# Patient Record
Sex: FEMALE | Race: WHITE | NOT HISPANIC OR LATINO | Employment: OTHER | URBAN - METROPOLITAN AREA
[De-identification: names, ages, dates, MRNs, and addresses within clinical notes are randomized per-mention and may not be internally consistent; named-entity substitution may affect disease eponyms.]

---

## 2017-05-03 ENCOUNTER — HOSPITAL ENCOUNTER (OUTPATIENT)
Dept: MAMMOGRAPHY | Facility: CLINIC | Age: 47
Discharge: HOME | End: 2017-05-03
Attending: FAMILY MEDICINE

## 2017-05-03 DIAGNOSIS — Z12.31 VISIT FOR SCREENING MAMMOGRAM: ICD-10-CM

## 2017-05-26 ENCOUNTER — OFFICE VISIT - HEALTHEAST (OUTPATIENT)
Dept: FAMILY MEDICINE | Facility: CLINIC | Age: 47
End: 2017-05-26

## 2017-05-26 DIAGNOSIS — G89.29 CHRONIC PELVIC PAIN IN FEMALE: ICD-10-CM

## 2017-05-26 DIAGNOSIS — Z12.4 SCREENING FOR CERVICAL CANCER: ICD-10-CM

## 2017-05-26 DIAGNOSIS — Z00.00 ROUTINE GENERAL MEDICAL EXAMINATION AT A HEALTH CARE FACILITY: ICD-10-CM

## 2017-05-26 DIAGNOSIS — R10.2 CHRONIC PELVIC PAIN IN FEMALE: ICD-10-CM

## 2017-05-26 ASSESSMENT — MIFFLIN-ST. JEOR: SCORE: 1058.15

## 2017-06-01 LAB
HPV INTERPRETATION - HISTORICAL: NORMAL
HPV INTERPRETER - HISTORICAL: NORMAL

## 2017-06-06 LAB
BKR LAB AP ABNORMAL BLEEDING: NO
BKR LAB AP BIRTH CONTROL/HORMONES: ABNORMAL
BKR LAB AP CERVICAL APPEARANCE: NORMAL
BKR LAB AP GYN ADEQUACY: ABNORMAL
BKR LAB AP GYN INTERPRETATION: ABNORMAL
BKR LAB AP HPV REFLEX: ABNORMAL
BKR LAB AP LMP: ABNORMAL
BKR LAB AP PATIENT STATUS: ABNORMAL
BKR LAB AP PREVIOUS ABNORMAL: ABNORMAL
BKR LAB AP PREVIOUS NORMAL: ABNORMAL
HIGH RISK?: YES
PATH REPORT.COMMENTS IMP SPEC: ABNORMAL
RESULT FLAG (HE HISTORICAL CONVERSION): ABNORMAL

## 2017-06-08 ENCOUNTER — COMMUNICATION - HEALTHEAST (OUTPATIENT)
Dept: FAMILY MEDICINE | Facility: CLINIC | Age: 47
End: 2017-06-08

## 2017-06-08 DIAGNOSIS — R87.619 ABNORMAL PAP SMEAR OF CERVIX: ICD-10-CM

## 2017-08-10 ENCOUNTER — AMBULATORY - HEALTHEAST (OUTPATIENT)
Dept: OBGYN | Facility: CLINIC | Age: 47
End: 2017-08-10

## 2017-08-10 DIAGNOSIS — R87.612 PAP SMEAR ABNORMALITY OF CERVIX WITH LGSIL: ICD-10-CM

## 2017-08-10 DIAGNOSIS — Z01.812 PRE-PROCEDURAL LABORATORY EXAMINATION: ICD-10-CM

## 2017-08-10 ASSESSMENT — MIFFLIN-ST. JEOR: SCORE: 1057.67

## 2017-10-22 ENCOUNTER — HEALTH MAINTENANCE LETTER (OUTPATIENT)
Age: 47
End: 2017-10-22

## 2019-02-26 ENCOUNTER — RECORDS - HEALTHEAST (OUTPATIENT)
Dept: ADMINISTRATIVE | Facility: OTHER | Age: 49
End: 2019-02-26

## 2019-04-01 ENCOUNTER — AMBULATORY - HEALTHEAST (OUTPATIENT)
Dept: UROLOGY | Facility: CLINIC | Age: 49
End: 2019-04-01

## 2019-04-01 DIAGNOSIS — N20.0 CALCULUS OF KIDNEY: ICD-10-CM

## 2019-04-15 ENCOUNTER — HOSPITAL ENCOUNTER (OUTPATIENT)
Dept: MAMMOGRAPHY | Facility: CLINIC | Age: 49
Discharge: HOME | End: 2019-04-15
Attending: FAMILY MEDICINE

## 2019-04-15 ENCOUNTER — HOSPITAL ENCOUNTER (OUTPATIENT)
Dept: CT IMAGING | Facility: CLINIC | Age: 49
Discharge: HOME | End: 2019-04-15
Attending: PHYSICIAN ASSISTANT

## 2019-04-15 ENCOUNTER — OFFICE VISIT - HEALTHEAST (OUTPATIENT)
Dept: UROLOGY | Facility: CLINIC | Age: 49
End: 2019-04-15

## 2019-04-15 DIAGNOSIS — Z12.31 VISIT FOR SCREENING MAMMOGRAM: ICD-10-CM

## 2019-04-15 DIAGNOSIS — Z87.442 HISTORY OF KIDNEY STONES: ICD-10-CM

## 2019-04-15 DIAGNOSIS — N20.0 CALCULUS OF KIDNEY: ICD-10-CM

## 2019-04-15 LAB
ALBUMIN UR-MCNC: NEGATIVE MG/DL
APPEARANCE UR: ABNORMAL
BILIRUB UR QL STRIP: NEGATIVE
COLOR UR AUTO: YELLOW
GLUCOSE UR STRIP-MCNC: NEGATIVE MG/DL
HGB UR QL STRIP: ABNORMAL
KETONES UR STRIP-MCNC: NEGATIVE MG/DL
LEUKOCYTE ESTERASE UR QL STRIP: NEGATIVE
NITRATE UR QL: NEGATIVE
PH UR STRIP: 6 [PH] (ref 5–8)
SP GR UR STRIP: <=1.005 (ref 1–1.03)
UROBILINOGEN UR STRIP-ACNC: ABNORMAL

## 2019-04-15 RX ORDER — IBUPROFEN 200 MG
1 CAPSULE ORAL DAILY
Status: SHIPPED | COMMUNITY
Start: 2019-04-15

## 2019-04-29 ENCOUNTER — OFFICE VISIT - HEALTHEAST (OUTPATIENT)
Dept: FAMILY MEDICINE | Facility: CLINIC | Age: 49
End: 2019-04-29

## 2019-04-29 DIAGNOSIS — Z00.00 ROUTINE GENERAL MEDICAL EXAMINATION AT A HEALTH CARE FACILITY: ICD-10-CM

## 2019-04-29 DIAGNOSIS — Z12.4 SCREENING FOR CERVICAL CANCER: ICD-10-CM

## 2019-04-29 DIAGNOSIS — Z12.11 SPECIAL SCREENING FOR MALIGNANT NEOPLASMS, COLON: ICD-10-CM

## 2019-04-29 ASSESSMENT — MIFFLIN-ST. JEOR: SCORE: 1075.81

## 2019-04-30 ENCOUNTER — COMMUNICATION - HEALTHEAST (OUTPATIENT)
Dept: FAMILY MEDICINE | Facility: CLINIC | Age: 49
End: 2019-04-30

## 2019-04-30 LAB
HPV SOURCE: NORMAL
HUMAN PAPILLOMA VIRUS 16 DNA: NEGATIVE
HUMAN PAPILLOMA VIRUS 18 DNA: NEGATIVE
HUMAN PAPILLOMA VIRUS FINAL DIAGNOSIS: NORMAL
HUMAN PAPILLOMA VIRUS OTHER HR: NEGATIVE
SPECIMEN DESCRIPTION: NORMAL

## 2019-05-06 LAB
BKR LAB AP ABNORMAL BLEEDING: NO
BKR LAB AP BIRTH CONTROL/HORMONES: ABNORMAL
BKR LAB AP CERVICAL APPEARANCE: NORMAL
BKR LAB AP GYN ADEQUACY: ABNORMAL
BKR LAB AP GYN INTERPRETATION: ABNORMAL
BKR LAB AP HPV REFLEX: ABNORMAL
BKR LAB AP LMP: ABNORMAL
BKR LAB AP PATIENT STATUS: ABNORMAL
BKR LAB AP PREVIOUS ABNORMAL: ABNORMAL
BKR LAB AP PREVIOUS NORMAL: ABNORMAL
HIGH RISK?: NO
PATH REPORT.COMMENTS IMP SPEC: ABNORMAL
RESULT FLAG (HE HISTORICAL CONVERSION): ABNORMAL

## 2019-05-07 ENCOUNTER — COMMUNICATION - HEALTHEAST (OUTPATIENT)
Dept: FAMILY MEDICINE | Facility: CLINIC | Age: 49
End: 2019-05-07

## 2019-06-02 ENCOUNTER — RECORDS - HEALTHEAST (OUTPATIENT)
Dept: ADMINISTRATIVE | Facility: OTHER | Age: 49
End: 2019-06-02

## 2019-06-02 LAB — COLOGUARD-ABSTRACT: NEGATIVE

## 2019-06-17 ENCOUNTER — COMMUNICATION - HEALTHEAST (OUTPATIENT)
Dept: FAMILY MEDICINE | Facility: CLINIC | Age: 49
End: 2019-06-17

## 2019-06-21 ENCOUNTER — RECORDS - HEALTHEAST (OUTPATIENT)
Dept: HEALTH INFORMATION MANAGEMENT | Facility: CLINIC | Age: 49
End: 2019-06-21

## 2019-06-21 ENCOUNTER — COMMUNICATION - HEALTHEAST (OUTPATIENT)
Dept: FAMILY MEDICINE | Facility: CLINIC | Age: 49
End: 2019-06-21

## 2019-08-30 ENCOUNTER — AMBULATORY - HEALTHEAST (OUTPATIENT)
Dept: UROLOGY | Facility: CLINIC | Age: 49
End: 2019-08-30

## 2019-08-30 ENCOUNTER — COMMUNICATION - HEALTHEAST (OUTPATIENT)
Dept: UROLOGY | Facility: CLINIC | Age: 49
End: 2019-08-30

## 2019-08-30 ENCOUNTER — COMMUNICATION - HEALTHEAST (OUTPATIENT)
Dept: SCHEDULING | Facility: CLINIC | Age: 49
End: 2019-08-30

## 2019-08-30 DIAGNOSIS — N23 RENAL COLIC: ICD-10-CM

## 2019-08-30 DIAGNOSIS — N20.1 CALCULUS OF URETER: ICD-10-CM

## 2019-08-30 RX ORDER — ONDANSETRON 4 MG/1
4 TABLET, ORALLY DISINTEGRATING ORAL EVERY 8 HOURS PRN
Qty: 20 TABLET | Refills: 0 | Status: SHIPPED | OUTPATIENT
Start: 2019-08-30 | End: 2021-07-19

## 2019-08-30 RX ORDER — OXYCODONE HYDROCHLORIDE 5 MG/1
5-10 TABLET ORAL EVERY 4 HOURS PRN
Qty: 12 TABLET | Refills: 0 | Status: SHIPPED | OUTPATIENT
Start: 2019-08-30 | End: 2021-07-19

## 2020-02-24 ENCOUNTER — HEALTH MAINTENANCE LETTER (OUTPATIENT)
Age: 50
End: 2020-02-24

## 2020-03-25 ENCOUNTER — VIRTUAL VISIT (OUTPATIENT)
Dept: FAMILY MEDICINE | Facility: OTHER | Age: 50
End: 2020-03-25

## 2020-03-25 NOTE — PROGRESS NOTES
"Date: 2020 11:59:21  Clinician: GIO Sheldon  Clinician NPI: [id number]  Patient: Iman Alves  Patient : 1970  Patient Address: [address] [place] [address]  Patient Phone: [phone]  Visit Protocol: URI  Patient Summary:  Iman is a 49 year old ( : 1970 ) female who initiated a Visit for COVID-19 (Coronavirus) evaluation and screening. When asked the question \"Please sign me up to receive news, health information and promotions from [employer].\", Iman responded \"No\".    Iman states her symptoms started gradually 3-6 days ago. After her symptoms started, they improved and then got worse again.   Her symptoms consist of malaise, chills, a headache, and myalgia. Iman also feels feverish.   Symptom details     Temperature: Her current temperature is 97.2 degrees Fahrenheit.     Headache: She states the headache is mild (1-3 on a 10 point pain scale).      Iman denies having ear pain, rhinitis, teeth pain, facial pain or pressure, wheezing, sore throat, cough, and nasal congestion. She also denies having a sinus infection within the past year, taking antibiotic medication for the symptoms, and having recent facial or sinus surgery in the past 60 days. She is not experiencing dyspnea.   Precipitating events  She has not recently been exposed to someone with influenza. Iman has not been in close contact with any high risk individuals.   Pertinent COVID-19 (Coronavirus) information  Iman has not traveled internationally or to the areas where COVID-19 (Coronavirus) is widespread, including cruise ship travel in the last 14 days before the start of her symptoms.   Iman has not had a close contact with a laboratory-confirmed COVID-19 patient within 14 days of symptom onset. She has had a close contact with a suspected COVID-19 patient within 14 days of symptom onset. 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 The symptomatic person handled all of our groceries, and we chatted in close proximity for approximately 10+ minutes.   Iman is not a healthcare worker or a  and does not work in a healthcare facility. She is not a family member of a healthcare worker and does not live with someone who is a healthcare worker.   Pertinent medical history  Iman typically gets a yeast infection when she takes antibiotics. She has used fluconazole (Diflucan) to treat previous yeast infections. 1 dose of fluconazole (Diflucan) has typically been sufficient for symptoms to resolve in the past.   Iman does not need a return to work/school note.   Weight: 100 lbs   Iman does not smoke or use smokeless tobacco.   Additional information as reported by the patient (free text): Symptoms began with scratchy/sore throat &amp; tiredness. Then headache, achiness, extreme fatigue. Then cough for 1-2 days, with an unusual feeling of needing to breathe more deeply than usual with each breath. Most symptoms have subsided, except for worsening fatigue, weakness, achiness, &amp; chills. Last night, my temperature was 96.5 F (very low);  today, it's 97.2F. The unusual, persistently low body temperature is the reason for my concern.   Weight: 100 lbs    MEDICATIONS: No current medications, ALLERGIES: NKDA  Clinician Response:  Dear Iman,   Based on the information you have provided, you do have symptoms that are consistent with Coronavirus (COVID-19).  The coronavirus causes mild to severe respiratory illness with the most common symptoms including fever, cough and difficulty breathing. Unfortunately, many viruses cause similar symptoms and it can be difficult to distinguish between viruses, especially in mild cases, so we are presuming that anyone with cough or  fever has coronavirus at this time.  Coronavirus/COVID-19 has reached the point of community spread in [place], meaning that we are finding the virus in people with no known exposure risk for stacie the virus. Given the increasing commonness of coronavirus in the community we are no longer testing patients who are not critically ill.  If you are a health care worker, you should refer to your employee health office for instructions about testing and returning to work.  For everyone else who has cough or fever, you should assume you are infected with coronavirus. Since you will not be tested but have symptoms that may be consistent with coronavirus, the CDC recommends you stay in self-isolation until these three things have happened:    You have had no fever for at least 72 hours (that is three full days of no fever without the use of medicine that reduces fevers)    AND   Other symptoms have improved (for example, when your cough or shortness of breath have improved)   AND   At least 7 days have passed since your symptoms first appeared.   How to Isolate:   Isolate yourself at home.  Do Not allow any visitors  Do Not go to work or school  Do Not go to [employer],  centers, shopping, or other public places.  Do Not shake hands.  Avoid close contact with others (hugging, kissing).   Protect Others:   Cover Your Mouth and Nose with a mask, disposable tissue or wash cloth to avoid spreading germs to others.  Wash your hands and face frequently with soap and water.   We know it can be scary to hear that you might have COVID-19. Our team can help track your symptoms and make sure you are doing ok over the next two weeks using a program called [employer] to keep in touch. When you receive an email from [employer], please consider enrolling in our monitoring program. There is no cost to you for monitoring. Here is a URL where you can learn more: [email or web address]  Managing Symptoms:   At this  time, we primarily recommend Tylenol (Acetaminophen) for fever or pain. If you have liver or kidney problems, contact your primary care provider for instructions on use of tylenol. Adults can take 650 mg (two 325 mg pills) by mouth every 4-6 hours as needed OR 1,000 mg (two 500 mg pills) every 8 hours as needed. MAXIMUM DAILY DOSE: 3,000mg. For children, refer to dosing on bottle based on age or weight.   If you develop significant shortness of breath that prevents you from doing normal activities, please call 911 or proceed to the nearest emergency room and alert them immediately that you have been in self-isolation for possible coronavirus.  For more information about COVID19 and options for caring for yourself at home, please visit the CDC website at https://www.cdc.gov/coronavirus/2019-ncov/about/steps-when-sick.htmlFor more options for care at [facility], please visit our website at [email or web address]    Diagnosis: Myalgia  Diagnosis ICD: M79.1  Additional Clinician Notes: Iman, your temperature does not concern me too much. Normal body temperature can range between 97-99 degrees. The 96.5 degree temperature is rather low, but as long as it is not persistently 96 and comes back up into the 97's I don't think there is significant cause for concern. I would follow the self-isolation guidelines above given your symptoms and exposure history.  Prescription: acetaminophen (Tylenol Extra Strength) 500 mg oral tablet 60 tablet, 0 days supply. Take 2 tablets by mouth every 8 hours as needed for pain or fever. Refills: 0, Refill as needed: no, Allow substitutions: yes

## 2020-11-21 ENCOUNTER — NURSE TRIAGE (OUTPATIENT)
Dept: NURSING | Facility: CLINIC | Age: 50
End: 2020-11-21

## 2020-11-21 ENCOUNTER — VIRTUAL VISIT (OUTPATIENT)
Dept: FAMILY MEDICINE | Facility: OTHER | Age: 50
End: 2020-11-21

## 2020-11-21 NOTE — TELEPHONE ENCOUNTER
One of eyes is somewhat red and bloodshot, feels like something is in it, extra thick tears/thin tears, clear discharge, slightly swollen    Does not feel like pink eye  No itching   No changes in vision    Feels mildly uncomfortable.     Patient is worried that this is some sort of off the wall COVID symptom and does not want to be publicly irresponsible.     Advised [email or web address] to discuss need for COVID19 testing. Patient is agreeable.     COVID 19 Nurse Triage Plan/Patient Instructions    Please be aware that novel coronavirus (COVID-19) may be circulating in the community. If you develop symptoms such as fever, cough, or SOB or if you have concerns about the presence of another infection including coronavirus (COVID-19), please contact your health care provider or visit [email or web address].     Disposition/Instructions    Virtual Visit with provider recommended. Reference Visit Selection Guide.    Thank you for taking steps to prevent the spread of this virus.  o Limit your contact with others.  o Wear a simple mask to cover your cough.  o Wash your hands well and often.    Resources    [facility]: About COVID-19: [email or web address]    CDC: What to Do If You're Sick: www.cdc.gov/coronavirus/2019-ncov/about/steps-when-sick.html    CDC: Ending Home Isolation: www.cdc.gov/coronavirus/2019-ncov/hcp/disposition-in-home-patients.html     CDC: Caring for Someone: www.cdc.gov/coronavirus/2019-ncov/if-you-are-sick/care-for-someone.html     [facility]: Interim Guidance for Hospital Discharge to Home: www.health.[facility].[place]/diseases/coronavirus/hcp/hospdischarge.pdf    [facility] clinical trials (COVID-19 research studies): clinicalaffairs.[facility].edu/umn-clinical-trials     Below are the COVID-19 hotlines at the [place] Department of Health ([facility]). Interpreters are available.   o For health questions: Call [phone] or 1-558.147.1024 (7 a.m. to 7 p.m.)  o For questions about schools and childcare:  Call [phone] or [phone] (7 a.m. to 7 p.m.)     Additional Information    Negative: Eye exposure to chemical or fumes    Negative: Redness of white of eye (sclera), but no pus or only a small amount of brief pus    Negative: SEVERE eye pain (e.g., excruciating)    Negative: [1] Eyelids are very swollen (shut or almost) AND [2] fever    Negative: [1] Eyelid (outer) is very red (or tender to touch) AND [2] fever    Negative: Patient sounds very sick or weak to the triager    Negative: MODERATE eye pain (e.g., interferes with normal activities)    Negative: Fever > 104 F (40 C)    Negative: Cloudy spot or sore seen on the cornea (clear part of the eye)    Negative: Blurred vision    Negative: Eye is very swollen (shut or almost)    Negative: [1] MILD eye pain or discomfort AND [2] wears contacts    Negative: Eyelid is red and painful (or tender to touch)    Negative: Discharge from penis    Negative: New or abnormal vaginal discharge    Negative: Fever present > 3 days (72 hours)    Negative: [1] Lots of yellow or green NASAL discharge AND [2] present now AND [3] fever    Negative: Weak immune system (e.g., HIV positive, cancer chemo, splenectomy, organ transplant, chronic steroids)    Negative: [1] Eye with yellow/green discharge or eyelashes stick together AND [2] NO PCP standing order to call in antibiotic eye drops    Negative: [1] Using antibiotic eyedrops AND [2] eyes have become very itchy (especially after eyedrops are put in)    Negative: [1] Taking oral antibiotic > 48 hours (2 days) AND [2] pus in eye persists    Negative: [1] Using antibiotic eyedrops > 72 hours (3 days) AND [2] pus in eye persists    Negative: Bleeding on white of the eye    [1] Very small amount of discharge AND [2] only in corner of eye    Protocols used: EYE - PUS OR [id number]    Clementina Sim RN on 11/21/2020 at 1:21 PM

## 2020-11-21 NOTE — PROGRESS NOTES
"Date: 2020 14:18:07  Clinician: Cherise Beltran  Clinician NPI: [id number]  Patient: Iman Alves  Patient : 1970  Patient Address: [address] [place] [address]  Patient Phone: [phone]  Visit Protocol: URI  Patient Summary:  Iman is a 50 year old ( : 1970 ) female who initiated a [facility] Visit for COVID-19 (Coronavirus) evaluation and screening. When asked the question \"Please sign me up to receive news, health information and promotions from [facility].\", Iman responded \"No\".    Iman states her symptoms started gradually 3-4 days ago. After her symptoms started, they improved and then got worse again.   Her symptoms consist of rhinitis and malaise.   Symptom details   Nasal secretions: The color of her mucus is clear.   Iman denies having vomiting, facial pain or pressure, myalgias, chills, sore throat, teeth pain, ageusia, diarrhea, ear pain, headache, wheezing, fever, cough, nasal congestion, nausea, and anosmia. She also denies taking antibiotic medication in the past month and having recent facial or sinus surgery in the past 60 days. She is not experiencing dyspnea.    Pertinent COVID-19 (Coronavirus) information  Iman does not work or volunteer as healthcare worker or a . In the past 14 days, Iman has not worked or volunteered at a healthcare facility or group living setting.   In the past 14 days, she also has not lived in a congregate living setting.   Iman has not had a close contact with a laboratory-confirmed COVID-19 patient within 14 days of symptom onset.    Since 2019, Iman has not been tested for COVID-19 and has not had upper respiratory infection or influenza-like illness.   Pertinent medical history  Iman typically gets a yeast infection when she takes antibiotics. She is not sure if she has used fluconazole (Diflucan) to treat previous yeast infections.   Iman does not need a return to work/school note.   Weight: 102 lbs   Iman " "does not smoke or use smokeless tobacco.   Additional information as reported by the patient (free text): The primary reason for my consultation is due to irritation, mild redness, excessive tears, and (especially) accumulation of thick \"tears\" or mucus inside my lower eyelid on my right eye. No itching, no crusty or yellow discharge, and no itchiness as would typically occur with common \"pinkeye.\" I am an infectious disease  and am aware that viral conjunctivitis has been identified as a somewhat rare symptom of COVID-19 in certain otherwise asymptomatic or only mildly ill people.   Weight: 102 lbs    MEDICATIONS: No current medications, ALLERGIES: NKDA  Clinician Response:  Dear Iman,   Your symptoms show that you may have coronavirus (COVID-19). This illness can cause fever, cough and trouble breathing. Many people get a mild case and get better on their own. Some people can get very sick.  What should I do?  We would like to test you for this virus.   1. Please call [phone] to schedule your visit. Explain that you were referred by [facility] to have a COVID-19 test. Be ready to share your [facility] visit [facility].  * If you need to schedule in [facility] [place] please call [phone] or for [employer] employees please call [phone].  * If you need to schedule in the [place] area please call [phone]. [employer] employees call [phone].  The following will serve as your written order for this COVID Test, ordered by me, for the indication of suspected COVID [Z20.828]: The test will be ordered in [employer], our electronic health record, after you are scheduled. It will show as ordered and authorized by Romulo Guillory MD.  Order: COVID-19 (Coronavirus) PCR for SYMPTOMATIC testing from [facility].   2. When it's time for your COVID test:  Stay at least 6 feet away from others. (If someone will drive you to your test, stay in the backseat, as far away from the  as you can.)   Cover your mouth and nose with " "a mask, tissue or washcloth.  Go straight to the testing site. Don't make any stops on the way there or back.      3.Starting now: Stay home and away from others (self-isolate) until:   You've had no fever---and no medicine that reduces fever---for one full day (24 hours). And...   Your other symptoms have gotten better. For example, your cough or breathing has improved. And...   At least 10 days have passed since your symptoms started.       During this time, don't leave the house except for testing or medical care.   Stay in your own room, even for meals. Use your own bathroom if you can.   Stay away from others in your home. No hugging, kissing or shaking hands. No visitors.  Don't go to work, school or anywhere else.    Clean \"high touch\" surfaces often (doorknobs, counters, handles, etc.). Use a household cleaning spray or wipes. You'll find a full list of  on the EPA website: www.epa.gov/pesticide-registration/list-n-disinfectants-use-against-sars-cov-2.   Cover your mouth and nose with a mask, tissue or washcloth to avoid spreading germs.  Wash your hands and face often. Use soap and water.  Caregivers in these groups are at risk for severe illness due to COVID-19:  o People 65 years and older  o People who live in a nursing home or long-term care facility  o People with chronic disease (lung, heart, cancer, diabetes, kidney, liver, immunologic)  o People who have a weakened immune system, including those who:   Are in cancer treatment  Take medicine that weakens the immune system, such as corticosteroids  Had a bone marrow or organ transplant  Have an immune deficiency  Have poorly controlled HIV or AIDS  Are obese (body mass index of 40 or higher)  Smoke regularly   o Caregivers should wear gloves while washing dishes, handling laundry and cleaning bedrooms and bathrooms.  o Use caution when washing and drying laundry: Don't shake dirty laundry, and use the warmest water setting that you can.  o For " more tips, go to www.cdc.gov/coronavirus/2019-ncov/downloads/10Things.pdf.    4.Sign up for [employer]. We know it's scary to hear that you might have COVID-19. We want to track your symptoms to make sure you're okay over the next 2 weeks. Please look for an email from [employer]---this is a free, online program that we'll use to keep in touch. To sign up, follow the link in the email. Learn more at http://www.[employer])/251070.pdf  How can I take care of myself?   Get lots of rest. Drink extra fluids (unless a doctor has told you not to).   Take Tylenol (acetaminophen) for fever or pain. If you have liver or kidney problems, ask your family doctor if it's okay to take Tylenol.   Adults can take either:    650 mg (two 325 mg pills) every 4 to 6 hours, or...   1,000 mg (two 500 mg pills) every 8 hours as needed.    Note: Don't take more than 3,000 mg in one day. Acetaminophen is found in many medicines (both prescribed and over-the-counter medicines). Read all labels to be sure you don't take too much.   For children, check the Tylenol bottle for the right dose. The dose is based on the child's age or weight.    If you have other health problems (like cancer, heart failure, an organ transplant or severe kidney disease): Call your specialty clinic if you don't feel better in the next 2 days.       Know when to call 911. Emergency warning signs include:    Trouble breathing or shortness of breath Pain or pressure in the chest that doesn't go away Feeling confused like you haven't felt before, or not being able to wake up Bluish-colored lips or face.  Where can I get more information?    [facility] -- About COVID-19: [email or web address]   CDC -- What to Do If You're Sick: www.cdc.gov/coronavirus/2019-ncov/about/steps-when-sick.html   CDC -- Ending Home Isolation: www.cdc.gov/coronavirus/2019-ncov/hcp/disposition-in-home-patients.html   CDC -- Caring for Someone:  www.cdc.gov/coronavirus/2019-ncov/if-you-are-sick/care-for-someone.html   [facility] -- Interim Guidance for Hospital Discharge to Home: www.health.[facility].[place]/diseases/coronavirus/hcp/hospdischarge.pdf   [facility] clinical trials (COVID-19 research studies): clinicalaffairs.[facility]-clinical-trials    Below are the COVID-19 hotlines at the [place] Department of Health ([facility]). Interpreters are available.    For health questions: Call 743-605-2587 or 1-697.118.7443 (7 a.m. to 7 p.m.) For questions about schools and childcare: Call 590-958-2258 or 1-677.143.6724 (7 a.m. to 7 p.m.)    Diagnosis: Contact with and (suspected) exposure to other viral communicable diseases  Diagnosis ICD: Z20.828

## 2020-12-13 ENCOUNTER — HEALTH MAINTENANCE LETTER (OUTPATIENT)
Age: 50
End: 2020-12-13

## 2021-01-27 ENCOUNTER — COMMUNICATION - HEALTHEAST (OUTPATIENT)
Dept: SCHEDULING | Facility: CLINIC | Age: 51
End: 2021-01-27

## 2021-01-28 ENCOUNTER — OFFICE VISIT - HEALTHEAST (OUTPATIENT)
Dept: FAMILY MEDICINE | Facility: CLINIC | Age: 51
End: 2021-01-28

## 2021-01-28 DIAGNOSIS — G56.01 CARPAL TUNNEL SYNDROME OF RIGHT WRIST: ICD-10-CM

## 2021-01-28 DIAGNOSIS — S52.501K CLOSED FRACTURE OF DISTAL END OF RIGHT RADIUS WITH NONUNION, UNSPECIFIED FRACTURE MORPHOLOGY, SUBSEQUENT ENCOUNTER: ICD-10-CM

## 2021-01-28 DIAGNOSIS — Z01.818 PREOPERATIVE EXAMINATION: ICD-10-CM

## 2021-01-28 LAB — HCG UR QL: NEGATIVE

## 2021-01-28 ASSESSMENT — MIFFLIN-ST. JEOR: SCORE: 1087.94

## 2021-02-11 ENCOUNTER — TRANSFERRED RECORDS (OUTPATIENT)
Dept: HEALTH INFORMATION MANAGEMENT | Facility: CLINIC | Age: 51
End: 2021-02-11

## 2021-02-15 ENCOUNTER — THERAPY VISIT (OUTPATIENT)
Dept: OCCUPATIONAL THERAPY | Facility: CLINIC | Age: 51
End: 2021-02-15
Payer: COMMERCIAL

## 2021-02-15 DIAGNOSIS — S52.531D CLOSED COLLES' FRACTURE OF RIGHT RADIUS WITH ROUTINE HEALING: ICD-10-CM

## 2021-02-15 DIAGNOSIS — M25.631 WRIST STIFFNESS, RIGHT: ICD-10-CM

## 2021-02-15 DIAGNOSIS — M25.531 RIGHT WRIST PAIN: Primary | ICD-10-CM

## 2021-02-15 DIAGNOSIS — Z48.89 OTHER SPECIFIED AFTERCARE FOLLOWING SURGERY: ICD-10-CM

## 2021-02-15 PROCEDURE — 97165 OT EVAL LOW COMPLEX 30 MIN: CPT | Mod: GO | Performed by: OCCUPATIONAL THERAPIST

## 2021-02-15 PROCEDURE — 97110 THERAPEUTIC EXERCISES: CPT | Mod: GO | Performed by: OCCUPATIONAL THERAPIST

## 2021-02-15 PROCEDURE — 97535 SELF CARE MNGMENT TRAINING: CPT | Mod: GO | Performed by: OCCUPATIONAL THERAPIST

## 2021-02-15 NOTE — PROGRESS NOTES
Hand Therapy Initial Evaluation    Current Date:  2/15/2021    Diagnosis:  Right Distal radis fracture  DOI:  1/22/21  DOS:  2/2/21  Procedure:  ORIF  Post:  2w 0d  Referring MD: Bear Alejo MD  Next MD visit: 2 weeks     Subjective:  Iman Alves is a 50 year old right hand dominant female.  Subjective:    Patient Health History  Iman Alves being seen for rehabilitation after ORIF/carpal tunnel surgery for broken distal radius and (non-surgical) broken distal ulna.     Problem began: 1/22/2021.   Problem occurred: fall in home   Pain is reported as 4/10 on pain scale.  General health as reported by patient is good.  Pertinent medical history includes: history of fractures, numbness/tingling, pain at night/rest, changes in skin color and weakness.     Medical allergies: none.   Surgeries include:  Orthopedic surgery and other. Other surgery history details: umbilical cyst repair (1972); abdominal muscle repair (2008).    Current medications:  None.    Current occupation is / (self-employed).   Primary job tasks include:  Computer work, driving, prolonged sitting and repetitive tasks.                      Occupational Profile Information:  Problem began: was stepping over the baby/puppy gate and tripped and fell, notes she had a minor concussion after the fall and this was assessed at the ED.    Pior functional level:  no limitations    Barriers include:lives with son, age 13, healing fx left leg  Mobility: No difficulty  Transportation: drives  Leisure activities/hobbies: has a new puppy, walk the puppy, works on the house, was painting and remolding, likes to read  Other: difficulty typing only doing it one handed    Functional Outcome Measure:   Please refer to flowsheet    Objective:    Pain Level Report:  Pain Level (Scale 0-10):   2/15/2021   At Rest 3-4   With Use 6-7     Pain Description:  Date 2/15/2021    right    Location  elbow, forearm, wrist and hand  Shoulder and elbow  cubital tunnel area since the surgery has been noted   Pain Quality Aching, Dull, Numb, Sharp, Tender, Tingling and zinging   Frequency intermittent, constant or daily     Pain is worst daytime   Exacerbated by Use and typing   Relieved by heat, rest and touching and moving into a better position, OTC as needed   Progression Not better     Strength:  [x]   Contraindicated  Edema:  moderate of the  hand, thumb, index finger, long finger, ring finger and small finger  Wound/Scar: tender, clean without drainage and closed with steristrips  Palpation:  []     Normal        []   Tender       [x]  Mild         []   Moderate []   Severe   Location: volar forearm       Sensation: Decreased Median Nerve distribution per pt report and since the surgery, pt reported  ROM:  Wrist 2/15/2021   AROM(PROM) Right   Extension 20   Flexion 15   RD    UD    Supination 65   Pronation 20     ROM:    Thumb 2/15/2021   AROM(PROM) Right   MP 0/30   IP -5/50   RAbd    PAbd 55   Retropulsion    Kapandji Opposition Scale (0-10/10) 5       ROM:   Fingers 2/15/2021 2/15/2021   AROM(PROM) Right Flex lag   Index MP -15 2.5   PIP -10    DIP     FERRARA     Long MP -20 2.5   PIP -15    DIP     FERRARA     Ring MP -15 2.5   PIP -15    DIP     FERRARA     Small MP -10 1.75   PIP -10    DIP     FERRARA         Assessment:  Patient presents with symptoms consistent with diagnosis of closed distal radius fracture, with surgical  intervention.     Patient's limitations or Problem List includes:  Pain, Decreased ROM/motion, Weakness, Decreased  and pinch strength of the right elbow, wrist, hand, thumb, index finger, long finger, ring finger and small finger which interferes with the patient's ability to perform Self Care Tasks (dressing, eating, bathing, hygiene/toileting), Work Tasks, Sleep Patterns, Recreational Activities, Household Chores and Driving  as compared to previous level of function.    Rehab Potential:  Excellent - Return to full activity, no  limitations  Patient will benefit from skilled Occupational Therapy to increase wrist and forearm ROM, flexibility,  strength and pinch strength and decrease pain to return to previous activity level and resume normal daily tasks and to reach their rehab potential.    Barriers to Learning:  No barrier    Communication Issues:  Patient appears to be able to clearly communicate and understand verbal and written communication and follow directions correctly.  Chart Review: Chart Review, Brief history including review of medical and/or therapy records relating to the presenting problem and Simple history review with patient    Identified Performance Deficits: bathing/showering, toileting, dressing, feeding, hygiene and grooming, care of others, care of pets, driving and community mobility, home establishment and management, meal preparation and cleanup, sleep, work, play and leisure activities    Assessment of Occupational Performance:  5 or more Performance Deficits    Treatment Explanation:  The following has been discussed with the patient:    RX ordered/plan of care  Anticipated outcomes  Possible risks and side effects    Plan:  Frequency:  2 X week, once daily  Duration:  for 2 weeks tapering to 1 X a week over 6 weeks      Treatment Plan:        Modalities:  Paraffin  Therapeutic Exercise:  AROM, AAROM, PROM, Tendon Gliding, Blocking, Reverse Blocking and Extensor Tracking  Neuromuscular re-education:  Nerve Gliding, Coordination/Dexterity, Sensory re-education, Desensitization and Kinesiotaping  Manual Techniques:  Scar mobilization, Myofascial release and Manual edema mobilization  Self Care:  Ergonomic Considerations  Orthotic Fabrication: Static forearm based orthosis    Discharge Plan:    Achieve all LTG.  Independent in home treatment program.  Reach maximal therapeutic benefit.    Home Program:   Edema management per guidelines 10 minutes on the hour every waking hour  AROM of unaffected joints 3 times  "per day  2/15/2021  Exercise Name: Thumb Active Range of Motion IP Joint Blocking, Sets: 1 - Reps: 10 - Sessions: 3, Notes: Hold and move the tip, in the splint and out of the splint  Exercise Name: Thumb Active Range of Motion MP Joint Blocking, Sets: 1-2 sets - Reps: 10 reps - Sessions: 3, Notes: hold the base of your thumb and move at the middle  joint of your thumb, not bending the tip joint    Exercise Name: Thumb Active Range of Motion Composite Flexion, Sets: 1  - Reps:  5 - 10reps - Sessions: 3x/day, Notes: Remove the splint, and gently move your thumb in a direction down toward the table, and then gently lift it up. Move only in a pain free range, keeping the thumb close to your palm  Exercise Name: Thumb Active Range of Motion Palmar Abduction, Sets: 1 set  - Reps: 5 reps - Sessions: 3x/day, Notes: Roll your thumb out, like making a  big C  Exercise Name: Thumb Active Range of Motion Opposition, Sets: 1-2 - Reps: 5 reps - Sessions: 3-5, Notes:  \"O\" position  to each finger as tolerated, do not push into pain.   Gentle motion within your pain tolerance..  Roll your thumb WAY out in a wide arc from first position to each finger, between each finger tips, lift thumb away with attention to lifting from the middle thumb joint.    Exercise Name: Thumb Active Range of Motion Circumduction, Sets: 1-2   - Reps: 5 - 10x each direction - Sessions: 3, Notes: Keeping the thumb in a Capital C. (Twiddle your thumbs) Make slow circles starting from the bottom or base thumb joint and make small circles, keeping the  metacarpal and proximal phalanx in line. Do not allow the thumb to have a \"broken line\" or deviate from the straight line.   Exercise Name: Finger Active Range of Motion DIP Joint Blocking, Sets: 1-2 - Reps: 5-10 repetitions each set - Sessions: 3-5 , Notes: For your end joint of the your finger   Exercise Name: Finger Active Range of Motion PIP Joint Blocking, Sets: 1-2 - Reps: 5-10 each - Sessions: 3-5, " Notes: MIDDLE JOINT MOTION involved fingers  You can do this over the edge of a table, or you can.   Hold all fingers with your other palm, and then lift and straighten the middle joint of all finger each one individually  Exercise Name: Finger Active Range of Motion FDS Flexor Tendon Gliding, Sets: 1 set  - Reps: 3-5 reps each finger - Sessions: 3, Notes: Moving each finger separately. Respect the pain, and do VERY SLOWLY. FOCUS the bending at the middle joints, and DO NOT PUSH into pain  do for all fingers  Exercise Name:  Intrinsic Finger Active Range of Motion Ab and Adduction, Sets: 1 - Reps: 5-10 - Sessions: 3-5, Notes: open all your fingers wide, then close them, focus on the fingers  Exercise Name: Finger Active Range of Motion Table Top Fist Series - Reps: 10 - Sessions: Every hour, Notes: You can hold a ball or small cylinder in your hand to help your finger to move toward a full fist  Exercise Name: Finger Active Range of Motion Tendon Glides Fist Series, Sets: 1 - Reps: 5-10 reps - Sessions: 3-5x/day, Notes: each position, 3 times, then move to the next position, 3 times, and then reach only to the point of pain for the 3rd position, NO PAIN. and you can do this going backwards: deep/long fist, regular fist, hook fist to fingers tall   wrist stays straight  hold 1-2 sec in each position  Exercise Name: Forearm Functional Range of Motion for Pronation/Supination, Sets: 1-2 - Reps: 15-20 - Sessions: 3, Notes: Go Knee to knee:  With your splint on. You  can then try to  roll your forearm on your leg or on a table with out the support of the other hand. Also, touch right knee palm down, then right shoulder, then Left knee palm up then L shoulder  Exercise Name: Forearm Active Range of Motion Combined Pronation and Supination, Sets: 1 - Reps: 10 - Sessions: 3, Notes: Always work toward palm up, even resting in your lap. Turn your palm up and down. You may rest the arm  on a table or in  your lap and roll the  arm palm up and down (emphasis on palm up).   Exercise Name: Wrist Active Range of Motion DTM/Dart Throwers Motion with Elbow on Table, Sets: 2 - Reps: 20 - Sessions: 1-2, Notes: Dart throwing motion; move the wrist back and forth, slow deliberate motion. Slow and gentle  Hold a paper ball or a very light foam ball: no gripping, just holding the ball. Move in mid range: about 30% of motion    Next Visit:  Progress ROM and follow MD Orders

## 2021-02-18 ENCOUNTER — THERAPY VISIT (OUTPATIENT)
Dept: OCCUPATIONAL THERAPY | Facility: CLINIC | Age: 51
End: 2021-02-18
Payer: COMMERCIAL

## 2021-02-18 DIAGNOSIS — M25.531 RIGHT WRIST PAIN: Primary | ICD-10-CM

## 2021-02-18 DIAGNOSIS — Z48.89 OTHER SPECIFIED AFTERCARE FOLLOWING SURGERY: ICD-10-CM

## 2021-02-18 DIAGNOSIS — S52.531D CLOSED COLLES' FRACTURE OF RIGHT RADIUS WITH ROUTINE HEALING: ICD-10-CM

## 2021-02-18 DIAGNOSIS — M25.631 WRIST STIFFNESS, RIGHT: ICD-10-CM

## 2021-02-18 PROCEDURE — 97110 THERAPEUTIC EXERCISES: CPT | Mod: 95 | Performed by: OCCUPATIONAL THERAPIST

## 2021-02-18 NOTE — PROGRESS NOTES
SOAP note objective information for 2/18/2021.  Please refer to the daily flowsheet for treatment today, total treatment time and time spent performing 1:1 timed codes.        Diagnosis:  Right Distal radis fracture  DOI:  1/22/21  DOS:  2/2/21  Procedure:  ORIF  Post:  2w 0d  Referring MD: Bear Alejo MD  Next MD visit: 2 weeks     Subjective:  Iman Alves is a 50 year old right hand dominant female.  Subjective:    Patient Health History  Iman Alves being seen for rehabilitation after ORIF/carpal tunnel surgery for broken distal radius and (non-surgical) broken distal ulna.     Problem began: 1/22/2021.   Problem occurred: fall in home   Pain is reported as 4/10 on pain scale.  General health as reported by patient is good.  Pertinent medical history includes: history of fractures, numbness/tingling, pain at night/rest, changes in skin color and weakness.     Medical allergies: none.   Surgeries include:  Orthopedic surgery and other. Other surgery history details: umbilical cyst repair (1972); abdominal muscle repair (2008).    Current medications:  None.    Current occupation is / (self-employed).   Primary job tasks include:  Computer work, driving, prolonged sitting and repetitive tasks.                  Occupational Profile Information:  Problem began: was stepping over the baby/puppy gate and tripped and fell, notes she had a minor concussion after the fall and this was assessed at the ED.    Pior functional level:  no limitations    Barriers include:lives with son, age 13, healing fx left leg  Mobility: No difficulty  Transportation: drives  Leisure activities/hobbies: has a new puppy, walk the puppy, works on the house, was painting and remolding, likes to read  Other: difficulty typing only doing it one handed    Functional Outcome Measure:   Please refer to flowsheet    Objective:    Pain Level Report:  Pain Level (Scale 0-10):   2/15/2021    At Rest 3-4    With Use 6-7       Pain Description:  Date 2/15/2021     right     Location  elbow, forearm, wrist and hand  Shoulder and elbow cubital tunnel area since the surgery has been noted    Pain Quality Aching, Dull, Numb, Sharp, Tender, Tingling and zinging    Frequency intermittent, constant or daily      Pain is worst daytime    Exacerbated by Use and typing    Relieved by heat, rest and touching and moving into a better position, OTC as needed    Progression Not better      Strength:  [x]   Contraindicated  Edema:  moderate of the  hand, thumb, index finger, long finger, ring finger and small finger  Wound/Scar: tender, clean without drainage and closed with steristrips  Palpation:  []     Normal        []   Tender       [x]  Mild         []   Moderate []   Severe   Location: volar forearm       Sensation: Decreased Median Nerve distribution per pt report and since the surgery, pt reported  ROM:  Wrist 2/15/2021 2/18/21   AROM(PROM) Right    Extension 20    Flexion 15    RD     UD     Supination 65    Pronation 20      ROM:    Thumb  2/15/2021   AROM(PROM)  Right   MP  0/30   IP  -5/50   RAbd     PAbd  55   Retropulsion     Kapandji Opposition Scale (0-10/10)  5       ROM:   Fingers 2/15/2021 2/15/2021    AROM(PROM) Right Flex lag    Index MP -15 2.5    PIP -10     DIP      FERRARA      Long MP -20 2.5    PIP -15     DIP      FERRARA      Ring MP -15 2.5    PIP -15     DIP      FERRARA      Small MP -10 1.75    PIP -10     DIP      FERRARA          Assessment:  Please refer to the daily flowsheet for treatment today, total treatment time and time spent performing 1:1 timed codes.        Plan:  Frequency:  2 X week, once daily  Duration:  for 2 weeks tapering to 1 X a week over 6 weeks      Treatment Plan:        Modalities:  Paraffin  Therapeutic Exercise:  AROM, AAROM, PROM, Tendon Gliding, Blocking, Reverse Blocking and Extensor Tracking  Neuromuscular re-education:  Nerve Gliding, Coordination/Dexterity, Sensory re-education, Desensitization and  "Kinesiotaping  Manual Techniques:  Scar mobilization, Myofascial release and Manual edema mobilization  Self Care:  Ergonomic Considerations  Orthotic Fabrication: Static forearm based orthosis    Discharge Plan:    Achieve all LTG.  Independent in home treatment program.  Reach maximal therapeutic benefit.    Home Program:   Edema management per guidelines 10 minutes on the hour every waking hour  AROM of unaffected joints 3 times per day  2/15/2021  Exercise Name: Thumb Active Range of Motion IP Joint Blocking, Sets: 1 - Reps: 10 - Sessions: 3, Notes: Hold and move the tip, in the splint and out of the splint  Exercise Name: Thumb Active Range of Motion MP Joint Blocking, Sets: 1-2 sets - Reps: 10 reps - Sessions: 3, Notes: hold the base of your thumb and move at the middle  joint of your thumb, not bending the tip joint    Exercise Name: Thumb Active Range of Motion Composite Flexion, Sets: 1  - Reps:  5 - 10reps - Sessions: 3x/day, Notes: Remove the splint, and gently move your thumb in a direction down toward the table, and then gently lift it up. Move only in a pain free range, keeping the thumb close to your palm  Exercise Name: Thumb Active Range of Motion Palmar Abduction, Sets: 1 set  - Reps: 5 reps - Sessions: 3x/day, Notes: Roll your thumb out, like making a  big C  Exercise Name: Thumb Active Range of Motion Opposition, Sets: 1-2 - Reps: 5 reps - Sessions: 3-5, Notes:  \"O\" position  to each finger as tolerated, do not push into pain.   Gentle motion within your pain tolerance..  Roll your thumb WAY out in a wide arc from first position to each finger, between each finger tips, lift thumb away with attention to lifting from the middle thumb joint.    Exercise Name: Thumb Active Range of Motion Circumduction, Sets: 1-2   - Reps: 5 - 10x each direction - Sessions: 3, Notes: Keeping the thumb in a Capital C. (Twiddle your thumbs) Make slow circles starting from the bottom or base thumb joint and make small " "circles, keeping the  metacarpal and proximal phalanx in line. Do not allow the thumb to have a \"broken line\" or deviate from the straight line.   Exercise Name: Finger Active Range of Motion DIP Joint Blocking, Sets: 1-2 - Reps: 5-10 repetitions each set - Sessions: 3-5 , Notes: For your end joint of the your finger   Exercise Name: Finger Active Range of Motion PIP Joint Blocking, Sets: 1-2 - Reps: 5-10 each - Sessions: 3-5, Notes: MIDDLE JOINT MOTION involved fingers  You can do this over the edge of a table, or you can.   Hold all fingers with your other palm, and then lift and straighten the middle joint of all finger each one individually  Exercise Name: Finger Active Range of Motion FDS Flexor Tendon Gliding, Sets: 1 set  - Reps: 3-5 reps each finger - Sessions: 3, Notes: Moving each finger separately. Respect the pain, and do VERY SLOWLY. FOCUS the bending at the middle joints, and DO NOT PUSH into pain  do for all fingers  Exercise Name:  Intrinsic Finger Active Range of Motion Ab and Adduction, Sets: 1 - Reps: 5-10 - Sessions: 3-5, Notes: open all your fingers wide, then close them, focus on the fingers  Exercise Name: Finger Active Range of Motion Table Top Fist Series - Reps: 10 - Sessions: Every hour, Notes: You can hold a ball or small cylinder in your hand to help your finger to move toward a full fist  Exercise Name: Finger Active Range of Motion Tendon Glides Fist Series, Sets: 1 - Reps: 5-10 reps - Sessions: 3-5x/day, Notes: each position, 3 times, then move to the next position, 3 times, and then reach only to the point of pain for the 3rd position, NO PAIN. and you can do this going backwards: deep/long fist, regular fist, hook fist to fingers tall   wrist stays straight  hold 1-2 sec in each position  Exercise Name: Forearm Functional Range of Motion for Pronation/Supination, Sets: 1-2 - Reps: 15-20 - Sessions: 3, Notes: Go Knee to knee:  With your splint on. You  can then try to  roll your " forearm on your leg or on a table with out the support of the other hand. Also, touch right knee palm down, then right shoulder, then Left knee palm up then L shoulder  Exercise Name: Forearm Active Range of Motion Combined Pronation and Supination, Sets: 1 - Reps: 10 - Sessions: 3, Notes: Always work toward palm up, even resting in your lap. Turn your palm up and down. You may rest the arm  on a table or in  your lap and roll the arm palm up and down (emphasis on palm up).   Exercise Name: Wrist Active Range of Motion DTM/Dart Throwers Motion with Elbow on Table, Sets: 2 - Reps: 20 - Sessions: 1-2, Notes: Dart throwing motion; move the wrist back and forth, slow deliberate motion. Slow and gentle  Hold a paper ball or a very light foam ball: no gripping, just holding the ball. Move in mid range: about 30% of motion    Next Visit:  Progress ROM and follow MD Orders

## 2021-02-22 ENCOUNTER — THERAPY VISIT (OUTPATIENT)
Dept: OCCUPATIONAL THERAPY | Facility: CLINIC | Age: 51
End: 2021-02-22
Payer: COMMERCIAL

## 2021-02-22 DIAGNOSIS — M25.531 RIGHT WRIST PAIN: ICD-10-CM

## 2021-02-22 DIAGNOSIS — M25.511 RIGHT SHOULDER PAIN, UNSPECIFIED CHRONICITY: ICD-10-CM

## 2021-02-22 DIAGNOSIS — Z48.89 OTHER SPECIFIED AFTERCARE FOLLOWING SURGERY: ICD-10-CM

## 2021-02-22 DIAGNOSIS — M25.631 WRIST STIFFNESS, RIGHT: Primary | ICD-10-CM

## 2021-02-22 DIAGNOSIS — S52.531D CLOSED COLLES' FRACTURE OF RIGHT RADIUS WITH ROUTINE HEALING: ICD-10-CM

## 2021-02-22 PROCEDURE — 97110 THERAPEUTIC EXERCISES: CPT | Mod: GO | Performed by: OCCUPATIONAL THERAPIST

## 2021-02-22 PROCEDURE — 97112 NEUROMUSCULAR REEDUCATION: CPT | Mod: GO | Performed by: OCCUPATIONAL THERAPIST

## 2021-02-22 NOTE — PROGRESS NOTES
SOAP note objective information for 2/22/2021.  Please refer to the daily flowsheet for treatment today, total treatment time and time spent performing 1:1 timed codes.        Diagnosis:  Right Distal radis fracture  DOI:  1/22/21  DOS:  2/2/21  Procedure:  ORIF  Post:  2w 0d  Referring MD: Bear Alejo MD  Next MD visit: 2 weeks     Subjective:  Iman Alves is a 50 year old right hand dominant female.  Subjective:    Patient Health History  Iman Alves being seen for rehabilitation after ORIF/carpal tunnel surgery for broken distal radius and (non-surgical) broken distal ulna.     Problem began: 1/22/2021.   Problem occurred: fall in home   Pain is reported as 4/10 on pain scale.  General health as reported by patient is good.  Pertinent medical history includes: history of fractures, numbness/tingling, pain at night/rest, changes in skin color and weakness.     Medical allergies: none.   Surgeries include:  Orthopedic surgery and other. Other surgery history details: umbilical cyst repair (1972); abdominal muscle repair (2008).    Current medications:  None.    Current occupation is / (self-employed).   Primary job tasks include:  Computer work, driving, prolonged sitting and repetitive tasks.                  Occupational Profile Information:  Problem began: was stepping over the baby/puppy gate and tripped and fell, notes she had a minor concussion after the fall and this was assessed at the ED.    Pior functional level:  no limitations    Barriers include:lives with son, age 13, healing fx left leg  Mobility: No difficulty  Transportation: drives  Leisure activities/hobbies: has a new puppy, walk the puppy, works on the house, was painting and remolding, likes to read  Other: difficulty typing only doing it one handed    Functional Outcome Measure:   Please refer to flowsheet    Objective:    Pain Level Report:  Pain Level (Scale 0-10):   2/15/2021    At Rest 3-4    With Use 6-7       Pain Description:  Date 2/15/2021     right     Location  elbow, forearm, wrist and hand  Shoulder and elbow cubital tunnel area since the surgery has been noted    Pain Quality Aching, Dull, Numb, Sharp, Tender, Tingling and zinging    Frequency intermittent, constant or daily      Pain is worst daytime    Exacerbated by Use and typing    Relieved by heat, rest and touching and moving into a better position, OTC as needed    Progression Not better      Strength:  [x]   Contraindicated  Edema:  moderate of the  hand, thumb, index finger, long finger, ring finger and small finger  Wound/Scar: tender, clean without drainage and closed with steristrips  Palpation:  []     Normal        []   Tender       [x]  Mild         []   Moderate []   Severe   Location: volar forearm       Sensation: Decreased Median Nerve distribution per pt report and since the surgery, pt reported  ROM:  Wrist 2/15/2021 2/22/21   AROM(PROM) Right    Extension 20 25   Flexion 15 20   RD     UD     Supination 65    Pronation 20      ROM:    Thumb  2/15/2021 2/22   AROM(PROM)  Right    MP  0/30    IP  -5/50    RAbd      PAbd  55    Retropulsion      Kapandji Opposition Scale (0-10/10)  5 7       ROM:   Fingers 2/15/2021 2/15/2021    AROM(PROM) Right Flex lag    Index MP -15 2.5    PIP -10     DIP      FERRARA      Long MP -20 2.5    PIP -15     DIP      FERRARA      Ring MP -15 2.5    PIP -15     DIP      FERRARA      Small MP -10 1.75    PIP -10     DIP      FERRARA        ROM  Shoulder 2/22/2021   AROM (PROM) RIGHT   Flexion Supine ~130+   Extension WNL   Internal Rotation WNL   External Rotation 45+   Abduction 110+         Assessment:  Please refer to the daily flowsheet for treatment today, total treatment time and time spent performing 1:1 timed codes.        Plan:  Frequency:  2 X week, once daily  Duration:  for 2 weeks tapering to 1 X a week over 6 weeks      Treatment Plan:        Modalities:  Paraffin  Therapeutic Exercise:  AROM, AAROM, PROM,  "Tendon Gliding, Blocking, Reverse Blocking and Extensor Tracking  Neuromuscular re-education:  Nerve Gliding, Coordination/Dexterity, Sensory re-education, Desensitization and Kinesiotaping  Manual Techniques:  Scar mobilization, Myofascial release and Manual edema mobilization  Self Care:  Ergonomic Considerations  Orthotic Fabrication: Static forearm based orthosis    Discharge Plan:    Achieve all LTG.  Independent in home treatment program.  Reach maximal therapeutic benefit.    Home Program:   Edema management per guidelines 10 minutes on the hour every waking hour  AROM of unaffected joints 3 times per day  2/15/2021  Exercise Name: Thumb Active Range of Motion IP Joint Blocking, Sets: 1 - Reps: 10 - Sessions: 3, Notes: Hold and move the tip, in the splint and out of the splint  Exercise Name: Thumb Active Range of Motion MP Joint Blocking, Sets: 1-2 sets - Reps: 10 reps - Sessions: 3, Notes: hold the base of your thumb and move at the middle  joint of your thumb, not bending the tip joint    Exercise Name: Thumb Active Range of Motion Composite Flexion, Sets: 1  - Reps:  5 - 10reps - Sessions: 3x/day, Notes: Remove the splint, and gently move your thumb in a direction down toward the table, and then gently lift it up. Move only in a pain free range, keeping the thumb close to your palm  Exercise Name: Thumb Active Range of Motion Palmar Abduction, Sets: 1 set  - Reps: 5 reps - Sessions: 3x/day, Notes: Roll your thumb out, like making a  big C  Exercise Name: Thumb Active Range of Motion Opposition, Sets: 1-2 - Reps: 5 reps - Sessions: 3-5, Notes:  \"O\" position  to each finger as tolerated, do not push into pain.   Gentle motion within your pain tolerance..  Roll your thumb WAY out in a wide arc from first position to each finger, between each finger tips, lift thumb away with attention to lifting from the middle thumb joint.    Exercise Name: Thumb Active Range of Motion Circumduction, Sets: 1-2   - Reps: 5 - " "10x each direction - Sessions: 3, Notes: Keeping the thumb in a Capital C. (Twiddle your thumbs) Make slow circles starting from the bottom or base thumb joint and make small circles, keeping the  metacarpal and proximal phalanx in line. Do not allow the thumb to have a \"broken line\" or deviate from the straight line.   Exercise Name: Finger Active Range of Motion DIP Joint Blocking, Sets: 1-2 - Reps: 5-10 repetitions each set - Sessions: 3-5 , Notes: For your end joint of the your finger   Exercise Name: Finger Active Range of Motion PIP Joint Blocking, Sets: 1-2 - Reps: 5-10 each - Sessions: 3-5, Notes: MIDDLE JOINT MOTION involved fingers  You can do this over the edge of a table, or you can.   Hold all fingers with your other palm, and then lift and straighten the middle joint of all finger each one individually  Exercise Name: Finger Active Range of Motion FDS Flexor Tendon Gliding, Sets: 1 set  - Reps: 3-5 reps each finger - Sessions: 3, Notes: Moving each finger separately. Respect the pain, and do VERY SLOWLY. FOCUS the bending at the middle joints, and DO NOT PUSH into pain  do for all fingers  Exercise Name:  Intrinsic Finger Active Range of Motion Ab and Adduction, Sets: 1 - Reps: 5-10 - Sessions: 3-5, Notes: open all your fingers wide, then close them, focus on the fingers  Exercise Name: Finger Active Range of Motion Table Top Fist Series - Reps: 10 - Sessions: Every hour, Notes: You can hold a ball or small cylinder in your hand to help your finger to move toward a full fist  Exercise Name: Finger Active Range of Motion Tendon Glides Fist Series, Sets: 1 - Reps: 5-10 reps - Sessions: 3-5x/day, Notes: each position, 3 times, then move to the next position, 3 times, and then reach only to the point of pain for the 3rd position, NO PAIN. and you can do this going backwards: deep/long fist, regular fist, hook fist to fingers tall   wrist stays straight  hold 1-2 sec in each position  Exercise Name: Forearm " Functional Range of Motion for Pronation/Supination, Sets: 1-2 - Reps: 15-20 - Sessions: 3, Notes: Go Knee to knee:  With your splint on. You  can then try to  roll your forearm on your leg or on a table with out the support of the other hand. Also, touch right knee palm down, then right shoulder, then Left knee palm up then L shoulder  Exercise Name: Forearm Active Range of Motion Combined Pronation and Supination, Sets: 1 - Reps: 10 - Sessions: 3, Notes: Always work toward palm up, even resting in your lap. Turn your palm up and down. You may rest the arm  on a table or in  your lap and roll the arm palm up and down (emphasis on palm up).   Exercise Name: Wrist Active Range of Motion DTM/Dart Throwers Motion with Elbow on Table, Sets: 2 - Reps: 20 - Sessions: 1-2, Notes: Dart throwing motion; move the wrist back and forth, slow deliberate motion. Slow and gentle  Hold a paper ball or a very light foam ball: no gripping, just holding the ball. Move in mid range: about 30% of motion    Next Visit:  Progress ROM and follow MD Orders

## 2021-02-23 PROBLEM — M25.511 RIGHT SHOULDER PAIN: Status: ACTIVE | Noted: 2021-02-23

## 2021-02-25 ENCOUNTER — TRANSFERRED RECORDS (OUTPATIENT)
Dept: HEALTH INFORMATION MANAGEMENT | Facility: CLINIC | Age: 51
End: 2021-02-25

## 2021-02-26 ENCOUNTER — THERAPY VISIT (OUTPATIENT)
Dept: OCCUPATIONAL THERAPY | Facility: CLINIC | Age: 51
End: 2021-02-26
Payer: COMMERCIAL

## 2021-02-26 DIAGNOSIS — S52.531D CLOSED COLLES' FRACTURE OF RIGHT RADIUS WITH ROUTINE HEALING: Primary | ICD-10-CM

## 2021-02-26 DIAGNOSIS — M25.631 WRIST STIFFNESS, RIGHT: ICD-10-CM

## 2021-02-26 DIAGNOSIS — M25.511 RIGHT SHOULDER PAIN, UNSPECIFIED CHRONICITY: ICD-10-CM

## 2021-02-26 DIAGNOSIS — M25.531 RIGHT WRIST PAIN: Primary | ICD-10-CM

## 2021-02-26 DIAGNOSIS — S52.531D CLOSED COLLES' FRACTURE OF RIGHT RADIUS WITH ROUTINE HEALING: ICD-10-CM

## 2021-02-26 DIAGNOSIS — Z48.89 OTHER SPECIFIED AFTERCARE FOLLOWING SURGERY: ICD-10-CM

## 2021-02-26 PROCEDURE — 97110 THERAPEUTIC EXERCISES: CPT | Mod: GO | Performed by: OCCUPATIONAL THERAPIST

## 2021-02-26 PROCEDURE — 97112 NEUROMUSCULAR REEDUCATION: CPT | Mod: GO | Performed by: OCCUPATIONAL THERAPIST

## 2021-02-26 PROCEDURE — 97140 MANUAL THERAPY 1/> REGIONS: CPT | Mod: GO | Performed by: OCCUPATIONAL THERAPIST

## 2021-02-26 NOTE — PROGRESS NOTES
.Please refer to the daily flowsheet for treatment today, total treatment time and time spent performing 1:1 timed codes.    NOTES:   Pt reporting burning and numbness and feeling of separation from hand as pain increases.   Watch for pain syndrome

## 2021-03-01 ENCOUNTER — THERAPY VISIT (OUTPATIENT)
Dept: OCCUPATIONAL THERAPY | Facility: CLINIC | Age: 51
End: 2021-03-01
Payer: COMMERCIAL

## 2021-03-01 DIAGNOSIS — S52.531D CLOSED COLLES' FRACTURE OF RIGHT RADIUS WITH ROUTINE HEALING: ICD-10-CM

## 2021-03-01 DIAGNOSIS — M25.531 RIGHT WRIST PAIN: Primary | ICD-10-CM

## 2021-03-01 DIAGNOSIS — M25.511 RIGHT SHOULDER PAIN, UNSPECIFIED CHRONICITY: ICD-10-CM

## 2021-03-01 DIAGNOSIS — M25.631 WRIST STIFFNESS, RIGHT: ICD-10-CM

## 2021-03-01 DIAGNOSIS — Z48.89 OTHER SPECIFIED AFTERCARE FOLLOWING SURGERY: ICD-10-CM

## 2021-03-01 PROCEDURE — 97110 THERAPEUTIC EXERCISES: CPT | Mod: GO | Performed by: OCCUPATIONAL THERAPIST

## 2021-03-01 PROCEDURE — 97140 MANUAL THERAPY 1/> REGIONS: CPT | Mod: GO | Performed by: OCCUPATIONAL THERAPIST

## 2021-03-01 PROCEDURE — 97112 NEUROMUSCULAR REEDUCATION: CPT | Mod: GO | Performed by: OCCUPATIONAL THERAPIST

## 2021-03-01 NOTE — PROGRESS NOTES
SOAP note objective information for 3/1/2021.  Please refer to the daily flowsheet for treatment today, total treatment time and time spent performing 1:1 timed codes.        Diagnosis:  Right Distal radis fracture  DOI:  1/22/21  DOS:  2/2/21  Procedure:  ORIF  Post:  3w6d  Referring MD: Bear Alejo MD  Next MD visit: 3/25/21    Subjective:  Iman Alves is a 50 year old right hand dominant female.  Subjective:    Patient Health History  Iman Alves being seen for rehabilitation after ORIF/carpal tunnel surgery for broken distal radius and (non-surgical) broken distal ulna.     Problem began: 1/22/2021.   Problem occurred: fall in home   Pain is reported as 4/10 on pain scale.  General health as reported by patient is good.  Pertinent medical history includes: history of fractures, numbness/tingling, pain at night/rest, changes in skin color and weakness.     Medical allergies: none.   Surgeries include:  Orthopedic surgery and other. Other surgery history details: umbilical cyst repair (1972); abdominal muscle repair (2008).    Current medications:  None.    Current occupation is / (self-employed).   Primary job tasks include:  Computer work, driving, prolonged sitting and repetitive tasks.                  Occupational Profile Information:  Problem began: was stepping over the baby/puppy gate and tripped and fell, notes she had a minor concussion after the fall and this was assessed at the ED.    Pior functional level:  no limitations    Barriers include:lives with son, age 13, healing fx left leg  Mobility: No difficulty  Transportation: drives  Leisure activities/hobbies: has a new puppy, walk the puppy, works on the house, was painting and remolding, likes to read  Other: difficulty typing only doing it one handed    Functional Outcome Measure:   Please refer to flowsheet    Objective:    Pain Level Report:  Pain Level (Scale 0-10):   2/15/2021    At Rest 3-4    With Use 6-7       Pain Description:  Date 2/15/2021     right     Location  elbow, forearm, wrist and hand  Shoulder and elbow cubital tunnel area since the surgery has been noted    Pain Quality Aching, Dull, Numb, Sharp, Tender, Tingling and zinging    Frequency intermittent, constant or daily      Pain is worst daytime    Exacerbated by Use and typing    Relieved by heat, rest and touching and moving into a better position, OTC as needed    Progression Not better      Strength:  [x]   Contraindicated  Edema:  moderate of the  hand, thumb, index finger, long finger, ring finger and small finger  Wound/Scar: tender, clean without drainage and closed with steristrips  Palpation:  []     Normal        []   Tender       [x]  Mild         []   Moderate []   Severe   Location: volar forearm       Sensation: Decreased Median Nerve distribution per pt report and since the surgery, pt reported  ROM:  Wrist 2/15/2021 2/22/21 3/1   AROM(PROM) Right     Extension 20 25 35   Flexion 15 20 45   RD      UD      Supination Amended: 20  42   Pronation Amended: 65  70     ROM:    Thumb  2/15/2021 2/22   AROM(PROM)  Right    MP  0/30    IP  -5/50    RAbd      PAbd  55    Retropulsion      Kapandji Opposition Scale (0-10/10)  5 7       ROM:   Fingers 2/15/2021 2/15/2021    AROM(PROM) Right Flex lag    Index MP -15 2.5    PIP -10     DIP      FERRARA      Long MP -20 2.5    PIP -15     DIP      FERRARA      Ring MP -15 2.5    PIP -15     DIP      FERRARA      Small MP -10 1.75    PIP -10     DIP      FERRARA        ROM  Shoulder 2/22/2021   AROM (PROM) RIGHT   Flexion Supine ~130+   Extension WNL   Internal Rotation WNL   External Rotation 45+   Abduction 110+       Assessment:  Please refer to the daily flowsheet for treatment today, total treatment time and time spent performing 1:1 timed codes.        Plan:  Frequency:  2 X week, once daily  Duration:  for 2 weeks tapering to 1 X a week over 6 weeks      Treatment Plan:        Modalities:  Paraffin  Therapeutic  "Exercise:  AROM, AAROM, PROM, Tendon Gliding, Blocking, Reverse Blocking and Extensor Tracking  Neuromuscular re-education:  Nerve Gliding, Coordination/Dexterity, Sensory re-education, Desensitization and Kinesiotaping  Manual Techniques:  Scar mobilization, Myofascial release and Manual edema mobilization  Self Care:  Ergonomic Considerations  Orthotic Fabrication: Static forearm based orthosis    Discharge Plan:    Achieve all LTG.  Independent in home treatment program.  Reach maximal therapeutic benefit.    Home Program:   Edema management per guidelines 10 minutes on the hour every waking hour  AROM of unaffected joints 3 times per day  2/15/2021  Exercise Name: Thumb Active Range of Motion IP Joint Blocking, Sets: 1 - Reps: 10 - Sessions: 3, Notes: Hold and move the tip, in the splint and out of the splint  Exercise Name: Thumb Active Range of Motion MP Joint Blocking, Sets: 1-2 sets - Reps: 10 reps - Sessions: 3, Notes: hold the base of your thumb and move at the middle  joint of your thumb, not bending the tip joint    Exercise Name: Thumb Active Range of Motion Composite Flexion, Sets: 1  - Reps:  5 - 10reps - Sessions: 3x/day, Notes: Remove the splint, and gently move your thumb in a direction down toward the table, and then gently lift it up. Move only in a pain free range, keeping the thumb close to your palm  Exercise Name: Thumb Active Range of Motion Palmar Abduction, Sets: 1 set  - Reps: 5 reps - Sessions: 3x/day, Notes: Roll your thumb out, like making a  big C  Exercise Name: Thumb Active Range of Motion Opposition, Sets: 1-2 - Reps: 5 reps - Sessions: 3-5, Notes:  \"O\" position  to each finger as tolerated, do not push into pain.   Gentle motion within your pain tolerance..  Roll your thumb WAY out in a wide arc from first position to each finger, between each finger tips, lift thumb away with attention to lifting from the middle thumb joint.    Exercise Name: Thumb Active Range of Motion " "Circumduction, Sets: 1-2   - Reps: 5 - 10x each direction - Sessions: 3, Notes: Keeping the thumb in a Capital C. (Twiddle your thumbs) Make slow circles starting from the bottom or base thumb joint and make small circles, keeping the  metacarpal and proximal phalanx in line. Do not allow the thumb to have a \"broken line\" or deviate from the straight line.   Exercise Name: Finger Active Range of Motion DIP Joint Blocking, Sets: 1-2 - Reps: 5-10 repetitions each set - Sessions: 3-5 , Notes: For your end joint of the your finger   Exercise Name: Finger Active Range of Motion PIP Joint Blocking, Sets: 1-2 - Reps: 5-10 each - Sessions: 3-5, Notes: MIDDLE JOINT MOTION involved fingers  You can do this over the edge of a table, or you can.   Hold all fingers with your other palm, and then lift and straighten the middle joint of all finger each one individually  Exercise Name: Finger Active Range of Motion FDS Flexor Tendon Gliding, Sets: 1 set  - Reps: 3-5 reps each finger - Sessions: 3, Notes: Moving each finger separately. Respect the pain, and do VERY SLOWLY. FOCUS the bending at the middle joints, and DO NOT PUSH into pain  do for all fingers  Exercise Name:  Intrinsic Finger Active Range of Motion Ab and Adduction, Sets: 1 - Reps: 5-10 - Sessions: 3-5, Notes: open all your fingers wide, then close them, focus on the fingers  Exercise Name: Finger Active Range of Motion Table Top Fist Series - Reps: 10 - Sessions: Every hour, Notes: You can hold a ball or small cylinder in your hand to help your finger to move toward a full fist  Exercise Name: Finger Active Range of Motion Tendon Glides Fist Series, Sets: 1 - Reps: 5-10 reps - Sessions: 3-5x/day, Notes: each position, 3 times, then move to the next position, 3 times, and then reach only to the point of pain for the 3rd position, NO PAIN. and you can do this going backwards: deep/long fist, regular fist, hook fist to fingers tall   wrist stays straight  hold 1-2 sec " in each position  Exercise Name: Forearm Functional Range of Motion for Pronation/Supination, Sets: 1-2 - Reps: 15-20 - Sessions: 3, Notes: Go Knee to knee:  With your splint on. You  can then try to  roll your forearm on your leg or on a table with out the support of the other hand. Also, touch right knee palm down, then right shoulder, then Left knee palm up then L shoulder  Exercise Name: Forearm Active Range of Motion Combined Pronation and Supination, Sets: 1 - Reps: 10 - Sessions: 3, Notes: Always work toward palm up, even resting in your lap. Turn your palm up and down. You may rest the arm  on a table or in  your lap and roll the arm palm up and down (emphasis on palm up).   Exercise Name: Wrist Active Range of Motion DTM/Dart Throwers Motion with Elbow on Table, Sets: 2 - Reps: 20 - Sessions: 1-2, Notes: Dart throwing motion; move the wrist back and forth, slow deliberate motion. Slow and gentle  Hold a paper ball or a very light foam ball: no gripping, just holding the ball. Move in mid range: about 30% of motion    Next Visit:  Progress ROM and follow MD Orders

## 2021-03-08 ENCOUNTER — THERAPY VISIT (OUTPATIENT)
Dept: OCCUPATIONAL THERAPY | Facility: CLINIC | Age: 51
End: 2021-03-08
Payer: COMMERCIAL

## 2021-03-08 DIAGNOSIS — Z48.89 OTHER SPECIFIED AFTERCARE FOLLOWING SURGERY: ICD-10-CM

## 2021-03-08 DIAGNOSIS — M25.531 RIGHT WRIST PAIN: Primary | ICD-10-CM

## 2021-03-08 DIAGNOSIS — M25.631 WRIST STIFFNESS, RIGHT: ICD-10-CM

## 2021-03-08 DIAGNOSIS — S52.531D CLOSED COLLES' FRACTURE OF RIGHT RADIUS WITH ROUTINE HEALING: ICD-10-CM

## 2021-03-08 DIAGNOSIS — M25.511 RIGHT SHOULDER PAIN, UNSPECIFIED CHRONICITY: ICD-10-CM

## 2021-03-08 PROCEDURE — 97140 MANUAL THERAPY 1/> REGIONS: CPT | Mod: GO | Performed by: OCCUPATIONAL THERAPIST

## 2021-03-08 PROCEDURE — 97110 THERAPEUTIC EXERCISES: CPT | Mod: GO | Performed by: OCCUPATIONAL THERAPIST

## 2021-03-08 PROCEDURE — 97112 NEUROMUSCULAR REEDUCATION: CPT | Mod: GO | Performed by: OCCUPATIONAL THERAPIST

## 2021-03-08 PROCEDURE — 97763 ORTHC/PROSTC MGMT SBSQ ENC: CPT | Mod: GO | Performed by: OCCUPATIONAL THERAPIST

## 2021-03-08 NOTE — PROGRESS NOTES
SOAP note objective information for 3/8/2021.  Please refer to the daily flowsheet for treatment today, total treatment time and time spent performing 1:1 timed codes.        Diagnosis:  Right Distal radis fracture  DOI:  1/22/21  DOS:  2/2/21  Procedure:  ORIF  Post:  4w6d  Referring MD: Bear Alejo MD  Next MD visit: 3/25/21    Subjective:  Iman Alves is a 50 year old right hand dominant female.  Subjective:    Patient Health History  Iman Alves being seen for rehabilitation after ORIF/carpal tunnel surgery for broken distal radius and (non-surgical) broken distal ulna.     Problem began: 1/22/2021.   Problem occurred: fall in home   Pain is reported as 4/10 on pain scale.  General health as reported by patient is good.  Pertinent medical history includes: history of fractures, numbness/tingling, pain at night/rest, changes in skin color and weakness.     Medical allergies: none.   Surgeries include:  Orthopedic surgery and other. Other surgery history details: umbilical cyst repair (1972); abdominal muscle repair (2008).    Current medications:  None.    Current occupation is / (self-employed).   Primary job tasks include:  Computer work, driving, prolonged sitting and repetitive tasks.                  Occupational Profile Information:  Problem began: was stepping over the baby/puppy gate and tripped and fell, notes she had a minor concussion after the fall and this was assessed at the ED.    Pior functional level:  no limitations    Barriers include:lives with son, age 13, healing fx left leg  Mobility: No difficulty  Transportation: drives  Leisure activities/hobbies: has a new puppy, walk the puppy, works on the house, was painting and remolding, likes to read  Other: difficulty typing only doing it one handed    Functional Outcome Measure:   Please refer to flowsheet    Objective:    Pain Level Report:  Pain Level (Scale 0-10):   2/15/2021    At Rest 3-4    With Use 6-7       Pain Description:  Date 2/15/2021     right     Location  elbow, forearm, wrist and hand  Shoulder and elbow cubital tunnel area since the surgery has been noted    Pain Quality Aching, Dull, Numb, Sharp, Tender, Tingling and zinging    Frequency intermittent, constant or daily      Pain is worst daytime    Exacerbated by Use and typing    Relieved by heat, rest and touching and moving into a better position, OTC as needed    Progression Not better      Strength:  [x]   Contraindicated  Edema:  moderate of the  hand, thumb, index finger, long finger, ring finger and small finger  Wound/Scar: tender, clean without drainage and closed with steristrips  Palpation:  []     Normal        []   Tender       [x]  Mild         []   Moderate []   Severe   Location: volar forearm       Sensation: Decreased Median Nerve distribution per pt report and since the surgery, pt reported  ROM:  Wrist 2/15/2021 2/22/21 3/1 3/8/21   AROM(PROM) Right      Extension 20 25 35 40   Flexion 15 20 45 42   RD       UD       Supination Amended: 20  42    Pronation Amended: 65  70      ROM:    Thumb  2/15/2021 2/22 3/8   AROM(PROM)  Right     MP  0/30     IP  -5/50     RAbd       PAbd  55     Retropulsion       Kapandji Opposition Scale (0-10/10)  5 7 8       ROM:   Fingers 2/15/2021 2/15/2021    AROM(PROM) Right Flex lag    Index MP -15 2.5    PIP -10     DIP      FERRARA      Long MP -20 2.5    PIP -15     DIP      FERRARA      Ring MP -15 2.5    PIP -15     DIP      FERRARA      Small MP -10 1.75    PIP -10     DIP      FERRARA        ROM  Shoulder 2/22/2021   AROM (PROM) RIGHT   Flexion Supine ~130+   Extension WNL   Internal Rotation WNL   External Rotation 45+   Abduction 110+       Assessment:  Please refer to the daily flowsheet for treatment today, total treatment time and time spent performing 1:1 timed codes.        Plan:  Frequency:  2 X week, once daily  Duration:  for 4 weeks tapering to 1 X a week over 6 weeks (changed 3/8/21)      Treatment  "Plan:        Modalities:  Paraffin  Therapeutic Exercise:  AROM, AAROM, PROM, Tendon Gliding, Blocking, Reverse Blocking and Extensor Tracking  Neuromuscular re-education:  Nerve Gliding, Coordination/Dexterity, Sensory re-education, Desensitization and Kinesiotaping  Manual Techniques:  Scar mobilization, Myofascial release and Manual edema mobilization  Self Care:  Ergonomic Considerations  Orthotic Fabrication: Static forearm based orthosis    Discharge Plan:    Achieve all LTG.  Independent in home treatment program.  Reach maximal therapeutic benefit.    Home Program:   Edema management per guidelines 10 minutes on the hour every waking hour  AROM of unaffected joints 3 times per day  2/15/2021  Exercise Name: Thumb Active Range of Motion IP Joint Blocking, Sets: 1 - Reps: 10 - Sessions: 3, Notes: Hold and move the tip, in the splint and out of the splint  Exercise Name: Thumb Active Range of Motion MP Joint Blocking, Sets: 1-2 sets - Reps: 10 reps - Sessions: 3, Notes: hold the base of your thumb and move at the middle  joint of your thumb, not bending the tip joint    Exercise Name: Thumb Active Range of Motion Composite Flexion, Sets: 1  - Reps:  5 - 10reps - Sessions: 3x/day, Notes: Remove the splint, and gently move your thumb in a direction down toward the table, and then gently lift it up. Move only in a pain free range, keeping the thumb close to your palm  Exercise Name: Thumb Active Range of Motion Palmar Abduction, Sets: 1 set  - Reps: 5 reps - Sessions: 3x/day, Notes: Roll your thumb out, like making a  big C  Exercise Name: Thumb Active Range of Motion Opposition, Sets: 1-2 - Reps: 5 reps - Sessions: 3-5, Notes:  \"O\" position  to each finger as tolerated, do not push into pain.   Gentle motion within your pain tolerance..  Roll your thumb WAY out in a wide arc from first position to each finger, between each finger tips, lift thumb away with attention to lifting from the middle thumb " "joint.    Exercise Name: Thumb Active Range of Motion Circumduction, Sets: 1-2   - Reps: 5 - 10x each direction - Sessions: 3, Notes: Keeping the thumb in a Capital C. (Twiddle your thumbs) Make slow circles starting from the bottom or base thumb joint and make small circles, keeping the  metacarpal and proximal phalanx in line. Do not allow the thumb to have a \"broken line\" or deviate from the straight line.   Exercise Name: Finger Active Range of Motion DIP Joint Blocking, Sets: 1-2 - Reps: 5-10 repetitions each set - Sessions: 3-5 , Notes: For your end joint of the your finger   Exercise Name: Finger Active Range of Motion PIP Joint Blocking, Sets: 1-2 - Reps: 5-10 each - Sessions: 3-5, Notes: MIDDLE JOINT MOTION involved fingers  You can do this over the edge of a table, or you can.   Hold all fingers with your other palm, and then lift and straighten the middle joint of all finger each one individually  Exercise Name: Finger Active Range of Motion FDS Flexor Tendon Gliding, Sets: 1 set  - Reps: 3-5 reps each finger - Sessions: 3, Notes: Moving each finger separately. Respect the pain, and do VERY SLOWLY. FOCUS the bending at the middle joints, and DO NOT PUSH into pain  do for all fingers  Exercise Name:  Intrinsic Finger Active Range of Motion Ab and Adduction, Sets: 1 - Reps: 5-10 - Sessions: 3-5, Notes: open all your fingers wide, then close them, focus on the fingers  Exercise Name: Finger Active Range of Motion Table Top Fist Series - Reps: 10 - Sessions: Every hour, Notes: You can hold a ball or small cylinder in your hand to help your finger to move toward a full fist  Exercise Name: Finger Active Range of Motion Tendon Glides Fist Series, Sets: 1 - Reps: 5-10 reps - Sessions: 3-5x/day, Notes: each position, 3 times, then move to the next position, 3 times, and then reach only to the point of pain for the 3rd position, NO PAIN. and you can do this going backwards: deep/long fist, regular fist, hook fist " to fingers tall   wrist stays straight  hold 1-2 sec in each position  Exercise Name: Forearm Functional Range of Motion for Pronation/Supination, Sets: 1-2 - Reps: 15-20 - Sessions: 3, Notes: Go Knee to knee:  With your splint on. You  can then try to  roll your forearm on your leg or on a table with out the support of the other hand. Also, touch right knee palm down, then right shoulder, then Left knee palm up then L shoulder  Exercise Name: Forearm Active Range of Motion Combined Pronation and Supination, Sets: 1 - Reps: 10 - Sessions: 3, Notes: Always work toward palm up, even resting in your lap. Turn your palm up and down. You may rest the arm  on a table or in  your lap and roll the arm palm up and down (emphasis on palm up).   Exercise Name: Wrist Active Range of Motion DTM/Dart Throwers Motion with Elbow on Table, Sets: 2 - Reps: 20 - Sessions: 1-2, Notes: Dart throwing motion; move the wrist back and forth, slow deliberate motion. Slow and gentle  Hold a paper ball or a very light foam ball: no gripping, just holding the ball. Move in mid range: about 30% of motion    Next Visit:  Progress ROM and follow MD Orders

## 2021-03-09 ENCOUNTER — THERAPY VISIT (OUTPATIENT)
Dept: PHYSICAL THERAPY | Facility: CLINIC | Age: 51
End: 2021-03-09
Payer: COMMERCIAL

## 2021-03-09 DIAGNOSIS — M25.511 RIGHT SHOULDER PAIN, UNSPECIFIED CHRONICITY: Primary | ICD-10-CM

## 2021-03-09 PROCEDURE — 97140 MANUAL THERAPY 1/> REGIONS: CPT | Mod: GP | Performed by: PHYSICAL THERAPIST

## 2021-03-09 PROCEDURE — 97161 PT EVAL LOW COMPLEX 20 MIN: CPT | Mod: GP | Performed by: PHYSICAL THERAPIST

## 2021-03-09 PROCEDURE — 97110 THERAPEUTIC EXERCISES: CPT | Mod: GP | Performed by: PHYSICAL THERAPIST

## 2021-03-09 NOTE — PROGRESS NOTES
[facility] Initial Evaluation  Subjective:    Patient Health History  Iman Alves being seen for right should pain after injury/surgery (broken right arm/wrist).     Problem began: 1/22/2021.   Problem occurred: fall in home   Pain is reported as 7/10 on pain scale.  General health as reported by patient is good.  Pertinent medical history includes: history of fractures, numbness/tingling, pain at night/rest, changes in skin color and weakness.     Medical allergies: none.   Surgeries include:  Orthopedic surgery and other. Other surgery history details: umbilical cyst (age 2 years); abdominal muscle repair (unsuccessful, Dec. 2018).    Current medications:  None.    Current occupation is self-employed /.   Primary job tasks include:  Computer work, driving, prolonged sitting and repetitive tasks.                                  [facility]  Physical Therapy Initial Examination/Evaluation  March 9, 2021    Iman Alves is a 50 year old  female referred to physical therapy by Dr. Alejo for treatment of shoulder pain with Precautions/Restrictions/MD instructions none - distal radius fracture ORIF    KEY PT FINDINGS:  1.  Decrease in shoulder ROM  2.  Posterior capsule tightness of GH joint      Subjective:  Referring MD visit date: 3/10/21  DOI/onset: 1/26/21  Mechanism of injury: FOOSH injury radial and ulnar fracture - remained in sling for 11 days prior to ORIF. Was in a sling for a few days afterwards.  Starting noticing shoulder pain a few days after surgery, especially at night.   DOS 2/2/21  Previous treatment: hand therapy for wrist ORIF  Imaging: xray  Chief Complaint:   Donning and doffing shirts. Difficulty sleeping at night.     Pain: best 4 /10, worst 8/10 lateral shoulder, joint line Described as: aching, sharp Alleviated by: heat Frequency: constant Progression of symptoms since initial onset: mild  improvement Time of day when pain is worse: night time  Sleeping: difficulty  Social history:  Son, Reymundo.     Occupation: self employed -  and   Job duties:  sitting    Current HEP/exercise regimen: walking  Patient's goals are reduce pain    Pertinent PMH: none   General Health Reported by Patient: good  Return to MD:  PRN       Objective:    SHOULDER EXAMINATION  Cervical/Thoracic Screen: WNL    Static Posture:   Forward head: neg Rounded shoulders:neg Scapular winging: neg       Shoulder ROM:   AROM  Flexion  Abduction  ER in Neutral Ext/IR    Left  160  165 80 T4   Right  100 90 50 L5       SHOULDER STRENGTH:   MMT  Flexion  Abduction Scaption  ER  IR    Left  5/5  5/5 5/5  5/5  5/5    Right  3+/5  3+/5 3+/5  3+/5  4-/5      Assessment/Plan:    Patient is a 50 year old female with right side knee complaints.    Patient has the following significant findings with corresponding treatment plan.                Diagnosis 1:  Shoulder pain    Pain -  hot/cold therapy, US, electric stimulation, manual therapy, splint/taping/bracing/orthotics, self management, education and home program  Decreased ROM/flexibility - manual therapy and therapeutic exercise  Decreased joint mobility - manual therapy and therapeutic exercise  Decreased strength - therapeutic exercise and therapeutic activities  Impaired balance - neuro re-education and therapeutic activities  Decreased proprioception - neuro re-education and therapeutic activities  Impaired muscle performance - neuro re-education  Decreased function - therapeutic activities    Therapy Evaluation Codes:   1) History comprised of:   Personal factors that impact the plan of care:      None.    Comorbidity factors that impact the plan of care are:      None.     Medications impacting care: None.  2) Examination of Body Systems comprised of:   Body structures and functions that impact the plan of care:      Knee.   Activity limitations that impact the plan  of care are:      Lifting.  3) Clinical presentation characteristics are:   Stable/Uncomplicated.  4) Decision-Making    Low complexity using standardized patient assessment instrument and/or measureable assessment of functional outcome.  Cumulative Therapy Evaluation is: Low complexity.    Previous and current functional limitations:  (See Goal Flow Sheet for this information)    Short term and Long term goals: (See Goal Flow Sheet for this information)     Communication ability:  Patient appears to be able to clearly communicate and understand verbal and written communication and follow directions correctly.  Treatment Explanation - The following has been discussed with the patient:   RX ordered/plan of care  Anticipated outcomes  Possible risks and side effects  This patient would benefit from PT intervention to resume normal activities.   Rehab potential is good.    Frequency:  1 X week, once daily  Duration:  for 8 weeks  Discharge Plan:  Achieve all LTG.  Independent in home treatment program.  Reach maximal therapeutic benefit.    Please refer to the daily flowsheet for treatment today, total treatment time and time spent performing 1:1 timed codes.

## 2021-03-10 DIAGNOSIS — S52.531D CLOSED COLLES' FRACTURE OF RIGHT RADIUS WITH ROUTINE HEALING: Primary | ICD-10-CM

## 2021-03-15 ENCOUNTER — THERAPY VISIT (OUTPATIENT)
Dept: OCCUPATIONAL THERAPY | Facility: CLINIC | Age: 51
End: 2021-03-15
Payer: COMMERCIAL

## 2021-03-15 DIAGNOSIS — Z48.89 OTHER SPECIFIED AFTERCARE FOLLOWING SURGERY: ICD-10-CM

## 2021-03-15 DIAGNOSIS — M25.531 RIGHT WRIST PAIN: ICD-10-CM

## 2021-03-15 DIAGNOSIS — S52.531D CLOSED COLLES' FRACTURE OF RIGHT RADIUS WITH ROUTINE HEALING: ICD-10-CM

## 2021-03-15 DIAGNOSIS — M25.511 RIGHT SHOULDER PAIN, UNSPECIFIED CHRONICITY: ICD-10-CM

## 2021-03-15 DIAGNOSIS — M25.631 WRIST STIFFNESS, RIGHT: Primary | ICD-10-CM

## 2021-03-15 PROCEDURE — 97140 MANUAL THERAPY 1/> REGIONS: CPT | Mod: GO | Performed by: OCCUPATIONAL THERAPIST

## 2021-03-15 PROCEDURE — 97763 ORTHC/PROSTC MGMT SBSQ ENC: CPT | Mod: GO | Performed by: OCCUPATIONAL THERAPIST

## 2021-03-15 PROCEDURE — 97110 THERAPEUTIC EXERCISES: CPT | Mod: GO | Performed by: OCCUPATIONAL THERAPIST

## 2021-03-16 ENCOUNTER — THERAPY VISIT (OUTPATIENT)
Dept: PHYSICAL THERAPY | Facility: CLINIC | Age: 51
End: 2021-03-16
Payer: COMMERCIAL

## 2021-03-16 DIAGNOSIS — S52.531D CLOSED COLLES' FRACTURE OF RIGHT RADIUS WITH ROUTINE HEALING: ICD-10-CM

## 2021-03-16 DIAGNOSIS — M25.511 SHOULDER PAIN, RIGHT: Primary | ICD-10-CM

## 2021-03-16 PROCEDURE — 97140 MANUAL THERAPY 1/> REGIONS: CPT | Mod: GP | Performed by: PHYSICAL THERAPIST

## 2021-03-16 PROCEDURE — 97110 THERAPEUTIC EXERCISES: CPT | Mod: GP | Performed by: PHYSICAL THERAPIST

## 2021-03-22 ENCOUNTER — THERAPY VISIT (OUTPATIENT)
Dept: OCCUPATIONAL THERAPY | Facility: CLINIC | Age: 51
End: 2021-03-22
Payer: COMMERCIAL

## 2021-03-22 ENCOUNTER — OFFICE VISIT (OUTPATIENT)
Dept: ORTHOPEDICS | Facility: CLINIC | Age: 51
End: 2021-03-22
Payer: COMMERCIAL

## 2021-03-22 ENCOUNTER — THERAPY VISIT (OUTPATIENT)
Dept: PHYSICAL THERAPY | Facility: CLINIC | Age: 51
End: 2021-03-22
Payer: COMMERCIAL

## 2021-03-22 ENCOUNTER — ANCILLARY PROCEDURE (OUTPATIENT)
Dept: GENERAL RADIOLOGY | Facility: CLINIC | Age: 51
End: 2021-03-22
Attending: FAMILY MEDICINE
Payer: COMMERCIAL

## 2021-03-22 VITALS — BODY MASS INDEX: 17.42 KG/M2 | WEIGHT: 102 LBS | HEIGHT: 64 IN

## 2021-03-22 DIAGNOSIS — S52.531D CLOSED COLLES' FRACTURE OF RIGHT RADIUS WITH ROUTINE HEALING: ICD-10-CM

## 2021-03-22 DIAGNOSIS — M25.511 RIGHT SHOULDER PAIN, UNSPECIFIED CHRONICITY: ICD-10-CM

## 2021-03-22 DIAGNOSIS — M25.511 ACUTE PAIN OF RIGHT SHOULDER: Primary | ICD-10-CM

## 2021-03-22 DIAGNOSIS — M25.631 WRIST STIFFNESS, RIGHT: Primary | ICD-10-CM

## 2021-03-22 DIAGNOSIS — M25.511 ACUTE PAIN OF RIGHT SHOULDER: ICD-10-CM

## 2021-03-22 DIAGNOSIS — M25.511 SHOULDER PAIN, RIGHT: Primary | ICD-10-CM

## 2021-03-22 DIAGNOSIS — M25.531 RIGHT WRIST PAIN: ICD-10-CM

## 2021-03-22 DIAGNOSIS — Z48.89 OTHER SPECIFIED AFTERCARE FOLLOWING SURGERY: ICD-10-CM

## 2021-03-22 PROCEDURE — 97530 THERAPEUTIC ACTIVITIES: CPT | Mod: GP | Performed by: PHYSICAL THERAPIST

## 2021-03-22 PROCEDURE — 97018 PARAFFIN BATH THERAPY: CPT | Mod: GO | Performed by: OCCUPATIONAL THERAPIST

## 2021-03-22 PROCEDURE — 97535 SELF CARE MNGMENT TRAINING: CPT | Mod: GO | Performed by: OCCUPATIONAL THERAPIST

## 2021-03-22 PROCEDURE — 97110 THERAPEUTIC EXERCISES: CPT | Mod: GO | Performed by: OCCUPATIONAL THERAPIST

## 2021-03-22 PROCEDURE — 99203 OFFICE O/P NEW LOW 30 MIN: CPT | Performed by: FAMILY MEDICINE

## 2021-03-22 PROCEDURE — 97140 MANUAL THERAPY 1/> REGIONS: CPT | Mod: GO | Performed by: OCCUPATIONAL THERAPIST

## 2021-03-22 PROCEDURE — 97140 MANUAL THERAPY 1/> REGIONS: CPT | Mod: GP | Performed by: PHYSICAL THERAPIST

## 2021-03-22 PROCEDURE — 73030 X-RAY EXAM OF SHOULDER: CPT | Mod: RT | Performed by: RADIOLOGY

## 2021-03-22 ASSESSMENT — MIFFLIN-ST. JEOR: SCORE: 1057.67

## 2021-03-22 NOTE — LETTER
Date:March 27, 2021      Patient was self referred, no letter generated. Do not send.        [facility] Health Information

## 2021-03-22 NOTE — PROGRESS NOTES
Hand Therapy Progress Note  3/22/2021  Reporting period: 2/15/22 to current date      Diagnosis:  Right Distal radis fracture  DOI:  1/22/21  DOS:  2/2/21  Procedure:  ORIF  Post: 6w 6days  Referring MD: Bear Alejo MD @ [facility]    Next MD visit: 3/25/21    Subjective:  Iman Alves is a 50 year old right hand dominant female.  S:  Subjective changes as noted by patient: Subjective: the fingers feel more swollen and spllint is tight kendra at night and to the fingers. Pt notes the splint provides improve support of the volar wrist and it is more snug about the forearm. Pt notes she has difficulty getting the fork/spoon to her mouth, can't find the right motion  Functional changes noted by patient: Improvement in Self Care Tasks (dressing, bathing), Work Tasks, Household Chores and Driving   Decreased Performance in Self Care Tasks (eating) and Sleep Patterns  Response to previous treatment:  good and fair  Patient has noted adverse reaction to:   None          Patient Health History  Iman Alves being seen for rehabilitation after ORIF/carpal tunnel surgery for broken distal radius and (non-surgical) broken distal ulna.     Problem began: 1/22/2021.   Problem occurred: fall in home   Pain is reported as 4/10 on pain scale.  General health as reported by patient is good.  Pertinent medical history includes: history of fractures, numbness/tingling, pain at night/rest, changes in skin color and weakness.     Medical allergies: none.   Surgeries include:  Orthopedic surgery and other. Other surgery history details: umbilical cyst repair (1972); abdominal muscle repair (2008).    Current medications:  None.    Current occupation is / (self-employed).   Primary job tasks include:  Computer work, driving, prolonged sitting and repetitive tasks.                  Occupational Profile Information:  Problem began: was stepping over the baby/puppy gate and tripped and fell, notes she had a  minor concussion after the fall and this was assessed at the ED.    Pior functional level:  no limitations    Barriers include:lives with son, age 13, healing fx left leg  Mobility: No difficulty  Transportation: drives  Leisure activities/hobbies: has a new puppy, walk the puppy, works on the house, was painting and remolding, likes to read  Other: difficulty typing only doing it one handed    Functional Outcome Measure:   Please refer to flowsheet    Objective:    Pain Level Report:  Pain Level (Scale 0-10):   2/15/2021 3/22   At Rest 3-4 0   With Use 6-7 6-8     Pain Description:  Date 2/15/2021 3/22    right     Location  elbow, forearm, wrist and hand  Shoulder and elbow cubital tunnel area since the surgery has been noted    Pain Quality Aching, Dull, Numb, Sharp, Tender, Tingling and zinging Can be sharp if I reach too quickly or far, but overall better, even with a bump to the hand can be shooting, the shoulder does limit what I can do with the hand, or inadvertent wt bearing as when sleeping, or turning the hand palm up down. Pain subsides after the incident   Frequency intermittent, constant or daily   intermittent   Pain is worst daytime    Exacerbated by Use and typing    Relieved by heat, rest and touching and moving into a better position, OTC as needed    Progression Not better improving     Strength:  [x]   Contraindicated  Edema:  mild of the  hand, thumb, fingers and wrist, improving  Wound/Scar: closed, pink and mildly tender and mildly adhered  Palpation:  []     Normal        [x]   Tender       [x]  Mild         []   Moderate []   Severe   Location: volar forearm       Sensation: IMPROVING Decreased Median Nerve distribution per pt report and since the surgery, pt reported  ROM:  Wrist 2/15/2021 2/22/21 3/1 3/8/21 3/22   AROM(PROM) Right       Extension 20 25 35 40 35  POST: 45   Flexion 15 20 45 42 35  POST: 45   RD        UD        Supination Amended: 20  42  55   Pronation Amended: 65  70  70  "    ROM:    Thumb  2/15/2021 2/22 3/22   AROM(PROM)  Right     MP  0/30     IP  -5/50     RAbd       PAbd  55     Retropulsion       Kapandji Opposition Scale (0-10/10)  5 7 8       ROM:   Fingers 2/15/2021 2/15/2021 3/22   AROM(PROM) Right Flex lag    Index MP -15 2.5 0   PIP -10     DIP      FERRARA      Long MP -20 2.5 0   PIP -15     DIP      FERRARA      Ring MP -15 2.5 0   PIP -15     DIP      FERRARA      Small MP -10 1.75 0   PIP -10     DIP      FERRARA        ROM  Shoulder 2/22/2021   AROM (PROM) RIGHT   Flexion Supine ~130+   Extension WNL   Internal Rotation WNL   External Rotation 45+   Abduction 110+     Assessment: Pt notes the shoulder is her main limiter to RUE function  Response to therapy has been improvement to:  ROM of Elbow:  All Planes  Wrist:  All Planes, slowly progressing  Thumb:  All Planes, with limitation in thumb abd/ext and flexion  Fingers: All Planes  Edema:  edema is more mobile  Pain:  frequency is less and less tender over affected area  Work Performance:  Using right hand more at work, but not \"normal\" yet.   Self Care Skills:  Using the hand more  Paresthesias:  Median nerve - less intense numbness and tingling  Sensitivity:  scar healing and she is able to palpate the scar and light massage    Overall Assessment:  Patient is becoming more independent in home exercise program  Patient would benefit from continued therapy to achieve rehab potential  STG/LTG:  STGoals have been reviewed and progress or achievement has occurred;  see goal sheet for details and updates.  I have re-evaluated this patient and find that the nature, scope, duration and intensity of the therapy is appropriate for the medical condition of the patient.        Plan:  Frequency:  1 X a week , once daily  Duration:  for  8 weeks       Treatment Plan:   Modalities:  Paraffin  Therapeutic Exercise:  AROM, AAROM, PROM, Tendon Gliding, Blocking, Reverse Blocking and Extensor Tracking  Neuromuscular re-education:  Nerve Gliding, " "Coordination/Dexterity, Sensory re-education, Desensitization and Kinesiotaping  Manual Techniques:  Scar mobilization, Myofascial release and Manual edema mobilization  Self Care:  Ergonomic Considerations  Orthotic Fabrication: Static forearm based orthosis    Discharge Plan:    Achieve all LTG.  Independent in home treatment program.  Reach maximal therapeutic benefit.    Home Program:   Edema management per guidelines 10 minutes on the hour every waking hour  AROM of unaffected joints 3 times per day  2/15/2021  Exercise Name: Thumb Active Range of Motion IP Joint Blocking, Sets: 1 - Reps: 10 - Sessions: 3, Notes: Hold and move the tip, in the splint and out of the splint  Exercise Name: Thumb Active Range of Motion MP Joint Blocking, Sets: 1-2 sets - Reps: 10 reps - Sessions: 3, Notes: hold the base of your thumb and move at the middle  joint of your thumb, not bending the tip joint    Exercise Name: Thumb Active Range of Motion Composite Flexion, Sets: 1  - Reps:  5 - 10reps - Sessions: 3x/day, Notes: Remove the splint, and gently move your thumb in a direction down toward the table, and then gently lift it up. Move only in a pain free range, keeping the thumb close to your palm  Exercise Name: Thumb Active Range of Motion Palmar Abduction, Sets: 1 set  - Reps: 5 reps - Sessions: 3x/day, Notes: Roll your thumb out, like making a  big C  Exercise Name: Thumb Active Range of Motion Opposition, Sets: 1-2 - Reps: 5 reps - Sessions: 3-5, Notes:  \"O\" position  to each finger as tolerated, do not push into pain.   Gentle motion within your pain tolerance..  Roll your thumb WAY out in a wide arc from first position to each finger, between each finger tips, lift thumb away with attention to lifting from the middle thumb joint.    Exercise Name: Thumb Active Range of Motion Circumduction, Sets: 1-2   - Reps: 5 - 10x each direction - Sessions: 3, Notes: Keeping the thumb in a Capital C. (Twiddle your thumbs) Make slow " "circles starting from the bottom or base thumb joint and make small circles, keeping the  metacarpal and proximal phalanx in line. Do not allow the thumb to have a \"broken line\" or deviate from the straight line.   Exercise Name: Finger Active Range of Motion DIP Joint Blocking, Sets: 1-2 - Reps: 5-10 repetitions each set - Sessions: 3-5 , Notes: For your end joint of the your finger   Exercise Name: Finger Active Range of Motion PIP Joint Blocking, Sets: 1-2 - Reps: 5-10 each - Sessions: 3-5, Notes: MIDDLE JOINT MOTION involved fingers  You can do this over the edge of a table, or you can.   Hold all fingers with your other palm, and then lift and straighten the middle joint of all finger each one individually  Exercise Name: Finger Active Range of Motion FDS Flexor Tendon Gliding, Sets: 1 set  - Reps: 3-5 reps each finger - Sessions: 3, Notes: Moving each finger separately. Respect the pain, and do VERY SLOWLY. FOCUS the bending at the middle joints, and DO NOT PUSH into pain  do for all fingers  Exercise Name:  Intrinsic Finger Active Range of Motion Ab and Adduction, Sets: 1 - Reps: 5-10 - Sessions: 3-5, Notes: open all your fingers wide, then close them, focus on the fingers  Exercise Name: Finger Active Range of Motion Table Top Fist Series - Reps: 10 - Sessions: Every hour, Notes: You can hold a ball or small cylinder in your hand to help your finger to move toward a full fist  Exercise Name: Finger Active Range of Motion Tendon Glides Fist Series, Sets: 1 - Reps: 5-10 reps - Sessions: 3-5x/day, Notes: each position, 3 times, then move to the next position, 3 times, and then reach only to the point of pain for the 3rd position, NO PAIN. and you can do this going backwards: deep/long fist, regular fist, hook fist to fingers tall   wrist stays straight  hold 1-2 sec in each position  Exercise Name: Forearm Functional Range of Motion for Pronation/Supination, Sets: 1-2 - Reps: 15-20 - Sessions: 3, Notes: Go Knee " to knee:  With your splint on. You  can then try to  roll your forearm on your leg or on a table with out the support of the other hand. Also, touch right knee palm down, then right shoulder, then Left knee palm up then L shoulder  Exercise Name: Forearm Active Range of Motion Combined Pronation and Supination, Sets: 1 - Reps: 10 - Sessions: 3, Notes: Always work toward palm up, even resting in your lap. Turn your palm up and down. You may rest the arm  on a table or in  your lap and roll the arm palm up and down (emphasis on palm up).   Exercise Name: Wrist Active Range of Motion DTM/Dart Throwers Motion with Elbow on Table, Sets: 2 - Reps: 20 - Sessions: 1-2, Notes: Dart throwing motion; move the wrist back and forth, slow deliberate motion. Slow and gentle  Hold a paper ball or a very light foam ball: no gripping, just holding the ball. Move in mid range: about 30% of motion    Next Visit:  Progress ROM and follow MD Orders for moving to strengthening

## 2021-03-22 NOTE — LETTER
3/22/2021         RE: Iman Alves  [address]  [place] [address]        Dear Colleague,    Thank you for referring your patient, Iman Alves, to the [facility]. Please see a copy of my visit note below.      [facility]  SPORTS & ORTHOPEDIC CLINIC VISIT     Mar 22, 2021        ASSESSMENT & PLAN    50-year-old with right shoulder pain after fall in January.  Her stiffness would be consistent with adhesive capsulitis however given her recent injury there is concern for rotator cuff pathology as well.    Reviewed imaging and assessment with patient in detail  Discussed acute and long-term treatment of adhesive capsulitis.  This could include continued physical therapy.  Steroid injection would help with symptom control but would not likely lead to any sooner resolution of the overall problem.  Given the occurrence after a fall and some elements of rotator cuff pathology on exam I would recommend further evaluation with MRI at the current time.  She will follow-up virtually afterwards to discuss results.    Vu Sotelo MD  [facility]    -----  Chief Complaint   Patient presents with     Right Shoulder - Pain       SUBJECTIVE  mIan Alves is a/an 50 year old female who is seen in consultation at the request of PT for evaluation of  R shoulder pain.     The patient is seen by themselves.  The patient is Right handed    Pain seems to be noticeable near RC insertion point on humerus.    Onset: 3 month(s) ago. Reports FOOSH BRITTA on 1/22/21 and fractured her R wrist.  Not have shoulder pain at the time of the injury.  However a few days later while in a sling for her right wrist injury she noted some lateral shoulder pain.  Shoulder pain has gradually gotten worse since that time.  Now her most concerning symptom is stiffness.  She notices Motrin when she is reaching behind to put on a  "coat.  Location of Pain: right shoulder  Worsened by: motion  Better with: rest, some gentle scapula exercises  Treatments tried: rest/activity avoidance and gentle stretches  Associated symptoms: pain    Orthopedic/Surgical history: NO  Social History/Occupation:       REVIEW OF SYSTEMS:    Do you have fever, chills, weight loss? No    Do you have any vision problems? No    Do you have any chest pain or edema? No    Do you have any shortness of breath or wheezing?  No    Do you have stomach problems? No    Do you have any numbness or focal weakness? No    Do you have diabetes? No    Do you have problems with bleeding or clotting? No    Do you have an rashes or other skin lesions? No    OBJECTIVE:  Ht 1.626 m (5' 4\")   Wt 46.3 kg (102 lb)   BMI 17.51 kg/m       EXAM:  Alert, pleasant and conversational    Neck with full AROM, non-tender to midline cervical and upper thoracic spine palpation, negative Spurling's.  Elbow with FROM and non-tender to palpation      right shoulder:   Skin intact. No skin changes, deformity or atrophy    AROM:   Forward Flexion: 165  180 on left, pain with flexion from 90 degrees and above.    External rotation: 60  90 on left   Internal rotation: L2, T7 on left     Strength testing:   Abduction: 4+/5,   External rotation: 4+/5   Internal rotation 5/5     Deltoids 5/5, Biceps 5/5, Triceps 5/5,  5/5.    Palpation: negative TTP of the Acromioclavicular joint  negative TTP of Sternoclavicular joint  negative TTP of posterior glenoid  negative TTP of scapular borders  negative TTP of the bicipital tendon.     Special Tests: positive  Camara test  positive  Neer's test.   positive [place]'s test   Negative Speed's test.    Neurovascularly intact bilateral upper extremities.      RADIOLOGY:    4 view xrays of right yohannes performed and reviewed independently demonstrating no acute fracture or dislocation.  No significant degenerative disease. See EMR for formal radiology " report.                    Again, thank you for allowing me to participate in the care of your patient.        Sincerely,        Vu Sotelo MD

## 2021-03-22 NOTE — PATIENT INSTRUCTIONS
Call [phone] to schedule MRI through [facility]  If you would like to schedule elsewhere, provide us with the location and we can fax an order.     Contact our clinic if you would like to try a shoulder injection     Vu Sotelo MD  [facility]  [facility]  [address] [place] [id number]    Phone: [phone]  Fax: [phone]

## 2021-03-22 NOTE — PROGRESS NOTES
[facility]  SPORTS & ORTHOPEDIC CLINIC VISIT     Mar 22, 2021        ASSESSMENT & PLAN    50-year-old with right shoulder pain after fall in January.  Her stiffness would be consistent with adhesive capsulitis however given her recent injury there is concern for rotator cuff pathology as well.    Reviewed imaging and assessment with patient in detail  Discussed acute and long-term treatment of adhesive capsulitis.  This could include continued physical therapy.  Steroid injection would help with symptom control but would not likely lead to any sooner resolution of the overall problem.  Given the occurrence after a fall and some elements of rotator cuff pathology on exam I would recommend further evaluation with MRI at the current time.  She will follow-up virtually afterwards to discuss results.    Vu Sotelo MD  [facility]    -----  Chief Complaint   Patient presents with     Right Shoulder - Pain       SUBJECTIVE  Iman Alves is a/an 50 year old female who is seen in consultation at the request of PT for evaluation of  R shoulder pain.     The patient is seen by themselves.  The patient is Right handed    Pain seems to be noticeable near RC insertion point on humerus.    Onset: 3 month(s) ago. Reports FOOSH BRITTA on 1/22/21 and fractured her R wrist.  Not have shoulder pain at the time of the injury.  However a few days later while in a sling for her right wrist injury she noted some lateral shoulder pain.  Shoulder pain has gradually gotten worse since that time.  Now her most concerning symptom is stiffness.  She notices Motrin when she is reaching behind to put on a coat.  Location of Pain: right shoulder  Worsened by: motion  Better with: rest, some gentle scapula exercises  Treatments tried: rest/activity avoidance and gentle stretches  Associated symptoms: pain    Orthopedic/Surgical history: NO  Social History/Occupation: medical  "writer      REVIEW OF SYSTEMS:    Do you have fever, chills, weight loss? No    Do you have any vision problems? No    Do you have any chest pain or edema? No    Do you have any shortness of breath or wheezing?  No    Do you have stomach problems? No    Do you have any numbness or focal weakness? No    Do you have diabetes? No    Do you have problems with bleeding or clotting? No    Do you have an rashes or other skin lesions? No    OBJECTIVE:  Ht 1.626 m (5' 4\")   Wt 46.3 kg (102 lb)   BMI 17.51 kg/m       EXAM:  Alert, pleasant and conversational    Neck with full AROM, non-tender to midline cervical and upper thoracic spine palpation, negative Spurling's.  Elbow with FROM and non-tender to palpation      right shoulder:   Skin intact. No skin changes, deformity or atrophy    AROM:   Forward Flexion: 165  180 on left, pain with flexion from 90 degrees and above.    External rotation: 60  90 on left   Internal rotation: L2, T7 on left     Strength testing:   Abduction: 4+/5,   External rotation: 4+/5   Internal rotation 5/5     Deltoids 5/5, Biceps 5/5, Triceps 5/5,  5/5.    Palpation: negative TTP of the Acromioclavicular joint  negative TTP of Sternoclavicular joint  negative TTP of posterior glenoid  negative TTP of scapular borders  negative TTP of the bicipital tendon.     Special Tests: positive  Camara test  positive  Neer's test.   positive [place]'s test   Negative Speed's test.    Neurovascularly intact bilateral upper extremities.      RADIOLOGY:    4 view xrays of right shoudler performed and reviewed independently demonstrating no acute fracture or dislocation.  No significant degenerative disease. See EMR for formal radiology report.                "

## 2021-03-23 ENCOUNTER — TELEPHONE (OUTPATIENT)
Dept: ORTHOPEDICS | Facility: CLINIC | Age: 51
End: 2021-03-23

## 2021-03-23 NOTE — TELEPHONE ENCOUNTER
[facility] Call Center    Phone Message    May a detailed message be left on voicemail: yes     Reason for Call: Other: Patient would like order sent to [facility] for MRI. Please fax order to [phone]     Action Taken: Message routed to:  [facility] ([facility]): ortho    Travel Screening: Not Applicable

## 2021-03-25 ENCOUNTER — TRANSFERRED RECORDS (OUTPATIENT)
Dept: HEALTH INFORMATION MANAGEMENT | Facility: CLINIC | Age: 51
End: 2021-03-25

## 2021-03-29 ENCOUNTER — THERAPY VISIT (OUTPATIENT)
Dept: OCCUPATIONAL THERAPY | Facility: CLINIC | Age: 51
End: 2021-03-29
Payer: COMMERCIAL

## 2021-03-29 DIAGNOSIS — M25.631 WRIST STIFFNESS, RIGHT: Primary | ICD-10-CM

## 2021-03-29 DIAGNOSIS — Z48.89 OTHER SPECIFIED AFTERCARE FOLLOWING SURGERY: ICD-10-CM

## 2021-03-29 DIAGNOSIS — S52.531D CLOSED COLLES' FRACTURE OF RIGHT RADIUS WITH ROUTINE HEALING: ICD-10-CM

## 2021-03-29 DIAGNOSIS — M25.531 RIGHT WRIST PAIN: ICD-10-CM

## 2021-03-29 DIAGNOSIS — M25.511 RIGHT SHOULDER PAIN, UNSPECIFIED CHRONICITY: ICD-10-CM

## 2021-03-29 PROCEDURE — 97018 PARAFFIN BATH THERAPY: CPT | Mod: GO | Performed by: OCCUPATIONAL THERAPIST

## 2021-03-29 PROCEDURE — 97110 THERAPEUTIC EXERCISES: CPT | Mod: GO | Performed by: OCCUPATIONAL THERAPIST

## 2021-03-29 PROCEDURE — 97140 MANUAL THERAPY 1/> REGIONS: CPT | Mod: GO | Performed by: OCCUPATIONAL THERAPIST

## 2021-03-29 PROCEDURE — 97112 NEUROMUSCULAR REEDUCATION: CPT | Mod: GO | Performed by: OCCUPATIONAL THERAPIST

## 2021-03-29 NOTE — PROGRESS NOTES
SOAP note objective information for 3/29/2021.  Please refer to the daily flowsheet for treatment today, total treatment time and time spent performing 1:1 timed codes.          Diagnosis:  Right Distal radis fracture  DOI:  1/22/21  DOS:  2/2/21  Procedure:  ORIF  Post: 6w 6days  Referring MD: Bear Alejo MD @ [facility]    Next MD visit: 3/25/21    Subjective:  Iman Alves is a 50 year old right hand dominant female.  S:  Please refer to the daily flowsheet for treatment today, total treatment time and time spent performing 1:1 timed codes.              Patient Health History  Iman Alves being seen for rehabilitation after ORIF/carpal tunnel surgery for broken distal radius and (non-surgical) broken distal ulna.     Problem began: 1/22/2021.   Problem occurred: fall in home   Pain is reported as 4/10 on pain scale.  General health as reported by patient is good.  Pertinent medical history includes: history of fractures, numbness/tingling, pain at night/rest, changes in skin color and weakness.     Medical allergies: none.   Surgeries include:  Orthopedic surgery and other. Other surgery history details: umbilical cyst repair (1972); abdominal muscle repair (2008).    Current medications:  None.    Current occupation is / (self-employed).   Primary job tasks include:  Computer work, driving, prolonged sitting and repetitive tasks.                  Occupational Profile Information:  Problem began: was stepping over the baby/puppy gate and tripped and fell, notes she had a minor concussion after the fall and this was assessed at the ED.    Pior functional level:  no limitations    Barriers include:lives with son, age 13, healing fx left leg  Mobility: No difficulty  Transportation: drives  Leisure activities/hobbies: has a new puppy, walk the puppy, works on the house, was painting and remolding, likes to read  Other: difficulty typing only doing it one handed    Functional  Outcome Measure:   Please refer to flowsheet    Objective:    Pain Level Report:  Pain Level (Scale 0-10):   2/15/2021 3/22    At Rest 3-4 0    With Use 6-7 6-8      Pain Description:  Date 2/15/2021 3/22     right      Location  elbow, forearm, wrist and hand  Shoulder and elbow cubital tunnel area since the surgery has been noted     Pain Quality Aching, Dull, Numb, Sharp, Tender, Tingling and zinging Can be sharp if I reach too quickly or far, but overall better, even with a bump to the hand can be shooting, the shoulder does limit what I can do with the hand, or inadvertent wt bearing as when sleeping, or turning the hand palm up down. Pain subsides after the incident    Frequency intermittent, constant or daily   intermittent    Pain is worst daytime     Exacerbated by Use and typing     Relieved by heat, rest and touching and moving into a better position, OTC as needed     Progression Not better improving      Strength:  [x]   Contraindicated  Edema:  mild of the  hand, thumb, fingers and wrist, improving  Wound/Scar: closed, pink and mildly tender and mildly adhered  Palpation:  []     Normal        [x]   Tender       [x]  Mild         []   Moderate []   Severe   Location: volar forearm       Sensation: IMPROVING Decreased Median Nerve distribution per pt report and since the surgery, pt reported  ROM:  Wrist 2/15/2021 2/22/21 3/1 3/8/21 3/22 3/29   AROM(PROM) Right        Extension 20 25 35 40 35  POST: 45 35   Flexion 15 20 45 42 35  POST: 45 35   RD         UD         Supination Amended: 20  42  55    Pronation Amended: 65  70  70      ROM:    Thumb  2/15/2021 2/22 3/22    AROM(PROM)  Right      MP  0/30      IP  -5/50      RAbd        PAbd  55      Retropulsion        Kapandji Opposition Scale (0-10/10)  5 7 8        ROM:   Fingers 2/15/2021 2/15/2021 3/22   AROM(PROM) Right Flex lag    Index MP -15 2.5 0   PIP -10     DIP      FERRARA      Long MP -20 2.5 0   PIP -15     DIP      FERRARA      Ring MP -15 2.5 0    PIP -15     DIP      FERRARA      Small MP -10 1.75 0   PIP -10     DIP      FERRARA        ROM  Shoulder 2/22/2021   AROM (PROM) RIGHT   Flexion Supine ~130+   Extension WNL   Internal Rotation WNL   External Rotation 45+   Abduction 110+     Assessment: Pt notes the shoulder is her main limiter to RUE function  Please refer to the daily flowsheet for treatment today, total treatment time and time spent performing 1:1 timed codes.        Plan:  Frequency:  1 X a week , once daily  Duration:  for  8 weeks       Treatment Plan:   Modalities:  Paraffin  Therapeutic Exercise:  AROM, AAROM, PROM, Tendon Gliding, Blocking, Reverse Blocking and Extensor Tracking  Neuromuscular re-education:  Nerve Gliding, Coordination/Dexterity, Sensory re-education, Desensitization and Kinesiotaping  Manual Techniques:  Scar mobilization, Myofascial release and Manual edema mobilization  Self Care:  Ergonomic Considerations  Orthotic Fabrication: Static forearm based orthosis    Discharge Plan:    Achieve all LTG.  Independent in home treatment program.  Reach maximal therapeutic benefit.    Home Program:   Edema management per guidelines 10 minutes on the hour every waking hour  AROM of unaffected joints 3 times per day  2/15/2021  Exercise Name: Thumb Active Range of Motion IP Joint Blocking, Sets: 1 - Reps: 10 - Sessions: 3, Notes: Hold and move the tip, in the splint and out of the splint  Exercise Name: Thumb Active Range of Motion MP Joint Blocking, Sets: 1-2 sets - Reps: 10 reps - Sessions: 3, Notes: hold the base of your thumb and move at the middle  joint of your thumb, not bending the tip joint    Exercise Name: Thumb Active Range of Motion Composite Flexion, Sets: 1  - Reps:  5 - 10reps - Sessions: 3x/day, Notes: Remove the splint, and gently move your thumb in a direction down toward the table, and then gently lift it up. Move only in a pain free range, keeping the thumb close to your palm  Exercise Name: Thumb Active Range of  "Motion Palmar Abduction, Sets: 1 set  - Reps: 5 reps - Sessions: 3x/day, Notes: Roll your thumb out, like making a  big C  Exercise Name: Thumb Active Range of Motion Opposition, Sets: 1-2 - Reps: 5 reps - Sessions: 3-5, Notes:  \"O\" position  to each finger as tolerated, do not push into pain.   Gentle motion within your pain tolerance..  Roll your thumb WAY out in a wide arc from first position to each finger, between each finger tips, lift thumb away with attention to lifting from the middle thumb joint.    Exercise Name: Thumb Active Range of Motion Circumduction, Sets: 1-2   - Reps: 5 - 10x each direction - Sessions: 3, Notes: Keeping the thumb in a Capital C. (Twiddle your thumbs) Make slow circles starting from the bottom or base thumb joint and make small circles, keeping the  metacarpal and proximal phalanx in line. Do not allow the thumb to have a \"broken line\" or deviate from the straight line.   Exercise Name: Finger Active Range of Motion DIP Joint Blocking, Sets: 1-2 - Reps: 5-10 repetitions each set - Sessions: 3-5 , Notes: For your end joint of the your finger   Exercise Name: Finger Active Range of Motion PIP Joint Blocking, Sets: 1-2 - Reps: 5-10 each - Sessions: 3-5, Notes: MIDDLE JOINT MOTION involved fingers  You can do this over the edge of a table, or you can.   Hold all fingers with your other palm, and then lift and straighten the middle joint of all finger each one individually  Exercise Name: Finger Active Range of Motion FDS Flexor Tendon Gliding, Sets: 1 set  - Reps: 3-5 reps each finger - Sessions: 3, Notes: Moving each finger separately. Respect the pain, and do VERY SLOWLY. FOCUS the bending at the middle joints, and DO NOT PUSH into pain  do for all fingers  Exercise Name:  Intrinsic Finger Active Range of Motion Ab and Adduction, Sets: 1 - Reps: 5-10 - Sessions: 3-5, Notes: open all your fingers wide, then close them, focus on the fingers  Exercise Name: Finger Active Range of Motion " Table Top Fist Series - Reps: 10 - Sessions: Every hour, Notes: You can hold a ball or small cylinder in your hand to help your finger to move toward a full fist  Exercise Name: Finger Active Range of Motion Tendon Glides Fist Series, Sets: 1 - Reps: 5-10 reps - Sessions: 3-5x/day, Notes: each position, 3 times, then move to the next position, 3 times, and then reach only to the point of pain for the 3rd position, NO PAIN. and you can do this going backwards: deep/long fist, regular fist, hook fist to fingers tall   wrist stays straight  hold 1-2 sec in each position  Exercise Name: Forearm Functional Range of Motion for Pronation/Supination, Sets: 1-2 - Reps: 15-20 - Sessions: 3, Notes: Go Knee to knee:  With your splint on. You  can then try to  roll your forearm on your leg or on a table with out the support of the other hand. Also, touch right knee palm down, then right shoulder, then Left knee palm up then L shoulder  Exercise Name: Forearm Active Range of Motion Combined Pronation and Supination, Sets: 1 - Reps: 10 - Sessions: 3, Notes: Always work toward palm up, even resting in your lap. Turn your palm up and down. You may rest the arm  on a table or in  your lap and roll the arm palm up and down (emphasis on palm up).   Exercise Name: Wrist Active Range of Motion DTM/Dart Throwers Motion with Elbow on Table, Sets: 2 - Reps: 20 - Sessions: 1-2, Notes: Dart throwing motion; move the wrist back and forth, slow deliberate motion. Slow and gentle  Hold a paper ball or a very light foam ball: no gripping, just holding the ball. Move in mid range: about 30% of motion    Next Visit:  Progress ROM and strengthening   See new orders

## 2021-04-02 ENCOUNTER — VIRTUAL VISIT (OUTPATIENT)
Dept: ORTHOPEDICS | Facility: CLINIC | Age: 51
End: 2021-04-02
Payer: COMMERCIAL

## 2021-04-02 DIAGNOSIS — M75.01 ADHESIVE CAPSULITIS OF RIGHT SHOULDER: Primary | ICD-10-CM

## 2021-04-02 PROCEDURE — 99212 OFFICE O/P EST SF 10 MIN: CPT | Mod: 95 | Performed by: FAMILY MEDICINE

## 2021-04-02 NOTE — LETTER
4/2/2021         RE: Iman Alves  [address]  [place] [address]        Dear Colleague,    Thank you for referring your patient, Iman Alves, to the [facility]. Please see a copy of my visit note below.      [facility]  SPORTS & ORTHOPEDIC CLINIC VISIT     Apr 2, 2021        ASSESSMENT & PLAN    50-year-old with adhesive capsulitis of the right shoulder and mild rotator cuff tendinopathy.    Reviewed imaging and assessment with patient in detail  Plan to continue current treatment plan with physical therapy and as needed OTC medications.  She is having persistent pain we could consider a ultrasound-guided glenohumeral injection.  She will contact us if she would like to pursue this option.    Vu Sotelo MD  [facility]OP[facility]IC [facility] [place]    Iman is a 50 year old who is being evaluated via a billable telephone visit.      What phone number would you like to be contacted at?   How would you like to obtain your AVS? MyChart        Phone call duration: 7 minutes  -----  No chief complaint on file.      SUBJECTIVE  Iman Alves is a/an 50 year old female who is seen for follow up of right shoulder pain. Pain is about the same or worse as the last visit. Underwent mri on 3/26/2021 and is anxious to see the results.           REVIEW OF SYSTEMS:    See HPI     OBJECTIVE:  There were no vitals taken for this visit.     No exam.     RADIOLOGY:    Reviewed MRI performed 3/26/2021 at [facility] of the right shoulder.  This demonstrates capsular inflammatory changes consistent with adhesive capsulitis.  Mild tendinopathy of the supraspinatus and infraspinatus tendons.  No significant tearing.                   Again, thank you for allowing me to participate in the care of your patient.        Sincerely,        Vu Sotelo MD

## 2021-04-02 NOTE — LETTER
Date:April 10, 2021      Patient was self referred, no letter generated. Do not send.        [facility] Information

## 2021-04-02 NOTE — PROGRESS NOTES
[facility] AND SURGERY CENTER  SPORTS & ORTHOPEDIC CLINIC VISIT     Apr 2, 2021        ASSESSMENT & PLAN    50-year-old with adhesive capsulitis of the right shoulder and mild rotator cuff tendinopathy.    Reviewed imaging and assessment with patient in detail  Plan to continue current treatment plan with physical therapy and as needed OTC medications.  She is having persistent pain we could consider a ultrasound-guided glenohumeral injection.  She will contact us if she would like to pursue this option.    Vu Sotelo MD  [facility] ORTHOPEDIC [facility] SILVIO Valerio is a 50 year old who is being evaluated via a billable telephone visit.      What phone number would you like to be contacted at?   How would you like to obtain your AVS? [email or web address]        Phone call duration: 7 minutes  -----  No chief complaint on file.      SPENSER Alves is a/an 50 year old female who is seen for follow up of right shoulder pain. Pain is about the same or worse as the last visit. Underwent mri on 3/26/2021 and is anxious to see the results.           REVIEW OF SYSTEMS:    See HPI     OBJECTIVE:  There were no vitals taken for this visit.     No exam.     RADIOLOGY:    Reviewed MRI performed 3/26/2021 at [facility] of the right shoulder.  This demonstrates capsular inflammatory changes consistent with adhesive capsulitis.  Mild tendinopathy of the supraspinatus and infraspinatus tendons.  No significant tearing.

## 2021-04-06 ENCOUNTER — THERAPY VISIT (OUTPATIENT)
Dept: OCCUPATIONAL THERAPY | Facility: CLINIC | Age: 51
End: 2021-04-06
Payer: COMMERCIAL

## 2021-04-06 DIAGNOSIS — M25.531 RIGHT WRIST PAIN: ICD-10-CM

## 2021-04-06 DIAGNOSIS — M25.511 RIGHT SHOULDER PAIN, UNSPECIFIED CHRONICITY: ICD-10-CM

## 2021-04-06 DIAGNOSIS — Z48.89 OTHER SPECIFIED AFTERCARE FOLLOWING SURGERY: ICD-10-CM

## 2021-04-06 DIAGNOSIS — M25.631 WRIST STIFFNESS, RIGHT: Primary | ICD-10-CM

## 2021-04-06 DIAGNOSIS — S52.531D CLOSED COLLES' FRACTURE OF RIGHT RADIUS WITH ROUTINE HEALING: ICD-10-CM

## 2021-04-06 PROCEDURE — 97018 PARAFFIN BATH THERAPY: CPT | Mod: GO | Performed by: OCCUPATIONAL THERAPIST

## 2021-04-06 PROCEDURE — 97140 MANUAL THERAPY 1/> REGIONS: CPT | Mod: GO | Performed by: OCCUPATIONAL THERAPIST

## 2021-04-06 PROCEDURE — 97110 THERAPEUTIC EXERCISES: CPT | Mod: GO | Performed by: OCCUPATIONAL THERAPIST

## 2021-04-06 PROCEDURE — 97112 NEUROMUSCULAR REEDUCATION: CPT | Mod: GO | Performed by: OCCUPATIONAL THERAPIST

## 2021-04-06 NOTE — PROGRESS NOTES
SOAP note objective information for 4/6/2021.  Please refer to the daily flowsheet for treatment today, total treatment time and time spent performing 1:1 timed codes.          Diagnosis:  Right Distal radis fracture  DOI:  1/22/21  DOS:  2/2/21  Procedure:  ORIF  Post: 9w 0days  Referring MD: Bear Alejo MD @ [facility]    Next MD visit: 3/25/21    Subjective:  Iman Alves is a 50 year old right hand dominant female.  S:  Please refer to the daily flowsheet for treatment today, total treatment time and time spent performing 1:1 timed codes.              Patient Health History  Iman Alves being seen for rehabilitation after ORIF/carpal tunnel surgery for broken distal radius and (non-surgical) broken distal ulna.     Problem began: 1/22/2021.   Problem occurred: fall in home   Pain is reported as 4/10 on pain scale.  General health as reported by patient is good.  Pertinent medical history includes: history of fractures, numbness/tingling, pain at night/rest, changes in skin color and weakness.     Medical allergies: none.   Surgeries include:  Orthopedic surgery and other. Other surgery history details: umbilical cyst repair (1972); abdominal muscle repair (2008).    Current medications:  None.    Current occupation is / (self-employed).   Primary job tasks include:  Computer work, driving, prolonged sitting and repetitive tasks.                  Occupational Profile Information:  Problem began: was stepping over the baby/puppy gate and tripped and fell, notes she had a minor concussion after the fall and this was assessed at the ED.    Pior functional level:  no limitations    Barriers include:lives with son, age 13, healing fx left leg  Mobility: No difficulty  Transportation: drives  Leisure activities/hobbies: has a new puppy, walk the puppy, works on the house, was painting and remolding, likes to read  Other: difficulty typing only doing it one handed    Functional  Outcome Measure:   Please refer to flowsheet    Objective:    Pain Level Report:  Pain Level (Scale 0-10):   2/15/2021 3/22    At Rest 3-4 0    With Use 6-7 6-8      Pain Description:  Date 2/15/2021 3/22     right      Location  elbow, forearm, wrist and hand  Shoulder and elbow cubital tunnel area since the surgery has been noted     Pain Quality Aching, Dull, Numb, Sharp, Tender, Tingling and zinging Can be sharp if I reach too quickly or far, but overall better, even with a bump to the hand can be shooting, the shoulder does limit what I can do with the hand, or inadvertent wt bearing as when sleeping, or turning the hand palm up down. Pain subsides after the incident    Frequency intermittent, constant or daily   intermittent    Pain is worst daytime     Exacerbated by Use and typing     Relieved by heat, rest and touching and moving into a better position, OTC as needed     Progression Not better improving      Strength:  [x]   Contraindicated  Edema:  mild of the  hand, thumb, fingers and wrist, improving  Wound/Scar: closed, pink and mildly tender and mildly adhered  Palpation:  []     Normal        [x]   Tender       [x]  Mild         []   Moderate []   Severe   Location: volar forearm       Sensation: IMPROVING Decreased Median Nerve distribution per pt report and since the surgery, pt reported  ROM:  Wrist 2/15/2021 2/22/21 3/1 3/8/21 3/22 3/29    AROM(PROM) Right         Extension 20 25 35 40 35  POST: 45 35 37   Flexion 15 20 45 42 35  POST: 45 35 40   RD          UD          Supination Amended: 20  42  55  42   Pronation Amended: 65  70  70       ROM:    Thumb  2/15/2021 2/22 3/22    AROM(PROM)  Right      MP  0/30      IP  -5/50      RAbd        PAbd  55      Retropulsion        Kapandji Opposition Scale (0-10/10)  5 7 8        ROM:   Fingers 2/15/2021 2/15/2021 3/22   AROM(PROM) Right Flex lag    Index MP -15 2.5 0   PIP -10     DIP      FERRARA      Long MP -20 2.5 0   PIP -15     DIP      FERRARA      Ring  MP -15 2.5 0   PIP -15     DIP      FERRARA      Small MP -10 1.75 0   PIP -10     DIP      FERRARA        ROM  Shoulder 2/22/2021   AROM (PROM) RIGHT   Flexion Supine ~130+   Extension WNL   Internal Rotation WNL   External Rotation 45+   Abduction 110+     Assessment: Pt notes the shoulder is her main limiter to RUE function  Please refer to the daily flowsheet for treatment today, total treatment time and time spent performing 1:1 timed codes.        Plan:  Frequency:  1 X a week , once daily  Duration:  for  8 weeks       Treatment Plan:   Modalities:  Paraffin  Therapeutic Exercise:  AROM, AAROM, PROM, Tendon Gliding, Blocking, Reverse Blocking and Extensor Tracking  Neuromuscular re-education:  Nerve Gliding, Coordination/Dexterity, Sensory re-education, Desensitization and Kinesiotaping  Manual Techniques:  Scar mobilization, Myofascial release and Manual edema mobilization  Self Care:  Ergonomic Considerations  Orthotic Fabrication: Static forearm based orthosis    Discharge Plan:    Achieve all LTG.  Independent in home treatment program.  Reach maximal therapeutic benefit.    Home Program:   Edema management per guidelines 10 minutes on the hour every waking hour  AROM of unaffected joints 3 times per day  2/15/2021  Exercise Name: Thumb Active Range of Motion IP Joint Blocking, Sets: 1 - Reps: 10 - Sessions: 3, Notes: Hold and move the tip, in the splint and out of the splint  Exercise Name: Thumb Active Range of Motion MP Joint Blocking, Sets: 1-2 sets - Reps: 10 reps - Sessions: 3, Notes: hold the base of your thumb and move at the middle  joint of your thumb, not bending the tip joint    Exercise Name: Thumb Active Range of Motion Composite Flexion, Sets: 1  - Reps:  5 - 10reps - Sessions: 3x/day, Notes: Remove the splint, and gently move your thumb in a direction down toward the table, and then gently lift it up. Move only in a pain free range, keeping the thumb close to your palm  Exercise Name: Thumb Active  "Range of Motion Palmar Abduction, Sets: 1 set  - Reps: 5 reps - Sessions: 3x/day, Notes: Roll your thumb out, like making a  big C  Exercise Name: Thumb Active Range of Motion Opposition, Sets: 1-2 - Reps: 5 reps - Sessions: 3-5, Notes:  \"O\" position  to each finger as tolerated, do not push into pain.   Gentle motion within your pain tolerance..  Roll your thumb WAY out in a wide arc from first position to each finger, between each finger tips, lift thumb away with attention to lifting from the middle thumb joint.    Exercise Name: Thumb Active Range of Motion Circumduction, Sets: 1-2   - Reps: 5 - 10x each direction - Sessions: 3, Notes: Keeping the thumb in a Capital C. (Twiddle your thumbs) Make slow circles starting from the bottom or base thumb joint and make small circles, keeping the  metacarpal and proximal phalanx in line. Do not allow the thumb to have a \"broken line\" or deviate from the straight line.   Exercise Name: Finger Active Range of Motion DIP Joint Blocking, Sets: 1-2 - Reps: 5-10 repetitions each set - Sessions: 3-5 , Notes: For your end joint of the your finger   Exercise Name: Finger Active Range of Motion PIP Joint Blocking, Sets: 1-2 - Reps: 5-10 each - Sessions: 3-5, Notes: MIDDLE JOINT MOTION involved fingers  You can do this over the edge of a table, or you can.   Hold all fingers with your other palm, and then lift and straighten the middle joint of all finger each one individually  Exercise Name: Finger Active Range of Motion FDS Flexor Tendon Gliding, Sets: 1 set  - Reps: 3-5 reps each finger - Sessions: 3, Notes: Moving each finger separately. Respect the pain, and do VERY SLOWLY. FOCUS the bending at the middle joints, and DO NOT PUSH into pain  do for all fingers  Exercise Name:  Intrinsic Finger Active Range of Motion Ab and Adduction, Sets: 1 - Reps: 5-10 - Sessions: 3-5, Notes: open all your fingers wide, then close them, focus on the fingers  Exercise Name: Finger Active Range " of Motion Table Top Fist Series - Reps: 10 - Sessions: Every hour, Notes: You can hold a ball or small cylinder in your hand to help your finger to move toward a full fist  Exercise Name: Finger Active Range of Motion Tendon Glides Fist Series, Sets: 1 - Reps: 5-10 reps - Sessions: 3-5x/day, Notes: each position, 3 times, then move to the next position, 3 times, and then reach only to the point of pain for the 3rd position, NO PAIN. and you can do this going backwards: deep/long fist, regular fist, hook fist to fingers tall   wrist stays straight  hold 1-2 sec in each position  Exercise Name: Forearm Functional Range of Motion for Pronation/Supination, Sets: 1-2 - Reps: 15-20 - Sessions: 3, Notes: Go Knee to knee:  With your splint on. You  can then try to  roll your forearm on your leg or on a table with out the support of the other hand. Also, touch right knee palm down, then right shoulder, then Left knee palm up then L shoulder  Exercise Name: Forearm Active Range of Motion Combined Pronation and Supination, Sets: 1 - Reps: 10 - Sessions: 3, Notes: Always work toward palm up, even resting in your lap. Turn your palm up and down. You may rest the arm  on a table or in  your lap and roll the arm palm up and down (emphasis on palm up).   Exercise Name: Wrist Active Range of Motion DTM/Dart Throwers Motion with Elbow on Table, Sets: 2 - Reps: 20 - Sessions: 1-2, Notes: Dart throwing motion; move the wrist back and forth, slow deliberate motion. Slow and gentle  Hold a paper ball or a very light foam ball: no gripping, just holding the ball. Move in mid range: about 30% of motion    Next Visit:  Progress ROM and strengthening   See new orders

## 2021-04-13 ENCOUNTER — THERAPY VISIT (OUTPATIENT)
Dept: OCCUPATIONAL THERAPY | Facility: CLINIC | Age: 51
End: 2021-04-13
Payer: COMMERCIAL

## 2021-04-13 DIAGNOSIS — S52.531D CLOSED COLLES' FRACTURE OF RIGHT RADIUS WITH ROUTINE HEALING: ICD-10-CM

## 2021-04-13 DIAGNOSIS — M25.531 RIGHT WRIST PAIN: ICD-10-CM

## 2021-04-13 DIAGNOSIS — Z48.89 OTHER SPECIFIED AFTERCARE FOLLOWING SURGERY: ICD-10-CM

## 2021-04-13 DIAGNOSIS — M25.631 WRIST STIFFNESS, RIGHT: Primary | ICD-10-CM

## 2021-04-13 DIAGNOSIS — M25.511 RIGHT SHOULDER PAIN, UNSPECIFIED CHRONICITY: ICD-10-CM

## 2021-04-13 PROCEDURE — 97112 NEUROMUSCULAR REEDUCATION: CPT | Mod: GO | Performed by: OCCUPATIONAL THERAPIST

## 2021-04-13 PROCEDURE — 97110 THERAPEUTIC EXERCISES: CPT | Mod: GO | Performed by: OCCUPATIONAL THERAPIST

## 2021-04-13 PROCEDURE — 97140 MANUAL THERAPY 1/> REGIONS: CPT | Mod: GO | Performed by: OCCUPATIONAL THERAPIST

## 2021-04-13 NOTE — PROGRESS NOTES
SOAP note objective information for 4/13/2021.  Please refer to the daily flowsheet for treatment today, total treatment time and time spent performing 1:1 timed codes.          Diagnosis:  Right Distal radis fracture  DOI:  1/22/21  DOS:  2/2/21  Procedure:  ORIF  Post: 9w 0days  Referring MD: Bear Alejo MD @ [facility]    Next MD visit: 3/25/21    Subjective:  Iman Alves is a 50 year old right hand dominant female.  S:  Please refer to the daily flowsheet for treatment today, total treatment time and time spent performing 1:1 timed codes.              Patient Health History  Iman Alves being seen for rehabilitation after ORIF/carpal tunnel surgery for broken distal radius and (non-surgical) broken distal ulna.     Problem began: 1/22/2021.   Problem occurred: fall in home   Pain is reported as 4/10 on pain scale.  General health as reported by patient is good.  Pertinent medical history includes: history of fractures, numbness/tingling, pain at night/rest, changes in skin color and weakness.     Medical allergies: none.   Surgeries include:  Orthopedic surgery and other. Other surgery history details: umbilical cyst repair (1972); abdominal muscle repair (2008).    Current medications:  None.    Current occupation is / (self-employed).   Primary job tasks include:  Computer work, driving, prolonged sitting and repetitive tasks.                  Occupational Profile Information:  Problem began: was stepping over the baby/puppy gate and tripped and fell, notes she had a minor concussion after the fall and this was assessed at the ED.    Pior functional level:  no limitations    Barriers include:lives with son, age 13, healing fx left leg  Mobility: No difficulty  Transportation: drives  Leisure activities/hobbies: has a new puppy, walk the puppy, works on the house, was painting and remolding, likes to read  Other: difficulty typing only doing it one handed    Functional  Outcome Measure:   Please refer to flowsheet    Objective:    Pain Level Report:  Pain Level (Scale 0-10):   2/15/2021 3/22    At Rest 3-4 0    With Use 6-7 6-8      Pain Description:  Date 2/15/2021 3/22     right      Location  elbow, forearm, wrist and hand  Shoulder and elbow cubital tunnel area since the surgery has been noted     Pain Quality Aching, Dull, Numb, Sharp, Tender, Tingling and zinging Can be sharp if I reach too quickly or far, but overall better, even with a bump to the hand can be shooting, the shoulder does limit what I can do with the hand, or inadvertent wt bearing as when sleeping, or turning the hand palm up down. Pain subsides after the incident    Frequency intermittent, constant or daily   intermittent    Pain is worst daytime     Exacerbated by Use and typing     Relieved by heat, rest and touching and moving into a better position, OTC as needed     Progression Not better improving      Strength:  [x]   Contraindicated  Edema:  mild of the  hand, thumb, fingers and wrist, improving  Wound/Scar: closed, pink and mildly tender and mildly adhered  Palpation:  []     Normal        [x]   Tender       [x]  Mild         []   Moderate []   Severe   Location: volar forearm       Sensation: IMPROVING Decreased Median Nerve distribution per pt report and since the surgery, pt reported  ROM:  Wrist 2/15/2021 2/22/21 3/1 3/8/21 3/22 3/29 4/6 4/13   AROM(PROM) Right          Extension 20 25 35 40 35  POST: 45 35 37 38   Flexion 15 20 45 42 35  POST: 45 35 40 47 (55)   RD           UD           Supination Amended: 20  42  55  42 32 with DRUJ  55 w/o DRUJ   Pronation Amended: 65  70  70   65     ROM:    Thumb  2/15/2021 2/22 3/22 4/13   AROM(PROM)  Right      MP  0/30      IP  -5/50      RAbd        PAbd  55      Retropulsion        Kapandji Opposition Scale (0-10/10)  5 7 8 8.5       ROM:   Fingers 2/15/2021 2/15/2021 3/22   AROM(PROM) Right Flex lag    Index MP -15 2.5 0   PIP -10     DIP      FERRARA       Long MP -20 2.5 0   PIP -15     DIP      FERRARA      Ring MP -15 2.5 0   PIP -15     DIP      FERRARA      Small MP -10 1.75 0   PIP -10     DIP      FERRARA        ROM  Shoulder 2/22/2021   AROM (PROM) RIGHT   Flexion Supine ~130+   Extension WNL   Internal Rotation WNL   External Rotation 45+   Abduction 110+     Assessment: Pt notes the shoulder is her main limiter to RUE function  Please refer to the daily flowsheet for treatment today, total treatment time and time spent performing 1:1 timed codes.        Plan:  Frequency:  1 X a week , once daily  Duration:  for  8 weeks       Treatment Plan:   Modalities:  Paraffin  Therapeutic Exercise:  AROM, AAROM, PROM, Tendon Gliding, Blocking, Reverse Blocking and Extensor Tracking  Neuromuscular re-education:  Nerve Gliding, Coordination/Dexterity, Sensory re-education, Desensitization and Kinesiotaping  Manual Techniques:  Scar mobilization, Myofascial release and Manual edema mobilization  Self Care:  Ergonomic Considerations  Orthotic Fabrication: Static forearm based orthosis    Discharge Plan:    Achieve all LTG.  Independent in home treatment program.  Reach maximal therapeutic benefit.    Home Program:   Edema management per guidelines 10 minutes on the hour every waking hour  AROM of unaffected joints 3 times per day  2/15/2021  Exercise Name: Thumb Active Range of Motion IP Joint Blocking, Sets: 1 - Reps: 10 - Sessions: 3, Notes: Hold and move the tip, in the splint and out of the splint  Exercise Name: Thumb Active Range of Motion MP Joint Blocking, Sets: 1-2 sets - Reps: 10 reps - Sessions: 3, Notes: hold the base of your thumb and move at the middle  joint of your thumb, not bending the tip joint    Exercise Name: Thumb Active Range of Motion Composite Flexion, Sets: 1  - Reps:  5 - 10reps - Sessions: 3x/day, Notes: Remove the splint, and gently move your thumb in a direction down toward the table, and then gently lift it up. Move only in a pain free range, keeping  "the thumb close to your palm  Exercise Name: Thumb Active Range of Motion Palmar Abduction, Sets: 1 set  - Reps: 5 reps - Sessions: 3x/day, Notes: Roll your thumb out, like making a  big C  Exercise Name: Thumb Active Range of Motion Opposition, Sets: 1-2 - Reps: 5 reps - Sessions: 3-5, Notes:  \"O\" position  to each finger as tolerated, do not push into pain.   Gentle motion within your pain tolerance..  Roll your thumb WAY out in a wide arc from first position to each finger, between each finger tips, lift thumb away with attention to lifting from the middle thumb joint.    Exercise Name: Thumb Active Range of Motion Circumduction, Sets: 1-2   - Reps: 5 - 10x each direction - Sessions: 3, Notes: Keeping the thumb in a Capital C. (Twiddle your thumbs) Make slow circles starting from the bottom or base thumb joint and make small circles, keeping the  metacarpal and proximal phalanx in line. Do not allow the thumb to have a \"broken line\" or deviate from the straight line.   Exercise Name: Finger Active Range of Motion DIP Joint Blocking, Sets: 1-2 - Reps: 5-10 repetitions each set - Sessions: 3-5 , Notes: For your end joint of the your finger   Exercise Name: Finger Active Range of Motion PIP Joint Blocking, Sets: 1-2 - Reps: 5-10 each - Sessions: 3-5, Notes: MIDDLE JOINT MOTION involved fingers  You can do this over the edge of a table, or you can.   Hold all fingers with your other palm, and then lift and straighten the middle joint of all finger each one individually  Exercise Name: Finger Active Range of Motion FDS Flexor Tendon Gliding, Sets: 1 set  - Reps: 3-5 reps each finger - Sessions: 3, Notes: Moving each finger separately. Respect the pain, and do VERY SLOWLY. FOCUS the bending at the middle joints, and DO NOT PUSH into pain  do for all fingers  Exercise Name:  Intrinsic Finger Active Range of Motion Ab and Adduction, Sets: 1 - Reps: 5-10 - Sessions: 3-5, Notes: open all your fingers wide, then close them, " focus on the fingers  Exercise Name: Finger Active Range of Motion Table Top Fist Series - Reps: 10 - Sessions: Every hour, Notes: You can hold a ball or small cylinder in your hand to help your finger to move toward a full fist  Exercise Name: Finger Active Range of Motion Tendon Glides Fist Series, Sets: 1 - Reps: 5-10 reps - Sessions: 3-5x/day, Notes: each position, 3 times, then move to the next position, 3 times, and then reach only to the point of pain for the 3rd position, NO PAIN. and you can do this going backwards: deep/long fist, regular fist, hook fist to fingers tall   wrist stays straight  hold 1-2 sec in each position  Exercise Name: Forearm Functional Range of Motion for Pronation/Supination, Sets: 1-2 - Reps: 15-20 - Sessions: 3, Notes: Go Knee to knee:  With your splint on. You  can then try to  roll your forearm on your leg or on a table with out the support of the other hand. Also, touch right knee palm down, then right shoulder, then Left knee palm up then L shoulder  Exercise Name: Forearm Active Range of Motion Combined Pronation and Supination, Sets: 1 - Reps: 10 - Sessions: 3, Notes: Always work toward palm up, even resting in your lap. Turn your palm up and down. You may rest the arm  on a table or in  your lap and roll the arm palm up and down (emphasis on palm up).   Exercise Name: Wrist Active Range of Motion DTM/Dart Throwers Motion with Elbow on Table, Sets: 2 - Reps: 20 - Sessions: 1-2, Notes: Dart throwing motion; move the wrist back and forth, slow deliberate motion. Slow and gentle  Hold a paper ball or a very light foam ball: no gripping, just holding the ball. Move in mid range: about 30% of motion  4/13/2021  Putty for coordination and manipulation and strengthening    Next Visit:  Progress ROM and strengthening :: wrist isometrics and isotonics

## 2021-04-17 ENCOUNTER — HEALTH MAINTENANCE LETTER (OUTPATIENT)
Age: 51
End: 2021-04-17

## 2021-04-22 ENCOUNTER — THERAPY VISIT (OUTPATIENT)
Dept: OCCUPATIONAL THERAPY | Facility: CLINIC | Age: 51
End: 2021-04-22
Payer: COMMERCIAL

## 2021-04-22 DIAGNOSIS — M25.511 RIGHT SHOULDER PAIN, UNSPECIFIED CHRONICITY: ICD-10-CM

## 2021-04-22 DIAGNOSIS — Z48.89 OTHER SPECIFIED AFTERCARE FOLLOWING SURGERY: ICD-10-CM

## 2021-04-22 DIAGNOSIS — M25.631 WRIST STIFFNESS, RIGHT: Primary | ICD-10-CM

## 2021-04-22 DIAGNOSIS — M25.531 RIGHT WRIST PAIN: ICD-10-CM

## 2021-04-22 DIAGNOSIS — S52.531D CLOSED COLLES' FRACTURE OF RIGHT RADIUS WITH ROUTINE HEALING: ICD-10-CM

## 2021-04-22 PROCEDURE — 97018 PARAFFIN BATH THERAPY: CPT | Mod: GO | Performed by: OCCUPATIONAL THERAPIST

## 2021-04-22 PROCEDURE — 97112 NEUROMUSCULAR REEDUCATION: CPT | Mod: GO | Performed by: OCCUPATIONAL THERAPIST

## 2021-04-22 PROCEDURE — 97140 MANUAL THERAPY 1/> REGIONS: CPT | Mod: GO | Performed by: OCCUPATIONAL THERAPIST

## 2021-04-22 PROCEDURE — 97110 THERAPEUTIC EXERCISES: CPT | Mod: GO | Performed by: OCCUPATIONAL THERAPIST

## 2021-04-22 NOTE — PROGRESS NOTES
SOAP note objective information for 4/22/2021.  Please refer to the daily flowsheet for treatment today, total treatment time and time spent performing 1:1 timed codes.          Diagnosis:  Right Distal radis fracture  DOI:  1/22/21  DOS:  2/2/21  Procedure:  ORIF  Post: 9w 0days  Referring MD: Bear Alejo MD @ [facility]    Next MD visit: 5/6/21    Subjective:  Iman Alves is a 50 year old right hand dominant female.  S:  Please refer to the daily flowsheet for treatment today, total treatment time and time spent performing 1:1 timed codes.              Patient Health History  Iman Alves being seen for rehabilitation after ORIF/carpal tunnel surgery for broken distal radius and (non-surgical) broken distal ulna.     Problem began: 1/22/2021.   Problem occurred: fall in home   Pain is reported as 4/10 on pain scale.  General health as reported by patient is good.  Pertinent medical history includes: history of fractures, numbness/tingling, pain at night/rest, changes in skin color and weakness.     Medical allergies: none.   Surgeries include:  Orthopedic surgery and other. Other surgery history details: umbilical cyst repair (1972); abdominal muscle repair (2008).    Current medications:  None.    Current occupation is / (self-employed).   Primary job tasks include:  Computer work, driving, prolonged sitting and repetitive tasks.                  Occupational Profile Information:  Problem began: was stepping over the baby/puppy gate and tripped and fell, notes she had a minor concussion after the fall and this was assessed at the ED.    Pior functional level:  no limitations    Barriers include:lives with son, age 13, healing fx left leg  Mobility: No difficulty  Transportation: drives  Leisure activities/hobbies: has a new puppy, walk the puppy, works on the house, was painting and remolding, likes to read  Other: difficulty typing only doing it one handed    Functional  Outcome Measure:   Please refer to flowsheet    Objective:    Pain Level Report:  Pain Level (Scale 0-10):   2/15/2021 3/22 4/22   At Rest 3-4 0    With Use 6-7 6-8 5     Pain Description:  Date 2/15/2021 3/22 4/22    right      Location  elbow, forearm, wrist and hand  Shoulder and elbow cubital tunnel area since the surgery has been noted     Pain Quality Aching, Dull, Numb, Sharp, Tender, Tingling and zinging Can be sharp if I reach too quickly or far, but overall better, even with a bump to the hand can be shooting, the shoulder does limit what I can do with the hand, or inadvertent wt bearing as when sleeping, or turning the hand palm up down. Pain subsides after the incident Much less often, and numbness in palm is lessening   Frequency intermittent, constant or daily   intermittent    Pain is worst daytime     Exacerbated by Use and typing     Relieved by heat, rest and touching and moving into a better position, OTC as needed     Progression Not better improving improving     Strength:  [x]   Contraindicated  Edema:  mild of the  hand, thumb, fingers and wrist, improving  Wound/Scar: closed, pink and mildly tender and mildly adhered  Palpation:  []     Normal        [x]   Tender       [x]  Mild         []   Moderate []   Severe   Location: volar forearm       Sensation: IMPROVING Decreased Median Nerve distribution per pt report and since the surgery, pt reported  ROM:  Wrist 2/15/2021 2/22/21 3/22 3/29 4/6 4/13    AROM(PROM) Right         Extension 20 25 35  POST: 45 35 37 38 45   Flexion 15 20 35  POST: 45 35 40 47 (55) 54   RD          UD          Supination Amended: 20  55  42 32 with DRUJ  55 w/o DRUJ    Pronation Amended: 65  70   65      ROM:    Thumb  2/15/2021 2/22 3/22 4/13   AROM(PROM)  Right      MP  0/30      IP  -5/50      RAbd        PAbd  55      Retropulsion        Kapandji Opposition Scale (0-10/10)  5 7 8 8.5       ROM:   Fingers 2/15/2021 2/15/2021 3/22   AROM(PROM) Right Flex lag     Index MP -15 2.5 0   PIP -10     DIP      FERRARA      Long MP -20 2.5 0   PIP -15     DIP      FERRARA      Ring MP -15 2.5 0   PIP -15     DIP      FERRARA      Small MP -10 1.75 0   PIP -10     DIP      FERRARA        ROM  Shoulder 2/22/2021   AROM (PROM) RIGHT   Flexion Supine ~130+   Extension WNL   Internal Rotation WNL   External Rotation 45+   Abduction 110+     Assessment: Pt notes the shoulder is her main limiter to RUE function  Please refer to the daily flowsheet for treatment today, total treatment time and time spent performing 1:1 timed codes.        Plan:  Frequency:  1 X a week , once daily  Duration:  for  8 weeks       Treatment Plan:   Modalities:  Paraffin  Therapeutic Exercise:  AROM, AAROM, PROM, Tendon Gliding, Blocking, Reverse Blocking and Extensor Tracking  Neuromuscular re-education:  Nerve Gliding, Coordination/Dexterity, Sensory re-education, Desensitization and Kinesiotaping  Manual Techniques:  Scar mobilization, Myofascial release and Manual edema mobilization  Self Care:  Ergonomic Considerations  Orthotic Fabrication: Static forearm based orthosis    Discharge Plan:    Achieve all LTG.  Independent in home treatment program.  Reach maximal therapeutic benefit.    Home Program:   Edema management per guidelines 10 minutes on the hour every waking hour  AROM of unaffected joints 3 times per day  2/15/2021  Exercise Name: Thumb Active Range of Motion IP Joint Blocking, Sets: 1 - Reps: 10 - Sessions: 3, Notes: Hold and move the tip, in the splint and out of the splint  Exercise Name: Thumb Active Range of Motion MP Joint Blocking, Sets: 1-2 sets - Reps: 10 reps - Sessions: 3, Notes: hold the base of your thumb and move at the middle  joint of your thumb, not bending the tip joint    Exercise Name: Thumb Active Range of Motion Composite Flexion, Sets: 1  - Reps:  5 - 10reps - Sessions: 3x/day, Notes: Remove the splint, and gently move your thumb in a direction down toward the table, and then gently  "lift it up. Move only in a pain free range, keeping the thumb close to your palm  Exercise Name: Thumb Active Range of Motion Palmar Abduction, Sets: 1 set  - Reps: 5 reps - Sessions: 3x/day, Notes: Roll your thumb out, like making a  big C  Exercise Name: Thumb Active Range of Motion Opposition, Sets: 1-2 - Reps: 5 reps - Sessions: 3-5, Notes:  \"O\" position  to each finger as tolerated, do not push into pain.   Gentle motion within your pain tolerance..  Roll your thumb WAY out in a wide arc from first position to each finger, between each finger tips, lift thumb away with attention to lifting from the middle thumb joint.    Exercise Name: Thumb Active Range of Motion Circumduction, Sets: 1-2   - Reps: 5 - 10x each direction - Sessions: 3, Notes: Keeping the thumb in a Capital C. (Twiddle your thumbs) Make slow circles starting from the bottom or base thumb joint and make small circles, keeping the  metacarpal and proximal phalanx in line. Do not allow the thumb to have a \"broken line\" or deviate from the straight line.   Exercise Name: Finger Active Range of Motion DIP Joint Blocking, Sets: 1-2 - Reps: 5-10 repetitions each set - Sessions: 3-5 , Notes: For your end joint of the your finger   Exercise Name: Finger Active Range of Motion PIP Joint Blocking, Sets: 1-2 - Reps: 5-10 each - Sessions: 3-5, Notes: MIDDLE JOINT MOTION involved fingers  You can do this over the edge of a table, or you can.   Hold all fingers with your other palm, and then lift and straighten the middle joint of all finger each one individually  Exercise Name: Finger Active Range of Motion FDS Flexor Tendon Gliding, Sets: 1 set  - Reps: 3-5 reps each finger - Sessions: 3, Notes: Moving each finger separately. Respect the pain, and do VERY SLOWLY. FOCUS the bending at the middle joints, and DO NOT PUSH into pain  do for all fingers  Exercise Name:  Intrinsic Finger Active Range of Motion Ab and Adduction, Sets: 1 - Reps: 5-10 - Sessions: 3-5, " Notes: open all your fingers wide, then close them, focus on the fingers  Exercise Name: Finger Active Range of Motion Table Top Fist Series - Reps: 10 - Sessions: Every hour, Notes: You can hold a ball or small cylinder in your hand to help your finger to move toward a full fist  Exercise Name: Finger Active Range of Motion Tendon Glides Fist Series, Sets: 1 - Reps: 5-10 reps - Sessions: 3-5x/day, Notes: each position, 3 times, then move to the next position, 3 times, and then reach only to the point of pain for the 3rd position, NO PAIN. and you can do this going backwards: deep/long fist, regular fist, hook fist to fingers tall   wrist stays straight  hold 1-2 sec in each position  Exercise Name: Forearm Functional Range of Motion for Pronation/Supination, Sets: 1-2 - Reps: 15-20 - Sessions: 3, Notes: Go Knee to knee:  With your splint on. You  can then try to  roll your forearm on your leg or on a table with out the support of the other hand. Also, touch right knee palm down, then right shoulder, then Left knee palm up then L shoulder  Exercise Name: Forearm Active Range of Motion Combined Pronation and Supination, Sets: 1 - Reps: 10 - Sessions: 3, Notes: Always work toward palm up, even resting in your lap. Turn your palm up and down. You may rest the arm  on a table or in  your lap and roll the arm palm up and down (emphasis on palm up).   Exercise Name: Wrist Active Range of Motion DTM/Dart Throwers Motion with Elbow on Table, Sets: 2 - Reps: 20 - Sessions: 1-2, Notes: Dart throwing motion; move the wrist back and forth, slow deliberate motion. Slow and gentle  Hold a paper ball or a very light foam ball: no gripping, just holding the ball. Move in mid range: about 30% of motion  4/13/2021  Putty for coordination and manipulation and strengthening    Next Visit:  Progress ROM and strengthening :: wrist isometrics and isotonics

## 2021-04-29 ENCOUNTER — THERAPY VISIT (OUTPATIENT)
Dept: OCCUPATIONAL THERAPY | Facility: CLINIC | Age: 51
End: 2021-04-29
Payer: COMMERCIAL

## 2021-04-29 DIAGNOSIS — Z48.89 OTHER SPECIFIED AFTERCARE FOLLOWING SURGERY: ICD-10-CM

## 2021-04-29 DIAGNOSIS — S52.531D CLOSED COLLES' FRACTURE OF RIGHT RADIUS WITH ROUTINE HEALING: ICD-10-CM

## 2021-04-29 DIAGNOSIS — M25.631 WRIST STIFFNESS, RIGHT: Primary | ICD-10-CM

## 2021-04-29 DIAGNOSIS — M25.531 RIGHT WRIST PAIN: ICD-10-CM

## 2021-04-29 DIAGNOSIS — M25.511 RIGHT SHOULDER PAIN, UNSPECIFIED CHRONICITY: ICD-10-CM

## 2021-04-29 PROCEDURE — 97140 MANUAL THERAPY 1/> REGIONS: CPT | Mod: GO | Performed by: OCCUPATIONAL THERAPIST

## 2021-04-29 PROCEDURE — 97110 THERAPEUTIC EXERCISES: CPT | Mod: GO | Performed by: OCCUPATIONAL THERAPIST

## 2021-04-29 PROCEDURE — 97112 NEUROMUSCULAR REEDUCATION: CPT | Mod: GO | Performed by: OCCUPATIONAL THERAPIST

## 2021-04-29 NOTE — PROGRESS NOTES
SOAP note objective information for 4/29/2021.  Please refer to the daily flowsheet for treatment today, total treatment time and time spent performing 1:1 timed codes.          Diagnosis:  Right Distal radis fracture  DOI:  1/22/21  DOS:  2/2/21  Procedure:  ORIF  Post: 9w 0days  Referring MD: Bear Alejo MD @ [facility]    Next MD visit: 5/6/21    Subjective:  Iman Alves is a 50 year old right hand dominant female.  S:  Please refer to the daily flowsheet for treatment today, total treatment time and time spent performing 1:1 timed codes.              Patient Health History  Iman Alves being seen for rehabilitation after ORIF/carpal tunnel surgery for broken distal radius and (non-surgical) broken distal ulna.     Problem began: 1/22/2021.   Problem occurred: fall in home   Pain is reported as 4/10 on pain scale.  General health as reported by patient is good.  Pertinent medical history includes: history of fractures, numbness/tingling, pain at night/rest, changes in skin color and weakness.     Medical allergies: none.   Surgeries include:  Orthopedic surgery and other. Other surgery history details: umbilical cyst repair (1972); abdominal muscle repair (2008).    Current medications:  None.    Current occupation is / (self-employed).   Primary job tasks include:  Computer work, driving, prolonged sitting and repetitive tasks.                  Occupational Profile Information:  Problem began: was stepping over the baby/puppy gate and tripped and fell, notes she had a minor concussion after the fall and this was assessed at the ED.    Pior functional level:  no limitations    Barriers include:lives with son, age 13, healing fx left leg  Mobility: No difficulty  Transportation: drives  Leisure activities/hobbies: has a new puppy, walk the puppy, works on the house, was painting and remolding, likes to read  Other: difficulty typing only doing it one handed    Functional  Outcome Measure:   Please refer to flowsheet    Objective:    Pain Level Report:  Pain Level (Scale 0-10):   2/15/2021 3/22 4/22   At Rest 3-4 0    With Use 6-7 6-8 5     Pain Description:  Date 2/15/2021 3/22 4/22    right      Location  elbow, forearm, wrist and hand  Shoulder and elbow cubital tunnel area since the surgery has been noted     Pain Quality Aching, Dull, Numb, Sharp, Tender, Tingling and zinging Can be sharp if I reach too quickly or far, but overall better, even with a bump to the hand can be shooting, the shoulder does limit what I can do with the hand, or inadvertent wt bearing as when sleeping, or turning the hand palm up down. Pain subsides after the incident Much less often, and numbness in palm is lessening   Frequency intermittent, constant or daily   intermittent    Pain is worst daytime     Exacerbated by Use and typing     Relieved by heat, rest and touching and moving into a better position, OTC as needed     Progression Not better improving improving     Strength:  [x]   Contraindicated  Edema:  mild of the  hand, thumb, fingers and wrist, improving  Wound/Scar: closed, pink and mildly tender and mildly adhered  Palpation:  []     Normal        [x]   Tender       [x]  Mild         []   Moderate []   Severe   Location: volar forearm       Sensation: IMPROVING Decreased Median Nerve distribution per pt report and since the surgery, pt reported  ROM:  Wrist 2/15/2021 2/22/21 3/22 3/29 4/6 4/13 4/22 4/29   AROM(PROM) Right          Extension 20 25 35  POST: 45 35 37 38 45 41   Flexion 15 20 35  POST: 45 35 40 47 (55) 54 47   RD           UD           Supination Amended: 20  55  42 32 with DRUJ  55 w/o DRUJ  42   Pronation Amended: 65  70   65  65     ROM:    Thumb  2/15/2021 2/22 3/22 4/13   AROM(PROM)  Right      MP  0/30      IP  -5/50      RAbd        PAbd  55      Retropulsion        Kapandji Opposition Scale (0-10/10)  5 7 8 8.5       ROM:   Fingers 2/15/2021 2/15/2021 3/22    AROM(PROM) Right Flex lag    Index MP -15 2.5 0   PIP -10     DIP      FERRARA      Long MP -20 2.5 0   PIP -15     DIP      FERRARA      Ring MP -15 2.5 0   PIP -15     DIP      FERRARA      Small MP -10 1.75 0   PIP -10     DIP      FERRARA        ROM  Shoulder 2/22/2021   AROM (PROM) RIGHT   Flexion Supine ~130+   Extension WNL   Internal Rotation WNL   External Rotation 45+   Abduction 110+     Assessment: Pt notes the shoulder is her main limiter to RUE function  Please refer to the daily flowsheet for treatment today, total treatment time and time spent performing 1:1 timed codes.        Plan:  Frequency:  1 X a week , once daily  Duration:  for  8 weeks       Treatment Plan:   Modalities:  Paraffin  Therapeutic Exercise:  AROM, AAROM, PROM, Tendon Gliding, Blocking, Reverse Blocking and Extensor Tracking  Neuromuscular re-education:  Nerve Gliding, Coordination/Dexterity, Sensory re-education, Desensitization and Kinesiotaping  Manual Techniques:  Scar mobilization, Myofascial release and Manual edema mobilization  Self Care:  Ergonomic Considerations  Orthotic Fabrication: Static forearm based orthosis    Discharge Plan:    Achieve all LTG.  Independent in home treatment program.  Reach maximal therapeutic benefit.    Home Program:   Edema management per guidelines 10 minutes on the hour every waking hour  AROM of unaffected joints 3 times per day  2/15/2021  Exercise Name: Thumb Active Range of Motion IP Joint Blocking, Sets: 1 - Reps: 10 - Sessions: 3, Notes: Hold and move the tip, in the splint and out of the splint  Exercise Name: Thumb Active Range of Motion MP Joint Blocking, Sets: 1-2 sets - Reps: 10 reps - Sessions: 3, Notes: hold the base of your thumb and move at the middle  joint of your thumb, not bending the tip joint    Exercise Name: Thumb Active Range of Motion Composite Flexion, Sets: 1  - Reps:  5 - 10reps - Sessions: 3x/day, Notes: Remove the splint, and gently move your thumb in a direction down toward  "the table, and then gently lift it up. Move only in a pain free range, keeping the thumb close to your palm  Exercise Name: Thumb Active Range of Motion Palmar Abduction, Sets: 1 set  - Reps: 5 reps - Sessions: 3x/day, Notes: Roll your thumb out, like making a  big C  Exercise Name: Thumb Active Range of Motion Opposition, Sets: 1-2 - Reps: 5 reps - Sessions: 3-5, Notes:  \"O\" position  to each finger as tolerated, do not push into pain.   Gentle motion within your pain tolerance..  Roll your thumb WAY out in a wide arc from first position to each finger, between each finger tips, lift thumb away with attention to lifting from the middle thumb joint.    Exercise Name: Thumb Active Range of Motion Circumduction, Sets: 1-2   - Reps: 5 - 10x each direction - Sessions: 3, Notes: Keeping the thumb in a Capital C. (Twiddle your thumbs) Make slow circles starting from the bottom or base thumb joint and make small circles, keeping the  metacarpal and proximal phalanx in line. Do not allow the thumb to have a \"broken line\" or deviate from the straight line.   Exercise Name: Finger Active Range of Motion DIP Joint Blocking, Sets: 1-2 - Reps: 5-10 repetitions each set - Sessions: 3-5 , Notes: For your end joint of the your finger   Exercise Name: Finger Active Range of Motion PIP Joint Blocking, Sets: 1-2 - Reps: 5-10 each - Sessions: 3-5, Notes: MIDDLE JOINT MOTION involved fingers  You can do this over the edge of a table, or you can.   Hold all fingers with your other palm, and then lift and straighten the middle joint of all finger each one individually  Exercise Name: Finger Active Range of Motion FDS Flexor Tendon Gliding, Sets: 1 set  - Reps: 3-5 reps each finger - Sessions: 3, Notes: Moving each finger separately. Respect the pain, and do VERY SLOWLY. FOCUS the bending at the middle joints, and DO NOT PUSH into pain  do for all fingers  Exercise Name:  Intrinsic Finger Active Range of Motion Ab and Adduction, Sets: 1 - " Reps: 5-10 - Sessions: 3-5, Notes: open all your fingers wide, then close them, focus on the fingers  Exercise Name: Finger Active Range of Motion Table Top Fist Series - Reps: 10 - Sessions: Every hour, Notes: You can hold a ball or small cylinder in your hand to help your finger to move toward a full fist  Exercise Name: Finger Active Range of Motion Tendon Glides Fist Series, Sets: 1 - Reps: 5-10 reps - Sessions: 3-5x/day, Notes: each position, 3 times, then move to the next position, 3 times, and then reach only to the point of pain for the 3rd position, NO PAIN. and you can do this going backwards: deep/long fist, regular fist, hook fist to fingers tall   wrist stays straight  hold 1-2 sec in each position  Exercise Name: Forearm Functional Range of Motion for Pronation/Supination, Sets: 1-2 - Reps: 15-20 - Sessions: 3, Notes: Go Knee to knee:  With your splint on. You  can then try to  roll your forearm on your leg or on a table with out the support of the other hand. Also, touch right knee palm down, then right shoulder, then Left knee palm up then L shoulder  Exercise Name: Forearm Active Range of Motion Combined Pronation and Supination, Sets: 1 - Reps: 10 - Sessions: 3, Notes: Always work toward palm up, even resting in your lap. Turn your palm up and down. You may rest the arm  on a table or in  your lap and roll the arm palm up and down (emphasis on palm up).   Exercise Name: Wrist Active Range of Motion DTM/Dart Throwers Motion with Elbow on Table, Sets: 2 - Reps: 20 - Sessions: 1-2, Notes: Dart throwing motion; move the wrist back and forth, slow deliberate motion. Slow and gentle  Hold a paper ball or a very light foam ball: no gripping, just holding the ball. Move in mid range: about 30% of motion  4/13/2021  Putty for coordination and manipulation and strengthening    Next Visit:  Progress ROM and strengthening :: wrist isometrics and isotonics

## 2021-05-05 NOTE — PROGRESS NOTES
"Hand Therapy Progress Note  DATE: 5/6/21  Reporting period: 3/18/21 to current date    Diagnosis:  Right Distal radis fracture  DOI:  1/22/21  DOS:  2/2/21  Procedure:  ORIF  Post: 3 months  Referring MD: Bear Alejo MD @ [facility]    Next MD visit: 5/6/21    Subjective:  Iman Alves is a 50 year old right hand dominant female.    S:  Subjective changes as noted by patient: still having difficulty turning hand over, change will fall out of my hand when paying at a store. Moving to palm up feels like it is stuck, I just can't move it. Eating is getting a bit better, using mostly overhand . Was better some after last visit but then seems to be difficult again using the \"normal\" way to hold utensils. Was able to make dinner w/o brace at all, so I am feeling stronger.    Functional changes noted by patient: Improvement in Self Care Tasks (dressing, bathing) and Household Chores  Decreased Performance in Self Care Tasks (eating)  Response to previous treatment:  good and fair  Patient has noted adverse reaction to:   None      Occupational Profile Information:  Current occupation is / (self-employed).   Primary job tasks include:  Computer work, driving, prolonged sitting and repetitive tasks.  Problem began: was stepping over the baby/puppy gate and tripped and fell, notes she had a minor concussion after the fall and this was assessed at the ED.  Pior functional level:  no limitations    Barriers include:lives with son, age 13, healing fx left leg  Mobility: No difficulty  Transportation: drives  Leisure activities/hobbies: has a new puppy, walk the puppy, works on the house, was painting and remolding, likes to read  Other: difficulty typing only doing it one handed    Functional Outcome Measure:   Please refer to flowsheet    Objective:    Pain Level Report:  Pain Level (Scale 0-10):   2/15/2021 3/22 4/22 5/6   At Rest 3-4 0  0   With Use 6-7 6-8 5 6-7     Pain " "Description:  Date 2/15/2021 3/22 4/22 5/6    right       Location  elbow, forearm, wrist and hand  Shoulder and elbow cubital tunnel area since the surgery has been noted      Pain Quality Aching, Dull, Numb, Sharp, Tender, Tingling and zinging Can be sharp if I reach too quickly or far, but overall better, even with a bump to the hand can be shooting, the shoulder does limit what I can do with the hand, or inadvertent wt bearing as when sleeping, or turning the hand palm up down. Pain subsides after the incident Much less often, and numbness in palm is lessening Pain is more on dorsal wrist middle and going into palm up it feels like \"cement' does not move.   Frequency intermittent, constant or daily   intermittent     Pain is worst daytime      Exacerbated by Use and typing      Relieved by heat, rest and touching and moving into a better position, OTC as needed      Progression Not better improving improving Some worse     Edema:  resolved  Wound/Scar: closed,light pink  Palpation:  []     Normal        [x]   Tender       [x]  Mild         []   Moderate []   Severe   Location: volar forearm  And mid palm   Sensation: IMPROVING Decreased Median Nerve distribution per pt report , reporting finger tips have good sensation, tender in the mid palm area.  ROM:  Wrist 2/15/2021 2/22/21 3/22 3/29 4/6 4/13 4/22 4/29 5/6/21   AROM(PROM) Right           Extension 20 25 35  POST: 45 35 37 38 45 41 50   Flexion 15 20 35  POST: 45 35 40 47 (55) 54 47 50   RD            UD            Supination Amended: 20  55  42 32 with DRUJ  55 w/o DRUJ  42 48   Pronation Amended: 65  70   65  65      ROM:    Thumb  2/15/2021 2/22 3/22 4/13 5/6   AROM(PROM)  Right       MP  0/30       IP  -5/50       RAbd         PAbd  55       Retropulsion         Kapandji Opposition Scale (0-10/10)  5 7 8 8.5        ROM:   Fingers 2/15/2021 2/15/2021 3/22   AROM(PROM) Right Flex lag Flexion lag   Index MP -15 2.5 0   PIP -10     DIP      FERRARA      Long MP " -20 2.5 0   PIP -15     DIP      FERRARA      Ring MP -15 2.5 0   PIP -15     DIP      FERRARA      Small MP -10 1.75 0   PIP -10     DIP      FERRARA        Strength   (Measured in pounds)  Pain Report:  + mild    ++ moderate    +++ severe    5/6/2021 5/6/2021   Trials Right* Left   1  2  3 7  10  10 33  30  34   Average 9 32     Lat Pinch 5/6/2021 5/6/2021   Trials Right Left   1  2  3 5  6  6   11  11  10   Average 6 11     3 Pt Pinch 5/6/2021 5/6/2021   Trials Right Left   1  2  3 3  4  4   9  8  8   Average 4 8       Assessment:  Response to therapy has been improvement to:  ROM of Wrist:  All Planes, altho very slow  Thumb:  All Planes  Fingers: All Planes  Pain:  intensity of pain has increased  Self Care Skills:  Can use hand in limited ways; shoulder and wrist are limited  Response to therapy has been lack of progress in:  Self Care Skills:  Getting hand to mouth and turning palm up for functional tasks    Overall Assessment:  Patient would benefit from continued therapy to achieve rehab potential  STG/LTG:  STGoals have been reviewed and no progress has been made;  see goal sheet for details and changes.  I have re-evaluated this patient and find that the nature, scope, duration and intensity of the therapy is appropriate for the medical condition of the patient.        Plan:  P:  Frequency/Duration:  Recommend continuing with the current treatment plan. 1 X week, once daily  for 8 weeks    Recommendations for Continued Therapy    Treatment Plan:   Modalities:  Paraffin  Therapeutic Exercise:  AROM, AAROM, PROM, Tendon Gliding, Blocking, Reverse Blocking and Extensor Tracking  Neuromuscular re-education:  Nerve Gliding, Coordination/Dexterity, Sensory re-education, Desensitization and Kinesiotaping  Manual Techniques:  Scar mobilization, Myofascial release and Manual edema mobilization  Self Care:  Ergonomic Considerations  Orthotic Fabrication: Static forearm based orthosis    Discharge Plan:    Achieve all  "LTG.  Independent in home treatment program.  Reach maximal therapeutic benefit.    Home Program:   Edema management per guidelines 10 minutes on the hour every waking hour  AROM of unaffected joints 3 times per day  2/15/2021  Exercise Name: Thumb Active Range of Motion IP Joint Blocking, Sets: 1 - Reps: 10 - Sessions: 3, Notes: Hold and move the tip, in the splint and out of the splint  Exercise Name: Thumb Active Range of Motion MP Joint Blocking, Sets: 1-2 sets - Reps: 10 reps - Sessions: 3, Notes: hold the base of your thumb and move at the middle  joint of your thumb, not bending the tip joint    Exercise Name: Thumb Active Range of Motion Composite Flexion, Sets: 1  - Reps:  5 - 10reps - Sessions: 3x/day, Notes: Remove the splint, and gently move your thumb in a direction down toward the table, and then gently lift it up. Move only in a pain free range, keeping the thumb close to your palm  Exercise Name: Thumb Active Range of Motion Palmar Abduction, Sets: 1 set  - Reps: 5 reps - Sessions: 3x/day, Notes: Roll your thumb out, like making a  big C  Exercise Name: Thumb Active Range of Motion Opposition, Sets: 1-2 - Reps: 5 reps - Sessions: 3-5, Notes:  \"O\" position  to each finger as tolerated, do not push into pain.   Gentle motion within your pain tolerance..  Roll your thumb WAY out in a wide arc from first position to each finger, between each finger tips, lift thumb away with attention to lifting from the middle thumb joint.    Exercise Name: Thumb Active Range of Motion Circumduction, Sets: 1-2   - Reps: 5 - 10x each direction - Sessions: 3, Notes: Keeping the thumb in a Capital C. (Twiddle your thumbs) Make slow circles starting from the bottom or base thumb joint and make small circles, keeping the  metacarpal and proximal phalanx in line. Do not allow the thumb to have a \"broken line\" or deviate from the straight line.   Exercise Name: Finger Active Range of Motion DIP Joint Blocking, Sets: 1-2 - " Reps: 5-10 repetitions each set - Sessions: 3-5 , Notes: For your end joint of the your finger   Exercise Name: Finger Active Range of Motion PIP Joint Blocking, Sets: 1-2 - Reps: 5-10 each - Sessions: 3-5, Notes: MIDDLE JOINT MOTION involved fingers  You can do this over the edge of a table, or you can.   Hold all fingers with your other palm, and then lift and straighten the middle joint of all finger each one individually  Exercise Name: Finger Active Range of Motion FDS Flexor Tendon Gliding, Sets: 1 set  - Reps: 3-5 reps each finger - Sessions: 3, Notes: Moving each finger separately. Respect the pain, and do VERY SLOWLY. FOCUS the bending at the middle joints, and DO NOT PUSH into pain  do for all fingers  Exercise Name:  Intrinsic Finger Active Range of Motion Ab and Adduction, Sets: 1 - Reps: 5-10 - Sessions: 3-5, Notes: open all your fingers wide, then close them, focus on the fingers  Exercise Name: Finger Active Range of Motion Table Top Fist Series - Reps: 10 - Sessions: Every hour, Notes: You can hold a ball or small cylinder in your hand to help your finger to move toward a full fist  Exercise Name: Finger Active Range of Motion Tendon Glides Fist Series, Sets: 1 - Reps: 5-10 reps - Sessions: 3-5x/day, Notes: each position, 3 times, then move to the next position, 3 times, and then reach only to the point of pain for the 3rd position, NO PAIN. and you can do this going backwards: deep/long fist, regular fist, hook fist to fingers tall   wrist stays straight  hold 1-2 sec in each position  Exercise Name: Forearm Functional Range of Motion for Pronation/Supination, Sets: 1-2 - Reps: 15-20 - Sessions: 3, Notes: Go Knee to knee:  With your splint on. You  can then try to  roll your forearm on your leg or on a table with out the support of the other hand. Also, touch right knee palm down, then right shoulder, then Left knee palm up then L shoulder  Exercise Name: Forearm Active Range of Motion Combined  Pronation and Supination, Sets: 1 - Reps: 10 - Sessions: 3, Notes: Always work toward palm up, even resting in your lap. Turn your palm up and down. You may rest the arm  on a table or in  your lap and roll the arm palm up and down (emphasis on palm up).   Exercise Name: Wrist Active Range of Motion DTM/Dart Throwers Motion with Elbow on Table, Sets: 2 - Reps: 20 - Sessions: 1-2, Notes: Dart throwing motion; move the wrist back and forth, slow deliberate motion. Slow and gentle  Hold a paper ball or a very light foam ball: no gripping, just holding the ball. Move in mid range: about 30% of motion  4/13/2021  Putty for coordination and manipulation and strengthening    Next Visit:  Progress ROM and strengthening :: wrist isometrics and isotonics

## 2021-05-06 ENCOUNTER — TRANSFERRED RECORDS (OUTPATIENT)
Dept: HEALTH INFORMATION MANAGEMENT | Facility: CLINIC | Age: 51
End: 2021-05-06

## 2021-05-06 ENCOUNTER — THERAPY VISIT (OUTPATIENT)
Dept: OCCUPATIONAL THERAPY | Facility: CLINIC | Age: 51
End: 2021-05-06
Payer: COMMERCIAL

## 2021-05-06 DIAGNOSIS — M25.631 WRIST STIFFNESS, RIGHT: ICD-10-CM

## 2021-05-06 DIAGNOSIS — M25.511 RIGHT SHOULDER PAIN, UNSPECIFIED CHRONICITY: ICD-10-CM

## 2021-05-06 DIAGNOSIS — Z48.89 OTHER SPECIFIED AFTERCARE FOLLOWING SURGERY: ICD-10-CM

## 2021-05-06 DIAGNOSIS — S52.531D CLOSED COLLES' FRACTURE OF RIGHT RADIUS WITH ROUTINE HEALING: ICD-10-CM

## 2021-05-06 DIAGNOSIS — M25.531 RIGHT WRIST PAIN: Primary | ICD-10-CM

## 2021-05-06 PROCEDURE — 97112 NEUROMUSCULAR REEDUCATION: CPT | Mod: GO | Performed by: OCCUPATIONAL THERAPIST

## 2021-05-06 PROCEDURE — 97110 THERAPEUTIC EXERCISES: CPT | Mod: GO | Performed by: OCCUPATIONAL THERAPIST

## 2021-05-06 PROCEDURE — 97140 MANUAL THERAPY 1/> REGIONS: CPT | Mod: GO | Performed by: OCCUPATIONAL THERAPIST

## 2021-05-13 ENCOUNTER — THERAPY VISIT (OUTPATIENT)
Dept: OCCUPATIONAL THERAPY | Facility: CLINIC | Age: 51
End: 2021-05-13
Payer: COMMERCIAL

## 2021-05-13 DIAGNOSIS — M25.511 RIGHT SHOULDER PAIN, UNSPECIFIED CHRONICITY: ICD-10-CM

## 2021-05-13 DIAGNOSIS — S52.531D CLOSED COLLES' FRACTURE OF RIGHT RADIUS WITH ROUTINE HEALING: ICD-10-CM

## 2021-05-13 DIAGNOSIS — Z48.89 OTHER SPECIFIED AFTERCARE FOLLOWING SURGERY: ICD-10-CM

## 2021-05-13 DIAGNOSIS — M25.631 WRIST STIFFNESS, RIGHT: Primary | ICD-10-CM

## 2021-05-13 DIAGNOSIS — M25.531 RIGHT WRIST PAIN: ICD-10-CM

## 2021-05-13 PROCEDURE — 97110 THERAPEUTIC EXERCISES: CPT | Mod: GO | Performed by: OCCUPATIONAL THERAPIST

## 2021-05-13 PROCEDURE — 97140 MANUAL THERAPY 1/> REGIONS: CPT | Mod: GO | Performed by: OCCUPATIONAL THERAPIST

## 2021-05-13 PROCEDURE — 97112 NEUROMUSCULAR REEDUCATION: CPT | Mod: GO | Performed by: OCCUPATIONAL THERAPIST

## 2021-05-20 ENCOUNTER — THERAPY VISIT (OUTPATIENT)
Dept: OCCUPATIONAL THERAPY | Facility: CLINIC | Age: 51
End: 2021-05-20
Payer: COMMERCIAL

## 2021-05-20 DIAGNOSIS — M25.631 WRIST STIFFNESS, RIGHT: ICD-10-CM

## 2021-05-20 DIAGNOSIS — Z48.89 OTHER SPECIFIED AFTERCARE FOLLOWING SURGERY: ICD-10-CM

## 2021-05-20 DIAGNOSIS — M25.511 RIGHT SHOULDER PAIN, UNSPECIFIED CHRONICITY: ICD-10-CM

## 2021-05-20 DIAGNOSIS — M25.531 RIGHT WRIST PAIN: Primary | ICD-10-CM

## 2021-05-20 DIAGNOSIS — S52.531D CLOSED COLLES' FRACTURE OF RIGHT RADIUS WITH ROUTINE HEALING: ICD-10-CM

## 2021-05-20 PROCEDURE — 97140 MANUAL THERAPY 1/> REGIONS: CPT | Mod: GO | Performed by: OCCUPATIONAL THERAPIST

## 2021-05-20 PROCEDURE — 97112 NEUROMUSCULAR REEDUCATION: CPT | Mod: GO | Performed by: OCCUPATIONAL THERAPIST

## 2021-05-20 PROCEDURE — 97110 THERAPEUTIC EXERCISES: CPT | Mod: GO | Performed by: OCCUPATIONAL THERAPIST

## 2021-05-20 PROCEDURE — 97018 PARAFFIN BATH THERAPY: CPT | Mod: GO | Performed by: OCCUPATIONAL THERAPIST

## 2021-05-20 NOTE — PROGRESS NOTES
SOAP note objective information for 5/20/2021.  Please refer to the daily flowsheet for treatment today, total treatment time and time spent performing 1:1 timed codes.        Diagnosis:  Right Distal radis fracture  DOI:  1/22/21  DOS:  2/2/21  Procedure:  ORIF  Post: 3 months  Referring MD: Bear Alejo MD @ [facility]    Next MD visit: 6/10/21  Subjective:  Iman Alves is a 50 year old right hand dominant female.    S:  See flowsheet      Occupational Profile Information:  Current occupation is / (self-employed).   Primary job tasks include:  Computer work, driving, prolonged sitting and repetitive tasks.  Problem began: was stepping over the baby/puppy gate and tripped and fell, notes she had a minor concussion after the fall and this was assessed at the ED.  Pior functional level:  no limitations    Barriers include:lives with son, age 13, healing fx left leg  Mobility: No difficulty  Transportation: drives  Leisure activities/hobbies: has a new puppy, walk the puppy, works on the house, was painting and remolding, likes to read  Other: difficulty typing only doing it one handed    Functional Outcome Measure:   Please refer to flowsheet    Objective:    Pain Level Report:  Pain Level (Scale 0-10):   2/15/2021 3/22 4/22 5/6    At Rest 3-4 0  0    With Use 6-7 6-8 5 6-7      Pain Description:  Date 2/15/2021 3/22 4/22 5/6     right        Location  elbow, forearm, wrist and hand  Shoulder and elbow cubital tunnel area since the surgery has been noted       Pain Quality Aching, Dull, Numb, Sharp, Tender, Tingling and zinging Can be sharp if I reach too quickly or far, but overall better, even with a bump to the hand can be shooting, the shoulder does limit what I can do with the hand, or inadvertent wt bearing as when sleeping, or turning the hand palm up down. Pain subsides after the incident Much less often, and numbness in palm is lessening Pain is more on dorsal wrist middle  "and going into palm up it feels like \"cement' does not move.    Frequency intermittent, constant or daily   intermittent      Pain is worst daytime       Exacerbated by Use and typing       Relieved by heat, rest and touching and moving into a better position, OTC as needed       Progression Not better improving improving Some worse      Edema:  resolved  Wound/Scar: closed,light pink  Palpation:  []     Normal        [x]   Tender       [x]  Mild         []   Moderate []   Severe   Location: volar forearm  And mid palm   Sensation: IMPROVING Decreased Median Nerve distribution per pt report , reporting finger tips have good sensation, tender in the mid palm area.  ROM:  Wrist 2/15/2021 2/22/21 3/22 3/29 4/6 4/13 4/22 4/29 5/6/21 5/20   AROM(PROM) Right            Extension 20 25 35  POST: 45 35 37 38 45 41 50 45   Flexion 15 20 35  POST: 45 35 40 47 (55) 54 47 50 40   RD             UD             Supination Amended: 20  55  42 32 with DRUJ  55 w/o DRUJ  42 48 47 (70)   Pronation Amended: 65  70   65  65  58 (68)     ROM:    Thumb  2/15/2021 2/22 3/22 4/13 5/6   AROM(PROM)  Right       MP  0/30       IP  -5/50       RAbd         PAbd  55       Retropulsion         Kapandji Opposition Scale (0-10/10)  5 7 8 8.5        ROM:   Fingers 2/15/2021 2/15/2021 3/22   AROM(PROM) Right Flex lag Flexion lag   Index MP -15 2.5 0   PIP -10     DIP      FERRARA      Long MP -20 2.5 0   PIP -15     DIP      FERRARA      Ring MP -15 2.5 0   PIP -15     DIP      FERRARA      Small MP -10 1.75 0   PIP -10     DIP      FERRARA        Strength   (Measured in pounds)  Pain Report:  + mild    ++ moderate    +++ severe    5/6/2021 5/6/2021   Trials Right* Left   1  2  3 7  10  10 33  30  34   Average 9 32     Lat Pinch 5/6/2021 5/6/2021   Trials Right Left   1  2  3 5  6  6   11  11  10   Average 6 11     3 Pt Pinch 5/6/2021 5/6/2021   Trials Right Left   1  2  3 3  4  4   9  8  8   Average 4 8       Assessment:  Please refer to the daily flowsheet " for treatment today, total treatment time and time spent performing 1:1 timed codes.          Plan:  P:  Frequency/Duration:  Recommend continuing with the current treatment plan. 1 X week, once daily  for 8 weeks    Recommendations for Continued Therapy    Treatment Plan:   Modalities:  Paraffin  Therapeutic Exercise:  AROM, AAROM, PROM, Tendon Gliding, Blocking, Reverse Blocking and Extensor Tracking  Neuromuscular re-education:  Nerve Gliding, Coordination/Dexterity, Sensory re-education, Desensitization and Kinesiotaping  Manual Techniques:  Scar mobilization, Myofascial release and Manual edema mobilization  Self Care:  Ergonomic Considerations  Orthotic Fabrication: Static forearm based orthosis    Discharge Plan:    Achieve all LTG.  Independent in home treatment program.  Reach maximal therapeutic benefit.    Home Program:     4/13/2021  Putty for coordination and manipulation and strengthening    Next Visit:  Progress ROM and strengthening :: wrist isometrics and isotonics

## 2021-05-27 NOTE — PATIENT INSTRUCTIONS - HE
Calcium Oxalate Stone Prevention Self Management    Drink more fluids:    Drinking more liquids is the best way you can help prevent future stones. Stones can form when substances in the urine are too concentrated. The more you drink, the more urine you will make. This means all substances in the urine will be less concentrated.    How much urine should I be producing?    The usual recommended daily urine production is about 2 to 3 quarts (9607-3709 ml). If you are producing more than 3 quarts of urine on a regular basis, it is possible to deplete important minerals stored in the body.    To measure the amount of urine you produce in a day, you can either:    Collect all urine in a container and measure at the end of the day     Use a measuring cup each time you urinate and add up the amounts at the end of the day     Observe    Color - Dark ramon urine is concentrated. Light straw color or lighter is dilute and desirable     Odor - Concentrated urine tends to smell stronger. Dilute urine is nearly odorless    Ways to increase your fluid intake    Increasing the amount of fluid you drink is effective for all types of kidney stones. While water is commonly recommended, all fluids are effective for increasing the amount of urine your body produces.    Focus on starting a lifelong habit, rather than a short-term solution.     Keep liquids on hand that you like. Crystal Light is a low calorie appropriate choice.    Drink out of larger glasses. You'll tend to drink more with each serving.     Have an additional glass of fluid with each meal.     Keep a water or drink bottle at work and fill it regularly.     *If you are prone to fluid retention, consult your doctor before making changes to your fluid habits.    Low Oxalate Diet:    Avoid excess amounts or daily consumption of these foods:    All nuts and nut products including peanuts, almonds, pecans, peanut butter, almond milk    Rhubarb    Chocolate    Soybeans and  soy products     Spinach    Wheat Germ    Beets    Maintain a normal calcium diet:    Researches have found that people with low calcium intakes tend to have more stones. Foods with high calcium content are acceptable and include:    Dairy products (including milk, cheese and yogurt)    Meat and fish    Enriched cereals    Dark green vegetables    What about calcium supplements?     Many people take calcium supplements, either on their own or as prescribed by a doctor. Research has indicated that calcium supplements do not usually pose a risk for stone formation.  Calcium citrate is a better choice for a supplement.    Avoid excess salt:    Salt (sodium chloride) is found in abundance in many foods. High sodium levels in the urine can interfere with the kidney's handling of calcium.     Tips for reducing the salt in your diet:    Don't use salt at the table    Reduce the salt used in food preparation. Try 1/2 teaspoon when recipes call for 1 teaspoon.    Use herbs and spices for flavoring instead of salt.    Avoid salty foods.    Check the label before you buy or use a product. Note sodium and portion size information.    Try to consume less than 2,000 mg/day. (1 teaspoon = 2,000 mg)    Foods with high sodium content include:    Processed meat (including luncheon meats, sausage)     Crackers     Instant cereal     Processed cheese     Canned soups     Chips and snack foods     Soy sauce    [facility] can respond to your questions or concerns 24 hours a day at [phone].

## 2021-05-27 NOTE — PROGRESS NOTES
Assessment/Plan:        Diagnoses and all orders for this visit:    Calculus of kidney  -     XR Abdomen AP; Future; Expected date: 04/15/2022    History of kidney stones  -     Urinalysis Macroscopic    Other orders  -     calcium citrate (CALCITRATE) 200 mg (950 mg) tablet; Take 1 tablet by mouth daily.      Stone Management Plan  [facility] Stone Management 11/11/2015 12/7/2015 4/15/2019   Urinary Tract Infection No suspicion of infection No suspicion of infection No suspicion of infection   Renal Colic Asymptomatic at this time Asymptomatic at this time Well controlled symptoms   Renal Failure No suspicion of renal failure No suspicion of renal failure No suspicion of renal failure   Current CT date 11/11/2015 - 4/15/2019   Right sided stones? Yes - Yes   R Number of ureteral stones No ureteral stones - No ureteral stones   R Number of kidney stones  1 - 1   R GSD of kidney stones 4 - 10 - 4 - 10   R Hydronephrosis None - None   R Stone Event No current event No current event No current event   R Current Plan Observe Observe Observe   Observe rationale Pending stone risk evaluation Limited stone burden with good prognosis for spontaneous passage Limited stone burden with good prognosis for spontaneous passage   Left sided stones? No - No   L Stone Event No current event No current event No current event             Subjective:      HPI  Ms. Iman Alves is a 48 y.o.  female presenting to the [facility] for long term management.    She is a first time unidentified composition stone former who has not required stone clearance procedures. She has previously participated in stone risk evaluation and remains adherent to recommendations. She has identified modifiable stone risks including:  low urine volume. She has no identified non-modifiable stone risk factors.    She was seen in 2015 for prevention follow up with initial workup demonstrating risk of low urine volume. She returns for her 3  year surveillance follow up with known solitary right renal stone.     She is asymptomatic at present. She denies symptoms of fever, chills, flank pain, nausea, vomiting, urinary frequency and dysuria.     CT scan from today is personally reviewed and demonstrates stable 5 mm right upper pole renal stone. No ureteral stones. No hydronephrosis.    Significant labs from presentation include no hematuria, no pyuria, negative nitrite and no bacteria.    PLAN    49 yo F with solitary, stable non-obstructing right renal stone.    Based on review of findings with the patient, she will transition to long term management and return in 36 months with KUB.  Patient verbalized understanding. Patient agrees with plan as discussed.    Patient also seen and examined by BOZENA Ellis   Review of Systems  Review of systems is negative except for HPI    Past Medical History:   Diagnosis Date     Kidney stone     2008       Past Surgical History:   Procedure Laterality Date     [place] ANESTH,REPAIR UPPER ABD HERNIA NOS      umbilical       Current Outpatient Medications   Medication Sig Dispense Refill     ascorbic acid (ASCORBIC ACID WITH ROBERT HIPS) 500 MG tablet Take 500 mg by mouth daily.       b complex vitamins tablet Take 1 tablet by mouth 2 (two) times a day.       calcium citrate (CALCITRATE) 200 mg (950 mg) tablet Take 1 tablet by mouth daily.       cholecalciferol, vitamin D3, (VITAMIN D3) 1,000 unit capsule Take 2,000 Units by mouth daily.        ENZYMES,DIGESTIVE (DIGESTIVE ENZYMES ORAL) Take 1 capsule by mouth 3 (three) times a day.       LACTOBAC CMB #3/FOS/PANTETHINE (PROBIOTIC & ACIDOPHILUS ORAL) Take 80 each by mouth.       magnesium gluconate (MAGONATE) 27.5 mg (500 mg) tablet Take 240 mg by mouth 2 (two) times a day.       OMEGA-3 FATTY ACIDS/FISH OIL (SEA-OMEGA 30 ORAL) Take 1 capsule by mouth 2 (two) times a day.       PV W-O WILTON/FERROUS FUMARATE/FA (M-VIT ORAL) Take 1 tablet by mouth daily.       No  current facility-administered medications for this visit.        No Known Allergies    Social History     Socioeconomic History     Marital status:      Spouse name: Not on file     Number of children: Not on file     Years of education: Not on file     Highest education level: Not on file   Occupational History     Occupation: nutritional therapy practitioner   Social Needs     Financial resource strain: Not on file     Food insecurity:     Worry: Not on file     Inability: Not on file     Transportation needs:     Medical: Not on file     Non-medical: Not on file   Tobacco Use     Smoking status: Never Smoker     Smokeless tobacco: Never Used   Substance and Sexual Activity     Alcohol use: No     Drug use: No     Sexual activity: Not on file   Lifestyle     Physical activity:     Days per week: Not on file     Minutes per session: Not on file     Stress: Not on file   Relationships     Social connections:     Talks on phone: Not on file     Gets together: Not on file     Attends Spiritism service: Not on file     Active member of club or organization: Not on file     Attends meetings of clubs or organizations: Not on file     Relationship status: Not on file     Intimate partner violence:     Fear of current or ex partner: Not on file     Emotionally abused: Not on file     Physically abused: Not on file     Forced sexual activity: Not on file   Other Topics Concern     Not on file   Social History Narrative     Not on file       Family History   Problem Relation Age of Onset     Osteoporosis Mother      Prostate cancer Father         at age 70     No Medical Problems Sister      No Medical Problems Daughter      No Medical Problems Maternal Grandmother      Urolithiasis Maternal Grandfather         recurrent     Gout Maternal Grandfather      Prostate cancer Maternal Grandfather         at age 70     Cancer Maternal Grandfather         non hodgkin's at age 90     No Medical Problems Paternal Grandmother       No Medical Problems Paternal Grandfather      No Medical Problems Maternal Aunt      No Medical Problems Paternal Aunt      Urolithiasis Maternal Uncle      Urolithiasis Cousin      BRCA 1/2 Neg Hx      Breast cancer Neg Hx      Colon cancer Neg Hx      Endometrial cancer Neg Hx      Ovarian cancer Neg Hx        Objective:      Physical Exam  Vitals:    04/15/19 0905   BP: 92/51   Pulse: (!) 56   Temp: 98.2  F (36.8  C)     General - well developed, well nourished, appropriate for age. Appears no distress at this time  Abdomen - slender soft, non-tender, no hepatosplenomegaly, no masses.   - no flank tenderness, no suprapubic tenderness, kidney and bladder non-palpable  MSK - normal spinal curvature. no spinal tenderness. normal gait. muscular strength intact.  Psych - oriented to time, place, and person, normal mood and affect.      Labs  Urinalysis POC (Office):  Blood UA   Date Value Ref Range Status   12/07/2015 Negative Negative Final   11/11/2015 Negative Negative Final     Nitrite, UA   Date Value Ref Range Status   04/15/2019 Negative Negative Final   12/07/2015 Negative Negative Final   11/11/2015 Negative Negative Final     Leukocytes, UA    Date Value Ref Range Status   12/07/2015 Small (!) Negative Final   11/11/2015 Negative Negative Final     pH UA   Date Value Ref Range Status   12/07/2015 6.5 4.5 - 8.0 Final   11/11/2015 7.0 4.5 - 8.0 Final       Lab Urinalysis:  Blood, UA   Date Value Ref Range Status   04/15/2019 Trace (!) Negative Final     Nitrite, UA   Date Value Ref Range Status   04/15/2019 Negative Negative Final   12/07/2015 Negative Negative Final   11/11/2015 Negative Negative Final     Leukocytes, UA   Date Value Ref Range Status   04/15/2019 Negative Negative Final     pH, UA   Date Value Ref Range Status   04/15/2019 6.0 5.0 - 8.0 Final

## 2021-05-28 NOTE — TELEPHONE ENCOUNTER
Test Results  Who is calling?:  Patient  Who ordered the test:  Dr. Molina  Type of test: Lab  Date of test:  4/29/19  Where was the test performed:  Clinic  What are your questions/concerns?:  Patient saw results on MyChart but there is no narrative from PCP. Please call patient to explain results.  Okay to leave a detailed message?:  Yes

## 2021-05-28 NOTE — TELEPHONE ENCOUNTER
Spoke with pt, pt is requesting results of pap smear. Informed pt PCP has not sent result notes and PCP is out today. Informed pt will send request to PCP to address tomorrow. Thank you.

## 2021-05-28 NOTE — PROGRESS NOTES
FEMALE ADULT PREVENTIVE EXAM    CHIEF COMPLAINT:  Female preventive exam.    SUBJECTIVE:  Iman Alves is a 48 y.o. female who presents for her routine physical exam.    Patient would like to address the following concerns today:   Chief Complaint   Patient presents with     Annual Exam    Abdominal pain is about the same.  Learning to live with it.    GYNE HISTORY  Menses: monthly but irregular  Sexually Active: yes  Contraception: vasectomy  Last Pap: 2017 negative HPV with LSIL - colposcopy  Abnormal Pap: yes        She  has a past medical history of Kidney stone.    Lab Results   Component Value Date    WBC 3.5 (L) 11/11/2013    HGB 13.6 11/11/2013    HCT 40.2 11/11/2013    MCV 91 11/11/2013     11/11/2013     12/01/2015    K 4.1 12/01/2015    BUN 15 12/01/2015     No results found for: CHOL, HDL, LDLCALC, TRIG  Lab Results   Component Value Date    TSH 1.7 11/11/2013     BP Readings from Last 3 Encounters:   04/29/19 100/62   04/15/19 92/51   08/10/17 98/62       Surgeries:    Past Surgical History:   Procedure Laterality Date     [place] ANESTH,REPAIR UPPER ABD HERNIA NOS      umbilical       Family History:  Her family history includes Cancer in her maternal grandfather; Gout in her maternal grandfather; No Medical Problems in her daughter, maternal aunt, maternal grandmother, paternal aunt, paternal grandfather, paternal grandmother, and sister; Osteoporosis in her mother; Prostate cancer in her father and maternal grandfather; Urolithiasis in her cousin, maternal grandfather, and maternal uncle.    Social History:  She  reports that she has never smoked. She has never used smokeless tobacco. She reports that she does not drink alcohol or use drugs. Lives with 12 yo son.  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    Medications:    Current Outpatient Medications:      ascorbic acid (ASCORBIC ACID WITH ROBERT HIPS) 500 MG tablet, Take 500 mg by mouth daily., Disp: , Rfl:      b complex vitamins  tablet, Take 1 tablet by mouth 2 (two) times a day., Disp: , Rfl:      calcium citrate (CALCITRATE) 200 mg (950 mg) tablet, Take 1 tablet by mouth daily., Disp: , Rfl:      cholecalciferol, vitamin D3, (VITAMIN D3) 1,000 unit capsule, Take 2,000 Units by mouth daily. , Disp: , Rfl:      ENZYMES,DIGESTIVE (DIGESTIVE ENZYMES ORAL), Take 1 capsule by mouth 3 (three) times a day., Disp: , Rfl:      LACTOBAC CMB #3/FOS/PANTETHINE (PROBIOTIC & ACIDOPHILUS ORAL), Take 80 each by mouth., Disp: , Rfl:      OMEGA-3 FATTY ACIDS/FISH OIL (SEA-OMEGA 30 ORAL), Take 1 capsule by mouth 2 (two) times a day., Disp: , Rfl:      PV W-O WILTON/FERROUS FUMARATE/FA (M-VIT ORAL), Take 1 tablet by mouth daily., Disp: , Rfl:      magnesium gluconate (MAGONATE) 27.5 mg (500 mg) tablet, Take 240 mg by mouth 2 (two) times a day., Disp: , Rfl:   HELD MEDICATIONS: None.    Allergies:  No latex allergies.  No Known Allergies         RISK BEHAVIOR & HEALTH HABITS  Regular Exercise: walking.  Balanced diet: yes.  Seat Belt Use: yes  Calcium/Vitamin D: yes.  Stress management: no    Guns: NO  Dental Care: YES    REVIEW OF SYSTEMS:  Complete head to toe review of systems is otherwise negative except as above.    OBJECTIVE:  VITAL SIGNS:    Vitals:    04/29/19 1333   BP: 100/62   Pulse: 68   Resp: 16   Temp: 97.3  F (36.3  C)     GENERAL:  Patient alert, in no acute distress.  EYES: PERRLA. Extraoccular movements intact, pupils equal, reactive to light and accommodation.  Normal conjunctiva and lids.    ENT:  Hearing grossly normal.  Normal appearance to ears and nose.  Bilateral TM s, external canals, oropharynx normal. Normal lips, gums and teeth.    NECK:  Supple, without thyromegaly or mass.  RESP:  Clear to auscultation without crackles, wheezes or distress.  Normal respiratory effort.   CV:  Regular rate and rhythm without murmurs, rubs or gallops.  Normal pedal pulses.  No varicosities or edema.  ABDOMEN:  Soft, non-tender, without  hepatosplenomegaly, masses, or hernias.   BREASTS:  Nontender, without masses, nipple discharge, erythema, or axillary adenopathy.    :    External genitalia:  Normal without lesions.  Urethra: Normal appearing  Vagina: Normal without discharge  Cervix: Pink.  No CMT.  Uterus:  Nontender, mobile, without masses  Ovaries: Normal without masses  LYMPHATIC: Normal palpation of neck.  No lymphadenopathy.  No bruising.  NEURO:  CN II-XII intact, motor & sensory function all intact.  DTR and reflexes normal.  PSYCHIATRIC:  Alert & oriented with normal mood and affect.  Good judgment and insight.  SKIN:  Normal inspection and palpation.  MUSCULOSKELETAL: Normal gait and station.  - Spine / Ribs / Pelvis: Normal inspection, ROM, stability and strength: Spine, Head, Neck, Upper and Lower Extremities.    Iman was seen today for annual exam.    Diagnoses and all orders for this visit:    Routine general medical examination at a health care facility  -     Td, Preservative Free (green label)    Screening for cervical cancer  -     Gynecologic Cytology (PAP Smear)    Special screening for malignant neoplasms, colon  -     Cologuard     Will do a fasting lipid profile next year.      Keily Molina

## 2021-05-31 VITALS — WEIGHT: 101.5 LBS | HEIGHT: 64 IN | BODY MASS INDEX: 17.33 KG/M2

## 2021-05-31 VITALS — BODY MASS INDEX: 17.42 KG/M2 | WEIGHT: 102 LBS | HEIGHT: 64 IN

## 2021-05-31 NOTE — TELEPHONE ENCOUNTER
Concerned that she is having kidney stone symptoms    Monitored for a stable kidney stone for a while now    In the Last 2-3 hours, patient woke up feeling urinary urgency, went to the bathroom but now the sensation keeps re-occuring    States there is also a Tightness above right hip on the back, rates 4/10    Constantly feels the need to urinate, small amounts are coming out, but thinks the amount is due to how little she has drank and how much she has urinated.  Pain in bladder 6/10    Pain is pretty constant in both regions.     Unable to sleep due to discomfort   Slightly nauseated     No fever  No other abdominal pain  No burning with urination   No blood in urine  No vomiting  No leg weakness    Triaged to a disposition of See a Physician within 24 hours. Patient plans to call [facility] when they open to see if she can get an appointment. Care advice given. Patient to call back with further questions.     Reason for Disposition    MODERATE pain (e.g., interferes with normal activities or awakens from sleep)    Protocols used: FLANK PAIN-A-    Clementina Sim RN Triage Nurse Advisor [facility]

## 2021-05-31 NOTE — TELEPHONE ENCOUNTER
Patient has known solitary right upper pole stone and has been followed by [facility] for some time.  Last evening she developed severe right flank pain called [facility] and they discussed her issue with her.  Her pain improved somewhat and she now has some feeling of urgency and frequency.  She has no fever.  She did have some nausea last night.    Explained that a 4 to 5 mm stone was certainly passable and it sounds like it is in the intramural tunnel (bladder wall).  Gave patient advice for protocol drugs namely Dramamine 50 mg at bedtime acetaminophen thousand milligrams every 6 hours and ibuprofen 400 mg every 6 hours while she is having symptoms.  We will put in a prescription for oxycodone No. 12 to use 1-2 5 mg tablets every 4 hours as needed for severe pain.  Also gave her Zofran for nausea.  Instructed her to call if she has pain that is not tolerable but if it is after hours go to the emergency room at [facility] and cannot be controlled we would have to intervene.  Explained that statistically she will pass the stone.  She will have someone stop by the office and  a collapsible strainer and a strainer to go with a hat for home use.

## 2021-06-01 ENCOUNTER — RECORDS - HEALTHEAST (OUTPATIENT)
Dept: ADMINISTRATIVE | Facility: CLINIC | Age: 51
End: 2021-06-01

## 2021-06-03 ENCOUNTER — THERAPY VISIT (OUTPATIENT)
Dept: OCCUPATIONAL THERAPY | Facility: CLINIC | Age: 51
End: 2021-06-03
Payer: COMMERCIAL

## 2021-06-03 VITALS — BODY MASS INDEX: 18.71 KG/M2 | WEIGHT: 109 LBS

## 2021-06-03 VITALS — HEIGHT: 64 IN | BODY MASS INDEX: 18.1 KG/M2 | WEIGHT: 106 LBS

## 2021-06-03 DIAGNOSIS — M25.511 RIGHT SHOULDER PAIN, UNSPECIFIED CHRONICITY: ICD-10-CM

## 2021-06-03 DIAGNOSIS — M25.631 WRIST STIFFNESS, RIGHT: ICD-10-CM

## 2021-06-03 DIAGNOSIS — Z48.89 OTHER SPECIFIED AFTERCARE FOLLOWING SURGERY: ICD-10-CM

## 2021-06-03 DIAGNOSIS — M25.531 RIGHT WRIST PAIN: Primary | ICD-10-CM

## 2021-06-03 DIAGNOSIS — S52.531D CLOSED COLLES' FRACTURE OF RIGHT RADIUS WITH ROUTINE HEALING: ICD-10-CM

## 2021-06-03 PROCEDURE — 97110 THERAPEUTIC EXERCISES: CPT | Mod: GO | Performed by: OCCUPATIONAL THERAPIST

## 2021-06-03 PROCEDURE — 97140 MANUAL THERAPY 1/> REGIONS: CPT | Mod: GO | Performed by: OCCUPATIONAL THERAPIST

## 2021-06-03 NOTE — PROGRESS NOTES
"SOAP note objective information for 6/3/2021.  Please refer to the daily flowsheet for treatment today, total treatment time and time spent performing 1:1 timed codes.     Diagnosis:  Right Distal radis fracture  DOI:  1/22/21  DOS:  2/2/21  Procedure:  ORIF  Post: 3 months  Referring MD: Bear Alejo MD @ [facility]    Next MD visit: 6/10/21      Pain Level Report:  Pain Level (Scale 0-10):   2/15/2021 3/22 4/22 5/6    At Rest 3-4 0  0    With Use 6-7 6-8 5 6-7      Pain Description:  Date 2/15/2021 3/22 4/22 5/6     right        Location  elbow, forearm, wrist and hand  Shoulder and elbow cubital tunnel area since the surgery has been noted       Pain Quality Aching, Dull, Numb, Sharp, Tender, Tingling and zinging Can be sharp if I reach too quickly or far, but overall better, even with a bump to the hand can be shooting, the shoulder does limit what I can do with the hand, or inadvertent wt bearing as when sleeping, or turning the hand palm up down. Pain subsides after the incident Much less often, and numbness in palm is lessening Pain is more on dorsal wrist middle and going into palm up it feels like \"cement' does not move.    Frequency intermittent, constant or daily   intermittent      Pain is worst daytime       Exacerbated by Use and typing       Relieved by heat, rest and touching and moving into a better position, OTC as needed       Progression Not better improving improving Some worse      Edema:  resolved  Wound/Scar: closed,light pink  Palpation:  []     Normal        [x]   Tender       [x]  Mild         []   Moderate []   Severe   Location: volar forearm  And mid palm   Sensation: IMPROVING Decreased Median Nerve distribution per pt report , reporting finger tips have good sensation, tender in the mid palm area.  ROM:  Wrist 2/15/2021 2/22/21 3/22 3/29 4/6 4/13 4/22 4/29 5/6/21 5/20 6/3   AROM(PROM) Right             Extension 20 25 35  POST: 45 35 37 38 45 41 50 45 42   Flexion 15 20 " 35  POST: 45 35 40 47 (55) 54 47 50 40 40   RD              UD              Supination Amended: 20  55  42 32 with DRUJ  55 w/o DRUJ  42 48 47 (70) Pre: 50  Post: 70   Pronation Amended: 65  70   65  65  58 (68) 65     ROM:    Thumb  2/15/2021 2/22 3/22 4/13 5/6   AROM(PROM)  Right       MP  0/30       IP  -5/50       RAbd         PAbd  55       Retropulsion         Kapandji Opposition Scale (0-10/10)  5 7 8 8.5        ROM:   Fingers 2/15/2021 2/15/2021 3/22   AROM(PROM) Right Flex lag Flexion lag   Index MP -15 2.5 0   PIP -10     DIP      FERRARA      Long MP -20 2.5 0   PIP -15     DIP      FERRARA      Ring MP -15 2.5 0   PIP -15     DIP      FERRARA      Small MP -10 1.75 0   PIP -10     DIP      FERRARA        Strength   (Measured in pounds)  Pain Report:  + mild    ++ moderate    +++ severe    5/6/2021 5/6/2021   Trials Right* Left   1  2  3 7  10  10 33  30  34   Average 9 32     Lat Pinch 5/6/2021 5/6/2021   Trials Right Left   1  2  3 5  6  6   11  11  10   Average 6 11     3 Pt Pinch 5/6/2021 5/6/2021   Trials Right Left   1  2  3 3  4  4   9  8  8   Average 4 8       Home Program:   4/13/2021  Putty for coordination and manipulation and strengthening    Next Visit:  Progress ROM and strengthening :: wrist isometrics and isotonics

## 2021-06-05 ENCOUNTER — RECORDS - HEALTHEAST (OUTPATIENT)
Dept: SCHEDULING | Facility: CLINIC | Age: 51
End: 2021-06-05

## 2021-06-05 VITALS
SYSTOLIC BLOOD PRESSURE: 100 MMHG | WEIGHT: 106 LBS | DIASTOLIC BLOOD PRESSURE: 64 MMHG | BODY MASS INDEX: 17.66 KG/M2 | HEIGHT: 65 IN | HEART RATE: 84 BPM

## 2021-06-05 DIAGNOSIS — N64.4 MASTODYNIA: ICD-10-CM

## 2021-06-10 ENCOUNTER — THERAPY VISIT (OUTPATIENT)
Dept: OCCUPATIONAL THERAPY | Facility: CLINIC | Age: 51
End: 2021-06-10
Payer: COMMERCIAL

## 2021-06-10 ENCOUNTER — THERAPY VISIT (OUTPATIENT)
Dept: PHYSICAL THERAPY | Facility: CLINIC | Age: 51
End: 2021-06-10
Payer: COMMERCIAL

## 2021-06-10 DIAGNOSIS — M25.511 RIGHT SHOULDER PAIN, UNSPECIFIED CHRONICITY: ICD-10-CM

## 2021-06-10 DIAGNOSIS — M25.531 RIGHT WRIST PAIN: Primary | ICD-10-CM

## 2021-06-10 DIAGNOSIS — M25.631 WRIST STIFFNESS, RIGHT: ICD-10-CM

## 2021-06-10 DIAGNOSIS — S52.531D CLOSED COLLES' FRACTURE OF RIGHT RADIUS WITH ROUTINE HEALING: ICD-10-CM

## 2021-06-10 DIAGNOSIS — M25.511 SHOULDER PAIN, RIGHT: Primary | ICD-10-CM

## 2021-06-10 DIAGNOSIS — Z48.89 OTHER SPECIFIED AFTERCARE FOLLOWING SURGERY: ICD-10-CM

## 2021-06-10 PROCEDURE — 97140 MANUAL THERAPY 1/> REGIONS: CPT | Mod: GO | Performed by: OCCUPATIONAL THERAPIST

## 2021-06-10 PROCEDURE — 97022 WHIRLPOOL THERAPY: CPT | Mod: GO | Performed by: OCCUPATIONAL THERAPIST

## 2021-06-10 PROCEDURE — 97530 THERAPEUTIC ACTIVITIES: CPT | Mod: GP | Performed by: PHYSICAL THERAPIST

## 2021-06-10 PROCEDURE — 97110 THERAPEUTIC EXERCISES: CPT | Mod: GP | Performed by: PHYSICAL THERAPIST

## 2021-06-10 PROCEDURE — 97110 THERAPEUTIC EXERCISES: CPT | Mod: GO | Performed by: OCCUPATIONAL THERAPIST

## 2021-06-10 NOTE — PROGRESS NOTES
FEMALE ADULT PREVENTIVE EXAM    CHIEF COMPLAINT:  Female preventive exam.    SUBJECTIVE:  Iman Yates is a 46 y.o. female who presents for her routine physical exam.    Patient would like to address the following concerns today:   Chief Complaint   Patient presents with     Annual Exam     fasting, pap was done more recent than 2013    She continues to struggle with chronic pelvic pain.    GYNE HISTORY  Menses: yes  Sexually Active: yes  Contraception: vasectomy  Last Pap: Few years ago - possibly abnormal  Abnormal Pap: no      She  has a past medical history of Kidney stone.    Lab Results   Component Value Date    WBC 3.5 (L) 11/11/2013    HGB 13.6 11/11/2013    HCT 40.2 11/11/2013    MCV 91 11/11/2013     11/11/2013     12/01/2015    K 4.1 12/01/2015    BUN 15 12/01/2015     No results found for: CHOL, HDL, LDLCALC, TRIG  Lab Results   Component Value Date    TSH 1.7 11/11/2013     BP Readings from Last 3 Encounters:   05/26/17 100/60   12/07/15 (!) 88/46   11/11/15 109/59       Surgeries:    Past Surgical History:   Procedure Laterality Date     [place] ANESTH,REPAIR UPPER ABD HERNIA NOS      umbilical       Family History:  Her family history includes Cancer in her maternal grandfather; Gout in her maternal grandfather; No Medical Problems in her daughter, maternal aunt, maternal grandmother, paternal aunt, paternal grandfather, paternal grandmother, and sister; Osteoporosis in her mother; Prostate cancer in her father and maternal grandfather; Urolithiasis in her cousin, maternal grandfather, and maternal uncle. There is no history of BRCA 1/2, Breast cancer, Colon cancer, Endometrial cancer, or Ovarian cancer.    Social History:  She  reports that she has never smoked. She has never used smokeless tobacco. She reports that she does not drink alcohol or use illicit drugs. Lives with 8 yo son.  Self-employed and works at home.  No alcohol.    Medications:    Current Outpatient Prescriptions:       ascorbic acid (ASCORBIC ACID WITH ROBERT HIPS) 500 MG tablet, Take 500 mg by mouth daily., Disp: , Rfl:      b complex vitamins tablet, Take 1 tablet by mouth 2 (two) times a day., Disp: , Rfl:      calcium-vitamin D 250-100 mg-unit per tablet, Take 1 tablet by mouth 2 (two) times a day., Disp: , Rfl:      cholecalciferol, vitamin D3, (VITAMIN D3) 1,000 unit capsule, Take 2,000 Units by mouth daily. , Disp: , Rfl:      ENZYMES,DIGESTIVE (DIGESTIVE ENZYMES ORAL), Take 1 capsule by mouth 3 (three) times a day., Disp: , Rfl:      LACTOBAC CMB #3/FOS/PANTETHINE (PROBIOTIC & ACIDOPHILUS ORAL), Take 80 each by mouth., Disp: , Rfl:      magnesium gluconate (MAGONATE) 27.5 mg (500 mg) tablet, Take 240 mg by mouth 2 (two) times a day., Disp: , Rfl:      OMEGA-3 FATTY ACIDS/FISH OIL (SEA-OMEGA 30 ORAL), Take 1 capsule by mouth 2 (two) times a day., Disp: , Rfl:      PV W-O WILTON/FERROUS FUMARATE/FA (M-VIT ORAL), Take 1 tablet by mouth daily., Disp: , Rfl:   HELD MEDICATIONS: None.  Supplements: yes    Allergies:  No latex allergies.  No Known Allergies         RISK BEHAVIOR & HEALTH HABITS  Regular Exercise: walking.  Balanced diet: yes.  Calcium/vitamin D: 2000 IU daily  Stress management: walking  Seat Belt Use: yes      Mammogram: yes- 2017 normal    Guns: NO  Dental Care: YES    REVIEW OF SYSTEMS:  Complete head to toe review of systems is otherwise negative except as above.    OBJECTIVE:  VITAL SIGNS:    Vitals:    05/26/17 1309   BP: 100/60   Pulse: 60   Resp: 12     GENERAL:  Patient alert, in no acute distress.  EYES: PERRLA. Extraoccular movements intact, pupils equal, reactive to light and accommodation.  Normal conjunctiva and lids.   ENT:  Hearing grossly normal.  Normal appearance to ears and nose.  Bilateral TM s, external canals, oropharynx normal. Normal lips, gums and teeth.   NECK:  Supple, without thyromegaly or mass.  RESP:  Clear to auscultation without crackles, wheezes or distress.  Normal respiratory  effort.   CV:  Regular rate and rhythm without murmurs, rubs or gallops.  Normal pedal pulses.  No varicosities or edema.  ABDOMEN:  Soft, non-tender, without hepatosplenomegaly, masses, or hernias.   BREASTS:  Nontender, without masses, nipple discharge, erythema, or axillary adenopathy.    GYN:  Normal external genitalia.  Vagina pink with scant discharge.  Cervix pink with no cervical motion tenderness.  Uterus small, anteverted and non tender.  No adnexal tenderness or enlargement.   LYMPHATIC: No cervical or axillary lymphadenopathy.  No bruising.  NEURO:  CN II-XII intact, motor & sensory function all intact.  DTR and reflexes normal.  PSYCHIATRIC:  Alert & oriented with normal mood and affect.  Good judgment and insight.  SKIN:  Normal inspection and palpation.  MUSCULOSKELETAL: Normal gait and station.  - Spine / Ribs / Pelvis: Normal inspection, ROM, stability and strength: Spine, Head, Neck, Upper and Lower Extremities.      Iman was seen today for annual exam.    Diagnoses and all orders for this visit:    Routine general medical examination at a health care facility  Up to date with mammogram.  Continue vitamin D.    Screening for cervical cancer  -     Gynecologic Cytology (PAP Smear)    Chronic pelvic pain in female- Consider yoga therapy with Jackie Resendiz.          Keily Molina

## 2021-06-10 NOTE — LETTER
[facility]  [address] [address]  [phone]    Saima 10, 2021    Re: Iman Alves   :   1970  MRN:  [id number]   REFERRING PHYSICIAN:   Bear SIMON [facility]    Date of Initial Evaluation:  2/15/21  Visits:  Rxs Used: 19  Reason for Referral:     Right shoulder pain, unspecified chronicity  Closed Colles' fracture of right radius with routine healing  Right wrist pain  Wrist stiffness, right  Other specified aftercare following surgery    Hand Therapy Progress Note  6/10/2021  Reporting period: 21 to current date    Diagnosis:  Right Distal radius fracture  DOI:  21  DOS:  21  Procedure:  ORIF  Post: 4 months+    Referring MD: Bear Alejo MD @ [facility]  Next MD visit: 21    S:  Subjective changes as noted by patient: pt notes very slow progress, can turn ignition off, and one click of 2 toward the on position. Noting more mid forearm pain volar and dorsal.. Also notes some elbow pain. She is starting PT again toda   Functional changes noted by patient: Improvement in Household Chores and Driving   Response to previous treatment:  good and fair  Patient has noted adverse reaction to:   None  Pain Level Report:  Pain Level (Scale 0-10):   2/15/2021 3/22 4/22 5/6 6/10/21   At Rest 3-4 0  0 0   With Use 6-7 6-8 5 6-7 5     Pain Description:  Date 2/15/2021 3/22 4/22 5/6 6/10/21    right        Location  elbow, forearm, wrist and hand  Shoulder and elbow cubital tunnel area since the surgery has been noted       Pain Quality Aching, Dull, Numb, Sharp, Tender, Tingling and zinging Can be sharp if I reach too quickly or far, but overall better, even with a bump to the hand can be shooting, the shoulder does limit what I can do with the hand, or inadvertent wt bearing as when sleeping, or turning the hand palm up down. Pain subsides after the incident Much  "less often, and numbness in palm is lessening Pain is more on dorsal wrist middle and going into palm up it feels like \"cement' does not move. More swollen, fingers are more stiff. Noting a click in the wrist, ulnar aspect   Frequency intermittent, constant or daily   intermittent      Pain is worst daytime       Exacerbated by Use and typing       Relieved by heat, rest and touching and moving into a better position, OTC as needed       Progression Not better improving improving Some worse      Edema:  resolved  Wound/Scar: closed,light pink  Palpation:  []     Normal        [x]   Tender       [x]  Mild         []   Moderate []   Severe   Location: volar forearm  And mid palm   Sensation: IMPROVING Decreased Median Nerve distribution per pt report , reporting finger tips have good sensation, tender in the mid palm area.  ROM:  Wrist 2/15/2021 3/22 4/6 4/13 4/29 5/20 6/3 6/10/21   AROM(PROM) Right          Extension 20 35  POST: 45 37 38 41 45 42 40   Flexion 15 35  POST: 45 40 47 (55) 47 40 40 40   RD           UD           Supination Amended: 20 55 42 32 with DRUJ  55 w/o DRUJ 42 47 (70) Pre: 50  Post: 70 Pre: 55  Post: 66   Pronation Amended: 65 70  65 65 58 (68) 65 65     ROM:    Thumb  2/15/2021 2/22 3/22 4/13    AROM(PROM)  Right       MP  0/30       IP  -5/50       RAbd         PAbd  55       Retropulsion         Kapandji Opposition Scale (0-10/10)  5 7 8 8.5        ROM:   Fingers 2/15/2021 2/15/2021 3/22   AROM(PROM) Right Flex lag Flexion lag   Index MP -15 2.5 0   PIP -10     DIP      FERRARA      Long MP -20 2.5 0   PIP -15     DIP      FERRARA      Ring MP -15 2.5 0   PIP -15     DIP      FERRARA      Small MP -10 1.75 0   PIP -10     DIP      FERRARA        Strength   (Measured in pounds)  Pain Report:  + mild    ++ moderate    +++ severe    5/6/2021 5/6/2021 6/10/21   Trials Right* Left Right   1  2  3 7  10  10 33  30  34 13  10  10   Average 9 32 11     Lat Pinch 5/6/2021 5/6/2021 6/10/21   Trials Right Left " "RIght   1  2  3 5  6  6 11  11  10 5  5  5   Average 6 11 5     3 Pt Pinch 5/6/2021 5/6/2021 6/10/21   Trials Right Left Right   1  2  3 3  4  4 9  8  8 3  4  3   Average 4 8 3     Assessment:  Response to therapy has been improvement to:  ROM of Wrist: slow but steady progress of  Ext , Flex, Supination and Pronation  Strength:   strength, however pinch strength is not progressing  Pain:  frequency is less, intensity of pain is decreased and less tender over affected area  Self Care Skills:  \"glacially\" making progress in all tasks    Overall Assessment:  Patient would benefit from continued therapy to achieve rehab potential  STG/LTG:  STGoals have been reviewed and progress or achievement has occurred;  see goal sheet for details and updates.  I have re-evaluated this patient and find that the nature, scope, duration and intensity of the therapy is appropriate for the medical condition of the patient.    P:  Frequency/Duration:  Recommend continuing with the current treatment plan. 1 X week, once daily  for 2 months    Recommendations for Continued Therapy  Treatment Plan:  Additions to Treatment Plan -  Modalities:  Fluidotherapy and Paraffin, progressing strengthening    Therapeutic Exercise:  AROM, AAROM, PROM, Isotonics and Isometrics  Neuromuscular re-education:  Kinesthetic Training, Proprioceptive Training and Stabilization  Manual Techniques:  Scar mobilization and Myofascial release  Self Care:  Self Care Tasks and Ergonomic Considerations    Home Program:   4/13/2021  Put for coordination and manipulation and strengthening    Next Visit:  Progress ROM and strengthening :: wrist isometrics and isotonics      Thank you for your referral.    INQUIRIES  Therapist: Charlene Horton, MELECIO, OTR/L, CHT   [facility] SERVICES [address] [address]  Phone: [phone]     "

## 2021-06-10 NOTE — PROGRESS NOTES
"Hand Therapy Progress Note  6/10/2021  Reporting period: 5/6/21 to current date    Diagnosis:  Right Distal radius fracture  DOI:  1/22/21  DOS:  2/2/21  Procedure:  ORIF  Post: 4 months+    Referring MD: Bear Alejo MD @ [facility]    Next MD visit: 6/11/21    S:  Subjective changes as noted by patient: pt notes very slow progress, can turn ignition off, and one click of 2 toward the on position. Noting more mid forearm pain volar and dorsal.. Also notes some elbow pain. She is starting PT again toda   Functional changes noted by patient: Improvement in Household Chores and Driving   Response to previous treatment:  good and fair  Patient has noted adverse reaction to:   None        Pain Level Report:  Pain Level (Scale 0-10):   2/15/2021 3/22 4/22 5/6 6/10/21   At Rest 3-4 0  0 0   With Use 6-7 6-8 5 6-7 5     Pain Description:  Date 2/15/2021 3/22 4/22 5/6 6/10/21    right        Location  elbow, forearm, wrist and hand  Shoulder and elbow cubital tunnel area since the surgery has been noted       Pain Quality Aching, Dull, Numb, Sharp, Tender, Tingling and zinging Can be sharp if I reach too quickly or far, but overall better, even with a bump to the hand can be shooting, the shoulder does limit what I can do with the hand, or inadvertent wt bearing as when sleeping, or turning the hand palm up down. Pain subsides after the incident Much less often, and numbness in palm is lessening Pain is more on dorsal wrist middle and going into palm up it feels like \"cement' does not move. More swollen, fingers are more stiff. Noting a click in the wrist, ulnar aspect   Frequency intermittent, constant or daily   intermittent      Pain is worst daytime       Exacerbated by Use and typing       Relieved by heat, rest and touching and moving into a better position, OTC as needed       Progression Not better improving improving Some worse      Edema:  resolved  Wound/Scar: closed,light pink  Palpation:  []     " "Normal        [x]   Tender       [x]  Mild         []   Moderate []   Severe   Location: volar forearm  And mid palm   Sensation: IMPROVING Decreased Median Nerve distribution per pt report , reporting finger tips have good sensation, tender in the mid palm area.  ROM:  Wrist 2/15/2021 3/22 4/6 4/13 4/29 5/20 6/3 6/10/21   AROM(PROM) Right          Extension 20 35  POST: 45 37 38 41 45 42 40   Flexion 15 35  POST: 45 40 47 (55) 47 40 40 40   RD           UD           Supination Amended: 20 55 42 32 with DRUJ  55 w/o DRUJ 42 47 (70) Pre: 50  Post: 70 Pre: 55  Post: 66   Pronation Amended: 65 70  65 65 58 (68) 65 65     ROM:    Thumb  2/15/2021 2/22 3/22 4/13    AROM(PROM)  Right       MP  0/30       IP  -5/50       RAbd         PAbd  55       Retropulsion         Kapandji Opposition Scale (0-10/10)  5 7 8 8.5        ROM:   Fingers 2/15/2021 2/15/2021 3/22   AROM(PROM) Right Flex lag Flexion lag   Index MP -15 2.5 0   PIP -10     DIP      FERRARA      Long MP -20 2.5 0   PIP -15     DIP      FERRARA      Ring MP -15 2.5 0   PIP -15     DIP      FERRARA      Small MP -10 1.75 0   PIP -10     DIP      FERRARA        Strength   (Measured in pounds)  Pain Report:  + mild    ++ moderate    +++ severe    5/6/2021 5/6/2021 6/10/21   Trials Right* Left Right   1  2  3 7  10  10 33  30  34 13  10  10   Average 9 32 11     Lat Pinch 5/6/2021 5/6/2021 6/10/21   Trials Right Left RIght   1  2  3 5  6  6 11  11  10 5  5  5   Average 6 11 5     3 Pt Pinch 5/6/2021 5/6/2021 6/10/21   Trials Right Left Right   1  2  3 3  4  4 9  8  8 3  4  3   Average 4 8 3     Assessment:  Response to therapy has been improvement to:  ROM of Wrist: slow but steady progress of  Ext , Flex, Supination and Pronation  Strength:   strength, however pinch strength is not progressing  Pain:  frequency is less, intensity of pain is decreased and less tender over affected area  Self Care Skills:  \"glacially\" making progress in all tasks    Overall Assessment:  " Patient would benefit from continued therapy to achieve rehab potential  STG/LTG:  STGoals have been reviewed and progress or achievement has occurred;  see goal sheet for details and updates.  I have re-evaluated this patient and find that the nature, scope, duration and intensity of the therapy is appropriate for the medical condition of the patient.    P:  Frequency/Duration:  Recommend continuing with the current treatment plan. 1 X week, once daily  for 2 months    Recommendations for Continued Therapy  Treatment Plan:  Additions to Treatment Plan -  Modalities:  Fluidotherapy and Paraffin, progressing strengthening    Therapeutic Exercise:  AROM, AAROM, PROM, Isotonics and Isometrics  Neuromuscular re-education:  Kinesthetic Training, Proprioceptive Training and Stabilization  Manual Techniques:  Scar mobilization and Myofascial release  Self Care:  Self Care Tasks and Ergonomic Considerations      Home Program:   4/13/2021  Putty for coordination and manipulation and strengthening    Next Visit:  Progress ROM and strengthening :: wrist isometrics and isotonics

## 2021-06-12 NOTE — PROGRESS NOTES
"Colposcopy Procedure Note    Indications: Recent pap smear showed LSIL with negative HPV.  Pertinent past history includes a normal Pap with negative HPV in 2013 and ASCUS with negative HPV in 2011.  She has noted her periods starting to change, getting further apart and slightly more days of bleeding.  She has also noted some light spotting after intercourse when there is deeper penetration.    Procedure Details   BP 98/62 (Patient Site: Right Arm, Patient Position: Sitting, Cuff Size: Adult Regular)  Pulse 64  Ht 5' 4\" (1.626 m)  Wt 102 lb (46.3 kg)  LMP 07/25/2017 (Exact Date)  Breastfeeding? No  BMI 17.51 kg/m2  Body mass index is 17.51 kg/(m^2).    The reason for procedure is explained, and informed consent is obtained.  Urine hCG is negative.    Speculum is placed in the vagina and visualization of cervix is achieved. The cervix is swabbed with 3% acetic acid solution. Transformation zone is easily seen.  Green filter is applied with no abnormal vessels seen.  Acetowhite epithelium is not seen.  Adequate colposcopy.  The patient tolerated the procedure well.    Impression: normal cervix    Plan: Post procedure instructions are reviewed.  The role of HPV in causing cervical pap smear abnormalities, dysplasia, and cancer is reviewed with the patient. We discussed that the abnormal Pap is not related to her period changes, more likely that's perimenopausal change.  We discussed that if there is some inflammation on her cervix causing the abnormal Pap, that could also cause some bleeding with deeper penetration.  Neither are things to worry about.    "

## 2021-06-14 NOTE — TELEPHONE ENCOUNTER
New Appointment Needed  What is the reason for the visit:    Pre-Op Appt Request  When is the surgery? :  02/02/21  Where is the surgery?:  [facility] [place]  Who is the surgeon? :  DR. CONSTANTINO VENTURA  What type of surgery is being done?: RT BROKEN ARM  Provider Preference: PCP only, OR RECOMMENDED PROVIDER  How soon do you need to be seen?: tomorrow  Waitlist offered?: No  Okay to leave a detailed message:  Yes

## 2021-06-14 NOTE — PROGRESS NOTES
[facility]  [address] [place] [id number]  Dept: [phone]  Dept Fax: [phone]  Primary Provider: Keily Molina MD  Pre-op Performing Provider: ANU GIRALDO    PREOPERATIVE EVALUATION:  Today's date: 1/28/2021    Iman Alves is a 50 y.o. female who presents for a preoperative evaluation.    Surgical Information:  Surgery/Procedure: Radius repair (Orif RT distal radius); RT- carpal tunnel release  Surgery Location: [facility]  Surgeon: Dr. Alejo  Surgery Date: 2/02/2021  Time of Surgery: Be there at 7 am  Where patient plans to recover: At home with family  Fax number for surgical facility: [phone]    Type of Anesthesia Anticipated: to be determined    Subjective     HPI related to upcoming procedure: She fell over a dog gate and fractured her left forearm.  She is due to have surgery for definitive treatment.  It was discovered that she should have carpal tunnel release surgery at the same time    Preop Questions 1/28/2021   Have you ever had a heart attack or stroke? No   Have you ever had surgery on your heart or blood vessels, such as a stent placement, a coronary artery bypass, or surgery on an artery in your head, neck, heart, or legs? No   Do you have chest pain with activity? No   Do you have a history of  heart failure? No   Do you currently have a cold, bronchitis or symptoms of other infection? No   Do you have a cough, shortness of breath, or wheezing? No   Do you or anyone in your family have previous history of blood clots? No   Do you or does anyone in your family have a serious bleeding problem such as prolonged bleeding following surgeries or cuts? No   Have you ever had problems with anemia or been told to take iron pills? YES - Exclusively during her pregnancy   Have you had any abnormal blood loss such as black, tarry or bloody stools, or abnormal vaginal bleeding? No   Have you ever had a blood transfusion? No    Are you willing to have a blood transfusion if it is medically needed before, during, or after your surgery? Yes   Have you or any of your relatives ever had problems with anesthesia? YES - extreme nausea and vomiting   Do you have sleep apnea, excessive snoring or daytime drowsiness? No   Do you have any artifical heart valves or other implanted medical devices like a pacemaker, defibrillator, or continuous glucose monitor? No   Do you have artificial joints? No   Are you allergic to latex? No   Is there any chance that you may be pregnant? No     Health Care Directive:  Patient does not have a Health Care Directive or Living Will: Discussed advance care planning with patient; however, patient declined at this time.    Preoperative Review of :     She is not taking any opioids for pain, despite being prescribed some from the ER    Review of Systems  Constitutional, neuro, ENT, endocrine, pulmonary, cardiac, gastrointestinal, genitourinary, musculoskeletal, integument and psychiatric systems are negative, except as otherwise noted.      Patient Active Problem List    Diagnosis Date Noted     Chronic pelvic pain in female 05/29/2017     Low urine output 12/07/2015     Calculus of kidney 11/11/2015     Urinary calculus, unspecified 11/11/2015     Anomalies Of Hair      Past Medical History:   Diagnosis Date     Kidney stone     2008     Past Surgical History:   Procedure Laterality Date     MO ANESTH,REPAIR UPPER ABD HERNIA NOS      umbilical     Current Outpatient Medications   Medication Sig Dispense Refill     ascorbic acid (ASCORBIC ACID WITH ROBERT HIPS) 500 MG tablet Take 500 mg by mouth daily.       b complex vitamins tablet Take 1 tablet by mouth 2 (two) times a day.       calcium citrate (CALCITRATE) 200 mg (950 mg) tablet Take 1 tablet by mouth daily.       cholecalciferol, vitamin D3, (VITAMIN D3) 1,000 unit capsule Take 2,000 Units by mouth daily.        ENZYMES,DIGESTIVE (DIGESTIVE ENZYMES ORAL) Take 1  capsule by mouth 3 (three) times a day.       LACTOBAC CMB #3/FOS/PANTETHINE (PROBIOTIC & ACIDOPHILUS ORAL) Take 80 each by mouth.       magnesium gluconate (MAGONATE) 27.5 mg (500 mg) tablet Take 240 mg by mouth 2 (two) times a day.       OMEGA-3 FATTY ACIDS/FISH OIL (SEA-OMEGA 30 ORAL) Take 1 capsule by mouth 2 (two) times a day.       PV W-O WILTON/FERROUS FUMARATE/FA (M-VIT ORAL) Take 1 tablet by mouth daily.       HYDROcodone-acetaminophen 5-325 mg per tablet Take 1 tablet by mouth every 4 (four) hours as needed for pain. 5 tablet 0     ondansetron (ZOFRAN ODT) 4 MG disintegrating tablet Take 1 tablet (4 mg total) by mouth every 8 (eight) hours as needed for nausea. 20 tablet 0     oxyCODONE (ROXICODONE) 5 MG immediate release tablet Take 1-2 tablets (5-10 mg total) by mouth every 4 (four) hours as needed for pain. 12 tablet 0     No current facility-administered medications for this visit.        No Known Allergies    Social History     Tobacco Use     Smoking status: Never Smoker     Smokeless tobacco: Never Used   Substance Use Topics     Alcohol use: No      Family History   Problem Relation Age of Onset     Osteoporosis Mother      Prostate cancer Father         at age 70     No Medical Problems Sister      No Medical Problems Daughter      No Medical Problems Maternal Grandmother      Urolithiasis Maternal Grandfather         recurrent     Gout Maternal Grandfather      Prostate cancer Maternal Grandfather         at age 70     Cancer Maternal Grandfather         non hodgkin's at age 90     No Medical Problems Paternal Grandmother      No Medical Problems Paternal Grandfather      No Medical Problems Maternal Aunt      No Medical Problems Paternal Aunt      Urolithiasis Maternal Uncle      Urolithiasis Cousin      BRCA 1/2 Neg Hx      Breast cancer Neg Hx      Colon cancer Neg Hx      Endometrial cancer Neg Hx      Ovarian cancer Neg Hx      Social History     Substance and Sexual Activity   Drug Use No       "  Objective     Ht 5' 4.76\" (1.645 m)   Wt 106 lb (48.1 kg)   BMI 17.77 kg/m    Physical Exam    GENERAL APPEARANCE: healthy, alert and no distress     EYES: EOMI, PERRL     HENT: ear canals and TM's normal and nose and mouth without ulcers or lesions     NECK: no adenopathy, no asymmetry, masses, or scars and thyroid normal to palpation     RESP: lungs clear to auscultation - no rales, rhonchi or wheezes     BREAST: normal without masses, tenderness or nipple discharge and no palpable axillary masses or adenopathy     CV: regular rates and rhythm, normal S1 S2, no S3 or S4 and no murmur, click or rub     ABDOMEN:  soft, nontender, no HSM or masses and bowel sounds normal     MS: extremities normal- no gross deformities noted, no evidence of inflammation in joints, FROM in all extremities.     SKIN: no suspicious lesions or rashes     NEURO: Normal strength and tone, sensory exam grossly normal, mentation intact and speech normal     PSYCH: mentation appears normal. and affect normal/bright     LYMPHATICS: No cervical adenopathy    No results for input(s): HGB, PLT, INR, NA, K, CREATININE, HBA1C in the last 88319 hours.     PRE-OP Diagnostics:   Labs pending at this time. Results will be reviewed when available.  No EKG required, no history of coronary heart disease, significant arrhythmia, peripheral arterial disease or other structural heart disease.    REVISED CARDIAC RISK INDEX (RCRI)   The patient has the following serious cardiovascular risks for perioperative complications:   - No serious cardiac risks = 0 points    RCRI INTERPRETATION: 0 points: Class I (very low risk - 0.4% complication rate)         Assessment & Plan      The proposed surgical procedure is considered LOW risk.    Preoperative examination  No contraindications for planned procedure.  Of note, the patient states that she has adverse reactions while using opioid pain medications.  - Pregnancy (Beta-hCG, Qual), Urine    Closed fracture of " distal end of right radius with nonunion, unspecified fracture morphology, subsequent encounter  She fell over a dog gate and fractured her left forearm.  She is due to have surgery for definitive treatment.  It was discovered that she should have carpal tunnel release surgery at the same time    Carpal tunnel syndrome of right wrist  Plans carpal tunnel release surgery at the same time of her wrist surgery       Risks and Recommendations:  The patient has the following additional risks and recommendations for perioperative complications:   - No identified additional risk factors other than previously addressed    Patient Instructions   Hold all supplements, aspirin and NSAIDs for 7 days prior to surgery.     Follow your surgeon's direction on when to stop eating and drinking prior to surgery.    Your surgeon will be managing your pain after your surgery.      Remove all jewelry and metal piercings before your surgery.     Remove nail polish from fingers before surgery.    If you use a CPAP machine, bring this with you to surgery.            RECOMMENDATION:  APPROVAL GIVEN to proceed with proposed procedure, without further diagnostic evaluation.    Signed Electronically by: Ebenezer Baptiste CNP    Copy of this evaluation report is provided to requesting physician.    Preop [facility] Preop Guidelines    Revised Cardiac Risk Index

## 2021-06-16 ENCOUNTER — THERAPY VISIT (OUTPATIENT)
Dept: PHYSICAL THERAPY | Facility: CLINIC | Age: 51
End: 2021-06-16
Payer: COMMERCIAL

## 2021-06-16 DIAGNOSIS — M25.511 SHOULDER PAIN, RIGHT: Primary | ICD-10-CM

## 2021-06-16 PROCEDURE — 97110 THERAPEUTIC EXERCISES: CPT | Mod: GP | Performed by: PHYSICAL THERAPIST

## 2021-06-16 PROCEDURE — 97140 MANUAL THERAPY 1/> REGIONS: CPT | Mod: GP | Performed by: PHYSICAL THERAPIST

## 2021-06-17 ENCOUNTER — THERAPY VISIT (OUTPATIENT)
Dept: OCCUPATIONAL THERAPY | Facility: CLINIC | Age: 51
End: 2021-06-17
Payer: COMMERCIAL

## 2021-06-17 ENCOUNTER — HOSPITAL ENCOUNTER (OUTPATIENT)
Dept: MAMMOGRAPHY | Facility: CLINIC | Age: 51
Discharge: HOME | End: 2021-06-17

## 2021-06-17 DIAGNOSIS — Z48.89 OTHER SPECIFIED AFTERCARE FOLLOWING SURGERY: ICD-10-CM

## 2021-06-17 DIAGNOSIS — M25.531 RIGHT WRIST PAIN: Primary | ICD-10-CM

## 2021-06-17 DIAGNOSIS — M25.511 RIGHT SHOULDER PAIN, UNSPECIFIED CHRONICITY: ICD-10-CM

## 2021-06-17 DIAGNOSIS — Z12.31 VISIT FOR SCREENING MAMMOGRAM: ICD-10-CM

## 2021-06-17 DIAGNOSIS — S52.531D CLOSED COLLES' FRACTURE OF RIGHT RADIUS WITH ROUTINE HEALING: ICD-10-CM

## 2021-06-17 DIAGNOSIS — M25.631 WRIST STIFFNESS, RIGHT: ICD-10-CM

## 2021-06-17 PROCEDURE — 97035 APP MDLTY 1+ULTRASOUND EA 15: CPT | Mod: GO | Performed by: OCCUPATIONAL THERAPIST

## 2021-06-17 PROCEDURE — 97110 THERAPEUTIC EXERCISES: CPT | Mod: GO | Performed by: OCCUPATIONAL THERAPIST

## 2021-06-17 PROCEDURE — 97140 MANUAL THERAPY 1/> REGIONS: CPT | Mod: GO | Performed by: OCCUPATIONAL THERAPIST

## 2021-06-17 NOTE — PROGRESS NOTES
"SOAP note objective information for 6/17/2021.  Please refer to the daily flowsheet for treatment today, total treatment time and time spent performing 1:1 timed codes.        Diagnosis:  Right Distal radius fracture  DOI:  1/22/21  DOS:  2/2/21  Procedure:  ORIF  Post: 4 months+    Referring MD: Bear Alejo MD @ [facility]    Next MD visit: 6/11/21    S:  See flowsheet        Pain Level Report:  Pain Level (Scale 0-10):   2/15/2021 3/22 4/22 5/6 6/10/21   At Rest 3-4 0  0 0   With Use 6-7 6-8 5 6-7 5     Pain Description:  Date 2/15/2021 3/22 4/22 5/6 6/10/21    right        Location  elbow, forearm, wrist and hand  Shoulder and elbow cubital tunnel area since the surgery has been noted       Pain Quality Aching, Dull, Numb, Sharp, Tender, Tingling and zinging Can be sharp if I reach too quickly or far, but overall better, even with a bump to the hand can be shooting, the shoulder does limit what I can do with the hand, or inadvertent wt bearing as when sleeping, or turning the hand palm up down. Pain subsides after the incident Much less often, and numbness in palm is lessening Pain is more on dorsal wrist middle and going into palm up it feels like \"cement' does not move. More swollen, fingers are more stiff. Noting a click in the wrist, ulnar aspect   Frequency intermittent, constant or daily   intermittent      Pain is worst daytime       Exacerbated by Use and typing       Relieved by heat, rest and touching and moving into a better position, OTC as needed       Progression Not better improving improving Some worse      Edema:  resolved  Wound/Scar: closed,light pink  Palpation:  []     Normal        [x]   Tender       [x]  Mild         []   Moderate []   Severe   Location: volar forearm  And mid palm   Sensation: IMPROVING Decreased Median Nerve distribution per pt report , reporting finger tips have good sensation, tender in the mid palm area.  ROM:  Wrist 2/15/2021 3/22 4/6 4/13 4/29 5/20 6/3 " 6/10/21    AROM(PROM) Right           Extension 20 35  POST: 45 37 38 41 45 42 40    Flexion 15 35  POST: 45 40 47 (55) 47 40 40 40    RD            UD            Supination Amended: 20 55 42 32 with DRUJ  55 w/o DRUJ 42 47 (70) Pre: 50  Post: 70 Pre: 55  Post: 66 Pre: 565  (72)   Pronation Amended: 65 70  65 65 58 (68) 65 65      ROM:    Thumb  2/15/2021 2/22 3/22 4/13    AROM(PROM)  Right       MP  0/30       IP  -5/50       RAbd         PAbd  55       Retropulsion         Kapandji Opposition Scale (0-10/10)  5 7 8 8.5        ROM:   Fingers 2/15/2021 2/15/2021 3/22   AROM(PROM) Right Flex lag Flexion lag   Index MP -15 2.5 0   PIP -10     DIP      FERRARA      Long MP -20 2.5 0   PIP -15     DIP      FERRARA      Ring MP -15 2.5 0   PIP -15     DIP      FERRARA      Small MP -10 1.75 0   PIP -10     DIP      FERRARA        Strength   (Measured in pounds)  Pain Report:  + mild    ++ moderate    +++ severe    5/6/2021 5/6/2021 6/10/21   Trials Right* Left Right   1  2  3 7  10  10 33  30  34 13  10  10   Average 9 32 11     Lat Pinch 5/6/2021 5/6/2021 6/10/21   Trials Right Left RIght   1  2  3 5  6  6 11  11  10 5  5  5   Average 6 11 5     3 Pt Pinch 5/6/2021 5/6/2021 6/10/21   Trials Right Left Right   1  2  3 3  4  4 9  8  8 3  4  3   Average 4 8 3     Assessment:  Please refer to the daily flowsheet for treatment today, total treatment time and time spent performing 1:1 timed codes.        P:  Frequency/Duration:  Recommend continuing with the current treatment plan. 1 X week, once daily  for 2 months    Recommendations for Continued Therapy  Treatment Plan:  Additions to Treatment Plan -  Modalities:  Fluidotherapy and Paraffin, progressing strengthening    Therapeutic Exercise:  AROM, AAROM, PROM, Isotonics and Isometrics  Neuromuscular re-education:  Kinesthetic Training, Proprioceptive Training and Stabilization  Manual Techniques:  Scar mobilization and Myofascial release  Self Care:  Self Care Tasks and Ergonomic  Considerations      Home Program:   4/13/2021  Putty for coordination and manipulation and strengthening    Next Visit:  Progress ROM and strengthening :: wrist isometrics and isotonics

## 2021-06-24 ENCOUNTER — THERAPY VISIT (OUTPATIENT)
Dept: OCCUPATIONAL THERAPY | Facility: CLINIC | Age: 51
End: 2021-06-24
Payer: COMMERCIAL

## 2021-06-24 ENCOUNTER — THERAPY VISIT (OUTPATIENT)
Dept: PHYSICAL THERAPY | Facility: CLINIC | Age: 51
End: 2021-06-24
Payer: COMMERCIAL

## 2021-06-24 DIAGNOSIS — Z48.89 OTHER SPECIFIED AFTERCARE FOLLOWING SURGERY: ICD-10-CM

## 2021-06-24 DIAGNOSIS — S52.531D CLOSED COLLES' FRACTURE OF RIGHT RADIUS WITH ROUTINE HEALING: ICD-10-CM

## 2021-06-24 DIAGNOSIS — M25.531 RIGHT WRIST PAIN: ICD-10-CM

## 2021-06-24 DIAGNOSIS — M25.511 SHOULDER PAIN, RIGHT: Primary | ICD-10-CM

## 2021-06-24 DIAGNOSIS — M25.631 WRIST STIFFNESS, RIGHT: Primary | ICD-10-CM

## 2021-06-24 PROCEDURE — 97140 MANUAL THERAPY 1/> REGIONS: CPT | Mod: GP | Performed by: PHYSICAL THERAPIST

## 2021-06-24 PROCEDURE — 97140 MANUAL THERAPY 1/> REGIONS: CPT | Mod: GO | Performed by: OCCUPATIONAL THERAPIST

## 2021-06-24 PROCEDURE — 97110 THERAPEUTIC EXERCISES: CPT | Mod: GO | Performed by: OCCUPATIONAL THERAPIST

## 2021-06-24 PROCEDURE — 97110 THERAPEUTIC EXERCISES: CPT | Mod: GP | Performed by: PHYSICAL THERAPIST

## 2021-06-24 PROCEDURE — 97112 NEUROMUSCULAR REEDUCATION: CPT | Mod: GO | Performed by: OCCUPATIONAL THERAPIST

## 2021-06-24 PROCEDURE — 97035 APP MDLTY 1+ULTRASOUND EA 15: CPT | Mod: GO | Performed by: OCCUPATIONAL THERAPIST

## 2021-07-02 ENCOUNTER — AMBULATORY - HEALTHEAST (OUTPATIENT)
Dept: LAB | Facility: CLINIC | Age: 51
End: 2021-07-02

## 2021-07-02 DIAGNOSIS — S52.509A DISTAL RADIUS FRACTURE: ICD-10-CM

## 2021-07-03 NOTE — ADDENDUM NOTE
Addendum Note by Keily Castro MD at 6/15/2017  2:19 PM     Author: Keily Castro MD Service: -- Author Type: Physician    Filed: 6/15/2017  2:19 PM Encounter Date: 6/8/2017 Status: Signed    : Keily Castro MD (Physician)    Addended by: KEILY CASTRO on: 6/15/2017 02:19 PM        Modules accepted: Orders

## 2021-07-08 ENCOUNTER — THERAPY VISIT (OUTPATIENT)
Dept: OCCUPATIONAL THERAPY | Facility: CLINIC | Age: 51
End: 2021-07-08
Payer: COMMERCIAL

## 2021-07-08 DIAGNOSIS — M25.631 WRIST STIFFNESS, RIGHT: Primary | ICD-10-CM

## 2021-07-08 DIAGNOSIS — Z48.89 OTHER SPECIFIED AFTERCARE FOLLOWING SURGERY: ICD-10-CM

## 2021-07-08 DIAGNOSIS — M25.511 RIGHT SHOULDER PAIN, UNSPECIFIED CHRONICITY: ICD-10-CM

## 2021-07-08 DIAGNOSIS — S52.531D CLOSED COLLES' FRACTURE OF RIGHT RADIUS WITH ROUTINE HEALING: ICD-10-CM

## 2021-07-08 DIAGNOSIS — M25.531 RIGHT WRIST PAIN: ICD-10-CM

## 2021-07-08 PROCEDURE — 97035 APP MDLTY 1+ULTRASOUND EA 15: CPT | Mod: GO | Performed by: OCCUPATIONAL THERAPIST

## 2021-07-08 PROCEDURE — 97140 MANUAL THERAPY 1/> REGIONS: CPT | Mod: GO | Performed by: OCCUPATIONAL THERAPIST

## 2021-07-08 PROCEDURE — 97112 NEUROMUSCULAR REEDUCATION: CPT | Mod: GO | Performed by: OCCUPATIONAL THERAPIST

## 2021-07-08 PROCEDURE — 97110 THERAPEUTIC EXERCISES: CPT | Mod: GO | Performed by: OCCUPATIONAL THERAPIST

## 2021-07-08 NOTE — PROGRESS NOTES
"SOAP note objective information for 7/8/2021.  Please refer to the daily flowsheet for treatment today, total treatment time and time spent performing 1:1 timed codes.        Diagnosis:  Right Distal radius fracture  DOI:  1/22/21  DOS:  2/2/21  Procedure:  ORIF  Post: 4 months+    Referring MD: Bear Alejo MD @ [facility]    Next MD visit: 6/11/21    S:  See flowsheet        Pain Level Report:  Pain Level (Scale 0-10):   2/15/2021 3/22 4/22 5/6 6/10/21   At Rest 3-4 0  0 0   With Use 6-7 6-8 5 6-7 5     Pain Description:  Date 2/15/2021 3/22 4/22 5/6 6/10/21    right        Location  elbow, forearm, wrist and hand  Shoulder and elbow cubital tunnel area since the surgery has been noted       Pain Quality Aching, Dull, Numb, Sharp, Tender, Tingling and zinging Can be sharp if I reach too quickly or far, but overall better, even with a bump to the hand can be shooting, the shoulder does limit what I can do with the hand, or inadvertent wt bearing as when sleeping, or turning the hand palm up down. Pain subsides after the incident Much less often, and numbness in palm is lessening Pain is more on dorsal wrist middle and going into palm up it feels like \"cement' does not move. More swollen, fingers are more stiff. Noting a click in the wrist, ulnar aspect   Frequency intermittent, constant or daily   intermittent      Pain is worst daytime       Exacerbated by Use and typing       Relieved by heat, rest and touching and moving into a better position, OTC as needed       Progression Not better improving improving Some worse      Edema:  resolved  Wound/Scar: closed,light pink  Palpation:  []     Normal        [x]   Tender       [x]  Mild         []   Moderate []   Severe   Location: volar forearm  And mid palm   Sensation: IMPROVING Decreased Median Nerve distribution per pt report , reporting finger tips have good sensation, tender in the mid palm area.  ROM:  Wrist 2/15/2021 3/22 4/6 4/13 4/29 5/20 6/3 " 6/10/21 6/17 7/8   AROM(PROM) Right            Extension 20 35  POST: 45 37 38 41 45 42 40  55   Flexion 15 35  POST: 45 40 47 (55) 47 40 40 40  45  p trt: 58   RD             UD             Supination Amended: 20 55 42 32 with DRUJ  55 w/o DRUJ 42 47 (70) Pre: 50  Post: 70 Pre: 55  Post: 66 Pre: 55  (72) 75   Pronation Amended: 65 70  65 65 58 (68) 65 65  75     ROM:    Thumb  2/15/2021 2/22 3/22 4/13 7/8/21   AROM(PROM)  Right       MP  0/30       IP  -5/50       RAbd         PAbd  55       Retropulsion         Kapandji Opposition Scale (0-10/10)  5 7 8 8.5 9.0       ROM:   Fingers 2/15/2021 2/15/2021 3/22   AROM(PROM) Right Flex lag Flexion lag   Index MP -15 2.5 0   PIP -10     DIP      FERRARA      Long MP -20 2.5 0   PIP -15     DIP      FERRARA      Ring MP -15 2.5 0   PIP -15     DIP      FERRARA      Small MP -10 1.75 0   PIP -10     DIP      FERRARA        Strength   (Measured in pounds)  Pain Report:  + mild    ++ moderate    +++ severe    5/6/2021 5/6/2021 6/10/21 7/8    Trials Right* Left Right R L   1  2  3 7  10  10 33  30  34 13  10  10 21 37   Average 9 32 11       Lat Pinch 5/6/2021 5/6/2021 6/10/21   Trials Right Left RIght   1  2  3 5  6  6 11  11  10 5  5  5   Average 6 11 5     3 Pt Pinch 5/6/2021 5/6/2021 6/10/21   Trials Right Left Right   1  2  3 3  4  4 9  8  8 3  4  3   Average 4 8 3     Assessment:  Please refer to the daily flowsheet for treatment today, total treatment time and time spent performing 1:1 timed codes.        P:  Frequency/Duration:  Recommend continuing with the current treatment plan. 1 X week, once daily  for 2 months    Recommendations for Continued Therapy  Treatment Plan:  Additions to Treatment Plan -  Modalities:  Fluidotherapy and Paraffin, progressing strengthening    Therapeutic Exercise:  AROM, AAROM, PROM, Isotonics and Isometrics  Neuromuscular re-education:  Kinesthetic Training, Proprioceptive Training and Stabilization  Manual Techniques:  Scar mobilization and  Myofascial release  Self Care:  Self Care Tasks and Ergonomic Considerations      Home Program:   7/8/2021  Return to isometrics wrist  CST wt bearing  Hands on table for antecubital turning  NM strap for wrist flexion/extn for prox row motion      Next Visit:  Progress  Back to strengthening :: wrist isometrics and isotonics

## 2021-07-13 ENCOUNTER — RECORDS - HEALTHEAST (OUTPATIENT)
Dept: ADMINISTRATIVE | Facility: CLINIC | Age: 51
End: 2021-07-13

## 2021-07-19 ENCOUNTER — OFFICE VISIT (OUTPATIENT)
Dept: INTERNAL MEDICINE | Facility: CLINIC | Age: 51
End: 2021-07-19
Payer: COMMERCIAL

## 2021-07-19 VITALS
OXYGEN SATURATION: 100 % | HEART RATE: 59 BPM | SYSTOLIC BLOOD PRESSURE: 98 MMHG | WEIGHT: 98 LBS | BODY MASS INDEX: 16.33 KG/M2 | DIASTOLIC BLOOD PRESSURE: 60 MMHG | HEIGHT: 65 IN

## 2021-07-19 DIAGNOSIS — Z13.29 SCREENING FOR ENDOCRINE, NUTRITIONAL, METABOLIC AND IMMUNITY DISORDER: ICD-10-CM

## 2021-07-19 DIAGNOSIS — Z76.89 ENCOUNTER TO ESTABLISH CARE: ICD-10-CM

## 2021-07-19 DIAGNOSIS — E56.9 VITAMIN DEFICIENCY: ICD-10-CM

## 2021-07-19 DIAGNOSIS — M25.511 CHRONIC RIGHT SHOULDER PAIN: ICD-10-CM

## 2021-07-19 DIAGNOSIS — Z13.0 SCREENING FOR ENDOCRINE, NUTRITIONAL, METABOLIC AND IMMUNITY DISORDER: ICD-10-CM

## 2021-07-19 DIAGNOSIS — Z13.21 SCREENING FOR ENDOCRINE, NUTRITIONAL, METABOLIC AND IMMUNITY DISORDER: ICD-10-CM

## 2021-07-19 DIAGNOSIS — S52.501A CLOSED FRACTURE OF DISTAL END OF RIGHT RADIUS, UNSPECIFIED FRACTURE MORPHOLOGY, INITIAL ENCOUNTER: ICD-10-CM

## 2021-07-19 DIAGNOSIS — D72.9 ABNORMAL WBC COUNT: Primary | ICD-10-CM

## 2021-07-19 DIAGNOSIS — Z00.00 ENCOUNTER FOR ANNUAL PHYSICAL EXAM: ICD-10-CM

## 2021-07-19 DIAGNOSIS — Z11.51 SPECIAL SCREENING EXAMINATION FOR HUMAN PAPILLOMAVIRUS (HPV): ICD-10-CM

## 2021-07-19 DIAGNOSIS — R63.6 UNDERWEIGHT: ICD-10-CM

## 2021-07-19 DIAGNOSIS — G89.29 CHRONIC RIGHT SHOULDER PAIN: ICD-10-CM

## 2021-07-19 DIAGNOSIS — Z13.228 SCREENING FOR ENDOCRINE, NUTRITIONAL, METABOLIC AND IMMUNITY DISORDER: ICD-10-CM

## 2021-07-19 PROBLEM — Q84.2 SPECIFIED CONGENITAL ANOMALIES OF HAIR: Status: ACTIVE | Noted: 2021-07-19

## 2021-07-19 PROBLEM — R10.2 CHRONIC PELVIC PAIN IN FEMALE: Status: ACTIVE | Noted: 2017-05-29

## 2021-07-19 LAB
ALBUMIN SERPL-MCNC: 4.3 G/DL (ref 3.5–5)
ALP SERPL-CCNC: 65 U/L (ref 45–120)
ALT SERPL W P-5'-P-CCNC: 18 U/L (ref 0–45)
ANION GAP SERPL CALCULATED.3IONS-SCNC: 13 MMOL/L (ref 5–18)
AST SERPL W P-5'-P-CCNC: 23 U/L (ref 0–40)
BASOPHILS # BLD AUTO: 0 10E3/UL (ref 0–0.2)
BASOPHILS NFR BLD AUTO: 1 %
BILIRUB SERPL-MCNC: 0.5 MG/DL (ref 0–1)
BUN SERPL-MCNC: 18 MG/DL (ref 8–22)
CALCIUM SERPL-MCNC: 9.4 MG/DL (ref 8.5–10.5)
CHLORIDE BLD-SCNC: 102 MMOL/L (ref 98–107)
CHOLEST SERPL-MCNC: 204 MG/DL
CO2 SERPL-SCNC: 26 MMOL/L (ref 22–31)
CREAT SERPL-MCNC: 0.99 MG/DL (ref 0.6–1.1)
EOSINOPHIL # BLD AUTO: 0.1 10E3/UL (ref 0–0.7)
EOSINOPHIL NFR BLD AUTO: 3 %
ERYTHROCYTE [DISTWIDTH] IN BLOOD BY AUTOMATED COUNT: 12.4 % (ref 10–15)
FASTING STATUS PATIENT QL REPORTED: YES
GFR SERPL CREATININE-BSD FRML MDRD: 66 ML/MIN/1.73M2
GLUCOSE BLD-MCNC: 80 MG/DL (ref 70–125)
HCT VFR BLD AUTO: 38.2 % (ref 35–47)
HDLC SERPL-MCNC: 75 MG/DL
HGB BLD-MCNC: 12.8 G/DL (ref 11.7–15.7)
IMM GRANULOCYTES # BLD: 0 10E3/UL
IMM GRANULOCYTES NFR BLD: 0 %
LDLC SERPL CALC-MCNC: 118 MG/DL
LYMPHOCYTES # BLD AUTO: 0.9 10E3/UL (ref 0.8–5.3)
LYMPHOCYTES NFR BLD AUTO: 31 %
MCH RBC QN AUTO: 30 PG (ref 26.5–33)
MCHC RBC AUTO-ENTMCNC: 33.5 G/DL (ref 31.5–36.5)
MCV RBC AUTO: 90 FL (ref 78–100)
MONOCYTES # BLD AUTO: 0.3 10E3/UL (ref 0–1.3)
MONOCYTES NFR BLD AUTO: 12 %
NEUTROPHILS # BLD AUTO: 1.6 10E3/UL (ref 1.6–8.3)
NEUTROPHILS NFR BLD AUTO: 54 %
PLATELET # BLD AUTO: 161 10E3/UL (ref 150–450)
POTASSIUM BLD-SCNC: 3.6 MMOL/L (ref 3.5–5)
PROT SERPL-MCNC: 6.6 G/DL (ref 6–8)
RBC # BLD AUTO: 4.27 10E6/UL (ref 3.8–5.2)
SODIUM SERPL-SCNC: 141 MMOL/L (ref 136–145)
TRIGL SERPL-MCNC: 53 MG/DL
WBC # BLD AUTO: 2.9 10E3/UL (ref 4–11)

## 2021-07-19 PROCEDURE — 85025 COMPLETE CBC W/AUTO DIFF WBC: CPT | Performed by: NURSE PRACTITIONER

## 2021-07-19 PROCEDURE — 80053 COMPREHEN METABOLIC PANEL: CPT | Performed by: NURSE PRACTITIONER

## 2021-07-19 PROCEDURE — 99213 OFFICE O/P EST LOW 20 MIN: CPT | Mod: 25 | Performed by: NURSE PRACTITIONER

## 2021-07-19 PROCEDURE — 82306 VITAMIN D 25 HYDROXY: CPT | Performed by: NURSE PRACTITIONER

## 2021-07-19 PROCEDURE — 87624 HPV HI-RISK TYP POOLED RSLT: CPT | Performed by: NURSE PRACTITIONER

## 2021-07-19 PROCEDURE — 80061 LIPID PANEL: CPT | Performed by: NURSE PRACTITIONER

## 2021-07-19 PROCEDURE — 99396 PREV VISIT EST AGE 40-64: CPT | Performed by: NURSE PRACTITIONER

## 2021-07-19 PROCEDURE — G0123 SCREEN CERV/VAG THIN LAYER: HCPCS | Performed by: NURSE PRACTITIONER

## 2021-07-19 PROCEDURE — 36415 COLL VENOUS BLD VENIPUNCTURE: CPT | Performed by: NURSE PRACTITIONER

## 2021-07-19 ASSESSMENT — MIFFLIN-ST. JEOR: SCORE: 1047.47

## 2021-07-19 NOTE — PROGRESS NOTES
SUBJECTIVE:   CC: Iman Alves is an 50 year old woman who presents for preventive health visit.       Patient has been advised of split billing requirements and indicates understanding: Yes  The patient presents today for her annual exam and to establish care. She reports previously being a patient of Dr. Zaidi. She reports needing a special blood test, a Blanche test for metals. She fractured her right wrist and had a titanium plate placed, and it is not healing like it should be. She reports that her surgeon wants to remove the plate, and she does not want to do this unless she is allergic.    She also reports currently having a right sided frozen shoulder. She continues to work on exercises.    She reports a past surgical history of an umbilical cyst removal. She also had a modified abdominoplasty that was not successful for  abdominal muscles after having her son.     She reports her Mom has a history of hypertension. Her Father had prostate cancer, now has aggressive mantle cell lymphoma.    Her maternal grandfather also had prostate cancer and non-hodgkin's lymphoma, passed at age 93.     She denies any tobacco, drug, or alcohol use.    She is . Son is doing well.    She works as a .    She is exercising on a daily basis.     Healthy Habits:     Getting at least 3 servings of Calcium per day:  NO    Bi-annual eye exam:  NO    Dental care twice a year:  Yes    Sleep apnea or symptoms of sleep apnea:  None    Diet:  Gluten-free/reduced and Other    Frequency of exercise:  6-7 days/week    Duration of exercise:  30-45 minutes    Taking medications regularly:  Not Applicable    Medication side effects:  Not applicable    PHQ-2 Total Score: 1    Additional concerns today:  Yes      Today's PHQ-2 Score:   PHQ-2 ( 1999 Pf[employer]) 7/16/2021   Q1: Little interest or pleasure in doing things 0   Q2: Feeling down, depressed or hopeless 1   PHQ-2 Score 1   Q1: Little interest or  pleasure in doing things Not at all   Q2: Feeling down, depressed or hopeless Several days   PHQ-2 Score 1       Abuse: Current or Past (Physical, Sexual or Emotional) - NO  Do you feel safe in your environment? YES    Have you ever done Advance Care Planning? (For example, a Health Directive, POLST, or a discussion with a medical provider or your loved ones about your wishes): Yes, patient states has an Advance Care Planning document and will bring a copy to the clinic.    Social History     Tobacco Use     Smoking status: Never Smoker     Smokeless tobacco: Never Used   Substance Use Topics     Alcohol use: No     If you drink alcohol do you typically have >3 drinks per day or >7 drinks per week? Not applicable    Alcohol Use 2021   Prescreen: >3 drinks/day or >7 drinks/week? -   Prescreen: >3 drinks/day or >7 drinks/week? Not Applicable   No flowsheet data found.    Reviewed orders with patient.  Reviewed health maintenance and updated orders accordingly - Yes  Lab work is in process    Breast Cancer Screening:  Any new diagnosis of family breast, ovarian, or bowel cancer? No    FHS-7: No flowsheet data found.  click delete button to remove this line now  Mammogram Screening: Recommended annual mammography  Pertinent mammograms are reviewed under the imaging tab.    History of abnormal Pap smear: YES - updated in Problem List and Health Maintenance accordingly, last PAP was 2019 ASCUS, negative HPV.       Reviewed and updated as needed this visit by clinical staff  Tobacco  Allergies  Meds   Med Hx  Surg Hx  Fam Hx  Soc Hx      Reviewed and updated as needed this visit by Provider  Tobacco  Allergies  Meds   Med Hx  Surg Hx  Fam Hx  Soc Hx       Past Medical History:   Diagnosis Date     Kidney stone           History reviewed. No pertinent surgical history.  OB History    Para Term  AB Living   1 1 1 0 0 0   SAB TAB Ectopic Multiple Live Births   0 0 0 0 0      # Outcome  "Date GA Lbr Jeremy/2nd Weight Sex Delivery Anes PTL Lv   1 Term                Review of Systems  CONSTITUTIONAL: NEGATIVE for fever, chills, change in weight  INTEGUMENTARU/SKIN: NEGATIVE for worrisome rashes, moles or lesions  EYES: NEGATIVE for vision changes or irritation  ENT: NEGATIVE for ear, mouth and throat problems  RESP: NEGATIVE for significant cough or SOB  BREAST: NEGATIVE for masses, tenderness or discharge  CV: NEGATIVE for chest pain, palpitations or peripheral edema  GI: NEGATIVE for nausea, abdominal pain, heartburn, or change in bowel habits  : NEGATIVE for unusual urinary or vaginal symptoms. Periods are regular.  MUSCULOSKELETAL: NEGATIVE for significant arthralgias or myalgia  NEURO: NEGATIVE for weakness, dizziness or paresthesias  PSYCHIATRIC: NEGATIVE for changes in mood or affect     OBJECTIVE:   BP 98/60   Pulse 59   Ht 1.638 m (5' 4.5\")   Wt 44.5 kg (98 lb)   SpO2 100%   BMI 16.56 kg/m    Physical Exam  GENERAL: healthy, alert and no distress  EYES: Eyes grossly normal to inspection, PERRL and conjunctivae and sclerae normal  HENT: ear canals and TM's normal, nose and mouth without ulcers or lesions  NECK: no adenopathy, no asymmetry, masses, or scars and thyroid normal to palpation  RESP: lungs clear to auscultation - no rales, rhonchi or wheezes  BREAST: normal without masses, tenderness or nipple discharge and no palpable axillary masses or adenopathy  CV: regular rate and rhythm, normal S1 S2, no S3 or S4, no murmur, click or rub, no peripheral edema and peripheral pulses strong  ABDOMEN: soft, nontender, no hepatosplenomegaly, no masses and bowel sounds normal  MS: no gross musculoskeletal defects noted, no edema  SKIN: no suspicious lesions or rashes  NEURO: Normal strength and tone, mentation intact and speech normal  PSYCH: mentation appears normal, affect normal/bright    Diagnostic Test Results: Previous cologuard, Mammogram.   Labs reviewed in Epic; previous PAP and blood " "work.     ASSESSMENT/PLAN:   Iman was seen today for physical and establish care.    Diagnoses and all orders for this visit:    Encounter for annual physical exam: Completed today. PAP obtained.     Encounter to establish care: Care established today.     Distal radius fracture: Hx of this. Had a titanium plate placed, is not healing well. She has allergies to surgical steel and nickel. Surgeon ordered a blood metal profile.   -     Blood metal panel    Chronic right shoulder pain: Hx of frozen right shoulder.     Vitamin deficiency: Will check Vitamin D level today.   -     Vitamin D Deficiency; Future    Underweight: BMI today was 16.56. She continues to exercise on a daily basis.     Screening for endocrine, nutritional, metabolic and immunity disorder  -     CBC with platelets and differential; Future  -     Comprehensive metabolic panel (BMP + Alb, Alk Phos, ALT, AST, Total. Bili, TP); Future  -     Lipid Profile (Chol, Trig, HDL, LDL calc); Future  -     CBC with platelets and differential  -     Comprehensive metabolic panel (BMP + Alb, Alk Phos, ALT, AST, Total. Bili, TP)  -     Lipid Profile (Chol, Trig, HDL, LDL calc)    Special screening examination for human papillomavirus (HPV)  -     Pap imaged thin layer screen with HPV - recommended age 30 - 65 years    Other orders  -     REVIEW OF HEALTH MAINTENANCE PROTOCOL ORDERS        Patient has been advised of split billing requirements and indicates understanding: Yes  COUNSELING:  Reviewed preventive health counseling, as reflected in patient instructions       Healthy diet/nutrition    Estimated body mass index is 16.56 kg/m  as calculated from the following:    Height as of this encounter: 1.638 m (5' 4.5\").    Weight as of this encounter: 44.5 kg (98 lb).        She reports that she has never smoked. She has never used smokeless tobacco.      Counseling Resources:  ATP IV Guidelines  Pooled Cohorts Equation Calculator  Breast Cancer Risk " Calculator  BRCA-Related Cancer Risk Assessment: FHS-7 Tool  FRAX Risk Assessment  ICSI Preventive Guidelines  Dietary Guidelines for Americans, 2010  USDA's MyPlate  ASA Prophylaxis  Lung CA Screening    Nanci Friedman CNP  [facility]

## 2021-07-19 NOTE — PATIENT INSTRUCTIONS
Think about getting your shingrix shots for shingles.    Your labs are processing at this time, I will release results on my chart once they are back.    You are due for Cologuard follow up 06/2022    Mammogram is due 06/2022 or 06/2023, 1-2 years.    I will see you back next year for a recheck, before then if anything comes up.   Patient Education     Prevention Guidelines, Women Ages 50 to 64  Screening tests and vaccines are an important part of managing your health. A screening test is done to find possible disorders or diseases in people who don't have any symptoms. The goal is to find a disease early so lifestyle changes can be made and you can be watched more closely to reduce the risk of disease, or to detect it early enough to treat it most effectively. Screening tests are not considered diagnostic, but are used to determine if more testing is needed. Health counseling is essential, too. Below are guidelines for these, for women ages 50 to 64. Keep in mind that screening advice varies among expert groups. Talk with your healthcare provider about which tests are best for you and to make sure you re up to date on what you need.   Screening  Who needs it  How often    Type 2 diabetes or prediabetes  All women beginning at age 45 and women without symptoms at any age who are overweight or obese and have 1 or more additional risk factors for diabetes.  At  least every 3 years    Type 2 diabetes or prediabetes  All women diagnosed with gestational diabetes  Lifelong testing at least every 3 years    Type 2 diabetes All women with prediabetes  Every year   Unhealthy alcohol use  All women in this age group  At routine exams   Blood pressure All women in this age group  Yearly checkup if your blood pressure is normal   Normal blood pressure is less than 120/80 mm Hg   If your blood pressure reading is higher than normal, follow the advice of your healthcare provider    Breast cancer All women at average risk in  this age group  Yearly mammogram should be done until age 54. At age 55, you can switch to every other year or choose to continue yearly.   All women should know how their breasts normally look and feel and know the possible benefits and risks of breast cancer screening with mammograms.    Cervical cancer All women in this age group, except women who have had a complete hysterectomy  Pap test every 3 years or Pap test with human papillomavirus (HPV) test every 5 years    Chlamydia Women who are sexually active and at increased risk for infection  At yearly routine exams    Colorectal cancer All women at average risk in this age group  Multiple tests are available and are used at different times. Possible tests include:     Flexible sigmoidoscopy every 5 years, or    Colonoscopy every 10 years, or    CT colonography (virtual colonoscopy) every 5 years, or    Yearly fecal occult blood test, or    Yearly fecal immunochemical test every year, or    Stool DNA test, every 3 years  If you choose a test other than a colonoscopy and have an abnormal test result, you will need to follow up with a colonoscopy. Screening advice varies among expert groups. Talk with your healthcare provider about which tests are best for you.   Some people should be screened using a different schedule because of their personal or family health history. Talk with your healthcare provider about your health history.    Depression All women in this age group  At routine exams   Gonorrhea Sexually active women at increased risk for infection  At yearly routine exams    Hepatitis C Anyone at increased risk; 1 time for those born between 1945 and 1965  At routine exams   High cholesterol or triglycerides  All women in this age group who are at risk for coronary artery disease  At least every 5 years; talk with your healthcare provider about your risk    HIV All women At least once during your lifetime; yearly if at high risk    Lung cancer Women  between the ages of 55 to 74 who are in fairly good health and are at higher risk for lung cancer         Currently smoke or have  quit within past 15 years         30-pack-year smoking history  , Eligibility criteria and age limit (possibly up to age 80) may vary across major organizations  Yearly lung cancer screening with a low-dose CT scan (LDCT) Talk with your healthcare provider for more information.    Obesity All women in this age group  At yearly routine exams    Osteoporosis Women who are postmenopausal  Talk with your healthcare provider    Syphilis Women at increased risk for infection  At routine exams; talk with your healthcare provider    Tuberculosis Women at increased risk for infection  Talk with your healthcare provider    Vision All women in this age group  Talk with your healthcare provider    Vaccine Who needs it How often   Chickenpox (varicella)  All women in this age group who have no record of this infection or vaccine  2 doses; the second dose should be given at least 4 weeks after the first dose    Hepatitis A Women at increased risk for infection  2 or 3 doses (depending on the vaccine) given at least 6 months apart; talk with your healthcare provider    Hepatitis B Women at increased risk for infection  2 or 3 doses (depending on the vaccine) ; second dose should be given 1 month after the first dose; if a third dose, it should be given at least 2 months after the second dose and at least 4 months after the first dose; talk with your healthcare provider    Haemophilus influenzae Type B (HIB)  Women at increased risk for infection  1 or 3 doses; talk with your healthcare provider    Influenza (flu) All women in this age group  Once a year   Measles, mumps, rubella (MMR)  Women in this age group born in 1957 or later who have no record of these infections or vaccines  1 or 2doses   Meningococcal Women at increased risk for infection  1 or more doses; talk with your healthcare provider     Pneumococcal conjugate vaccine (PCV13) and pneumococcal polysaccharide vaccine (PPSV23)  Women at increased risk for infection  PCV13: 1 dose ages 19 to 64 (protects against 13 types of pneumococcal bacteria)   PPSV23: 1 or 2 doses through age 64(protects against 23 types of pneumococcal bacteria)   Talk with your healthcare provider   Tetanus/diphtheria/pertussis (Td/Tdap) booster All women in this age group  Td every 10 years, or a 1-time dose of Tdap instead of a Td booster after age 18, then Td every 10 years    Recombinant zoster vaccine (RZV)  All women ages 50 and older  If 2 doses; the 2nd dose is given 2 to 6 months after the first. This is given even if you've had shingles before or had a previous zoster live vaccine.    Counseling Who needs it How often   BRCA gene mutation testing for breast and ovarian cancer susceptibility  Women with increased risk for having gene mutation  When your risk is known; talk with your healthcare provider    Breast cancer and chemoprevention  Women at high risk for breast cancer  When your risk is known; talk with your healthcare provider    Diet and exercise Women who are overweight or obese  When diagnosed, and then at routine exams    Sexually transmitted infection prevention  Women at increased risk for infection  At routine exams; talk with your healthcare provider    Use of daily aspirin  Women ages 50 and up who are at high risk for cardiovascular health problems and not at increased risk for bleeding as identified by their healthcare provider  When your risk is known; talk with your healthcare provider    Use of tobacco and the health effects it can cause  All women in this age group  Every exam   Michael last reviewed this educational content on 6/1/2020 2000-2021 [employer]. All rights reserved. This information is not intended as a substitute for professional medical care. Always follow your healthcare professional's instructions.

## 2021-07-20 LAB — DEPRECATED CALCIDIOL+CALCIFEROL SERPL-MC: 65 UG/L (ref 30–80)

## 2021-07-21 ENCOUNTER — RECORDS - HEALTHEAST (OUTPATIENT)
Dept: ADMINISTRATIVE | Facility: CLINIC | Age: 51
End: 2021-07-21

## 2021-07-21 ENCOUNTER — MYC MEDICAL ADVICE (OUTPATIENT)
Dept: INTERNAL MEDICINE | Facility: CLINIC | Age: 51
End: 2021-07-21

## 2021-07-22 ENCOUNTER — THERAPY VISIT (OUTPATIENT)
Dept: PHYSICAL THERAPY | Facility: CLINIC | Age: 51
End: 2021-07-22
Payer: COMMERCIAL

## 2021-07-22 ENCOUNTER — THERAPY VISIT (OUTPATIENT)
Dept: OCCUPATIONAL THERAPY | Facility: CLINIC | Age: 51
End: 2021-07-22
Payer: COMMERCIAL

## 2021-07-22 DIAGNOSIS — M25.631 WRIST STIFFNESS, RIGHT: ICD-10-CM

## 2021-07-22 DIAGNOSIS — S52.531D CLOSED COLLES' FRACTURE OF RIGHT RADIUS WITH ROUTINE HEALING: ICD-10-CM

## 2021-07-22 DIAGNOSIS — M25.511 RIGHT SHOULDER PAIN, UNSPECIFIED CHRONICITY: ICD-10-CM

## 2021-07-22 DIAGNOSIS — M25.531 RIGHT WRIST PAIN: Primary | ICD-10-CM

## 2021-07-22 DIAGNOSIS — M25.511 SHOULDER PAIN, RIGHT: Primary | ICD-10-CM

## 2021-07-22 DIAGNOSIS — Z48.89 OTHER SPECIFIED AFTERCARE FOLLOWING SURGERY: ICD-10-CM

## 2021-07-22 PROCEDURE — 97035 APP MDLTY 1+ULTRASOUND EA 15: CPT | Mod: GO | Performed by: OCCUPATIONAL THERAPIST

## 2021-07-22 PROCEDURE — 97110 THERAPEUTIC EXERCISES: CPT | Mod: GP | Performed by: PHYSICAL THERAPIST

## 2021-07-22 PROCEDURE — 97140 MANUAL THERAPY 1/> REGIONS: CPT | Mod: GP | Performed by: PHYSICAL THERAPIST

## 2021-07-22 PROCEDURE — 97140 MANUAL THERAPY 1/> REGIONS: CPT | Mod: GO | Performed by: OCCUPATIONAL THERAPIST

## 2021-07-22 PROCEDURE — 97110 THERAPEUTIC EXERCISES: CPT | Mod: GO | Performed by: OCCUPATIONAL THERAPIST

## 2021-07-22 NOTE — PROGRESS NOTES
"Hand Therapy Progress Note  7/22/2021  Reporting period: 6/10/21 to current date    Diagnosis:  Right Distal radius fracture  DOI:  1/22/21  DOS:  2/2/21  Procedure:  ORIF  Post: 5 months+    Referring MD: Bera Alejo MD @ [facility]    Next MD visit: 8/5/21    S:  Subjective changes as noted by patient: Pt noted some reaction to the ROM gain from last visit, with pain in dorsal wrist that hindered progression of wrist motion this week. Now has some residual dorsal foream pain with wrist extension, reports it feels like a \"creaking\" sound. Able to sweep outdoors this week using more arm force; Can do more things with ease, noting less shoulder pain and slightly more shoulder motion.   Functional changes noted by patient: Improvement in Self Care Tasks (dressing, bathing), Work Tasks and Recreational Activities  Response to previous treatment:  good  Patient has noted adverse reaction to:   None        Pain Level Report:  Pain Level (Scale 0-10):   2/15/2021 3/22 4/22 5/6 6/10/21 7/22   At Rest 3-4 0  0 0 0   With Use 6-7 6-8 5 6-7 5 Usually about 3-4  Was up to 7 in the couple of last weeks     Pain Description:  Date 2/15/2021 3/22 4/22 5/6 6/10/21     right         Location  elbow, forearm, wrist and hand  Shoulder and elbow cubital tunnel area since the surgery has been noted        Pain Quality Aching, Dull, Numb, Sharp, Tender, Tingling and zinging Can be sharp if I reach too quickly or far, but overall better, even with a bump to the hand can be shooting, the shoulder does limit what I can do with the hand, or inadvertent wt bearing as when sleeping, or turning the hand palm up down. Pain subsides after the incident Much less often, and numbness in palm is lessening Pain is more on dorsal wrist middle and going into palm up it feels like \"cement' does not move. More swollen, fingers are more stiff. Noting a click in the wrist, ulnar aspect Higher due to after new motion gained at last clinic " visit. Calmed down    Frequency intermittent, constant or daily   intermittent       Pain is worst daytime        Exacerbated by Use and typing        Relieved by heat, rest and touching and moving into a better position, OTC as needed        Progression Not better improving improving Some worse  Some increase noted, but this seems to be decreasing recently     Edema:  7/22/2021: over intersection area, see below in palpation section  Wound/Scar: light to no pink noted at wrist, and palm   Palpation:  []     Normal        [x]   Tender       [x]  Mild         []   Moderate []   Severe   Location: dorsal distal forearm, over intersection between ECRB tendon and EPB/APL mm bellies. No pain with resisted ECRB or EPB/APL, but tender and mild edema noted, with crepitus noted upon AROM   Sensation: IMPROVING Decreased Median Nerve distribution per pt report , reporting finger tips have good sensation, tender in the mid palm area.  ROM:  Wrist 2/15/2021 3/22 4/13 5/20 6/10/21 6/17 7/8 7/22   AROM(PROM) Right          Extension 20 35  POST: 45 38 45 40  55 55+   Flexion 15 35  POST: 45 47 (55) 40 40  45  p trt: 58 52+   RD           UD           Supination Amended: 20 55 32 with DRUJ  55 w/o DRUJ 47 (70) Pre: 55  Post: 66 Pre: 55  (72) 75 70+   Pronation Amended: 65 70 65 58 (68) 65  75 75     ROM:    Thumb  2/15/2021 2/22 3/22 4/13 7/8/21   AROM(PROM)  Right       MP  0/30       IP  -5/50       RAbd         PAbd  55       Retropulsion         Kapandji Opposition Scale (0-10/10)  5 7 8 8.5 9.0       ROM:   Fingers 2/15/2021 2/15/2021 3/22   AROM(PROM) Right Flex lag Flexion lag   Index MP -15 2.5 0   PIP -10     DIP      FERRARA      Long MP -20 2.5 0   PIP -15     DIP      FERRARA      Ring MP -15 2.5 0   PIP -15     DIP      FERRARA      Small MP -10 1.75 0   PIP -10     DIP      FERRARA        Strength   (Measured in pounds)  Pain Report:  + mild    ++ moderate    +++ severe    5/6/2021 5/6/2021 6/10/21 7/8  7/22/21    Trials Right*  Left Right R LL R L   1  2  3 7  10  10 33  30  34 13  10  10 21 37 20  20  20 35   Average 9 32 11   20      Lat Pinch 5/6/2021 5/6/2021 6/10/21 7/22   Trials Right Left RIght R   1  2  3 5  6  6 11  11  10 5  5  5 9  10  10   Average 6 11 5 10     3 Pt Pinch 5/6/2021 5/6/2021 6/10/21 7/22/21   Trials Right Left Right R   1  2  3 3  4  4 9  8  8 3  4  3 7  8  8   Average 4 8 3 8     Assessment:  Response to therapy has been improvement to:  ROM of Wrist:  All Planes  Strength:   and pinch  Pain:  frequency is less, duration of pain is decreased and intensity of pain has increased  Self Care Skills:  Improved use of RUE in all areas of occupation, still improving    Overall Assessment: NOTE: new sx of dorsal wrist pain are 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  Patient would benefit from continued therapy to achieve rehab potential  STG/LTG:  STGoals have been reviewed and progress or achievement has occurred;  see goal sheet for details and updates.  I have re-evaluated this patient and find that the nature, scope, duration and intensity of the therapy is appropriate for the medical condition of the patient.    P:  Frequency/Duration:  Recommend continuing with the current treatment plan. 2 X a month, once daily  for 2 months  Recommendations for Continued Therapy  Treatment Plan:   Additions to Treatment Plan -  Modalities:  US  Deletions to Treatment Plan -  Modalities:  Fluidotherapy and Paraffin  Treatment Plan:   Modalities:  Fluidotherapy and Paraffin, progressing strengthening  Therapeutic Exercise:  AROM, AAROM, PROM, Isotonics and Isometrics  Neuromuscular re-education:  Kinesthetic Training, Proprioceptive Training and Stabilization  Manual Techniques:  Scar mobilization and Myofascial release  Self Care:  Self Care Tasks and Ergonomic Considerations    Home Program:    7/8/2021  Return to isometrics wrist  CST wt bearing  Hands on table for antecubital turning  NM strap for wrist flexion/extn for prox row motion  7/22/2021  Continue with CST wrist taping, alternate wrist strengthening: isometrics, isotonics with wts or theraband      Next Visit:  Progress strengthening: wrist isometrics and isotonics

## 2021-07-27 LAB
HUMAN PAPILLOMA VIRUS 16 DNA: NEGATIVE
HUMAN PAPILLOMA VIRUS 18 DNA: NEGATIVE
HUMAN PAPILLOMA VIRUS FINAL DIAGNOSIS: NORMAL
HUMAN PAPILLOMA VIRUS OTHER HR: NEGATIVE

## 2021-07-28 ENCOUNTER — MYC MEDICAL ADVICE (OUTPATIENT)
Dept: INTERNAL MEDICINE | Facility: CLINIC | Age: 51
End: 2021-07-28

## 2021-07-28 ENCOUNTER — PATIENT OUTREACH (OUTPATIENT)
Dept: FAMILY MEDICINE | Facility: CLINIC | Age: 51
End: 2021-07-28

## 2021-07-28 PROBLEM — R87.612 LGSIL ON PAP SMEAR OF CERVIX: Status: ACTIVE | Noted: 2017-05-26

## 2021-07-28 LAB
BKR LAB AP GYN ADEQUACY: NORMAL
BKR LAB AP GYN INTERPRETATION: NORMAL
BKR LAB AP HPV REFLEX: NORMAL
BKR LAB AP PREVIOUS ABNL DX: NORMAL
BKR LAB AP PREVIOUS ABNORMAL: NORMAL
PATH REPORT.COMMENTS IMP SPEC: NORMAL
PATH REPORT.RELEVANT HX SPEC: NORMAL

## 2021-08-05 ENCOUNTER — TRANSFERRED RECORDS (OUTPATIENT)
Dept: HEALTH INFORMATION MANAGEMENT | Facility: CLINIC | Age: 51
End: 2021-08-05

## 2021-08-06 ENCOUNTER — THERAPY VISIT (OUTPATIENT)
Dept: OCCUPATIONAL THERAPY | Facility: CLINIC | Age: 51
End: 2021-08-06
Payer: COMMERCIAL

## 2021-08-06 ENCOUNTER — THERAPY VISIT (OUTPATIENT)
Dept: PHYSICAL THERAPY | Facility: CLINIC | Age: 51
End: 2021-08-06
Payer: COMMERCIAL

## 2021-08-06 DIAGNOSIS — M25.511 RIGHT SHOULDER PAIN, UNSPECIFIED CHRONICITY: ICD-10-CM

## 2021-08-06 DIAGNOSIS — Z48.89 OTHER SPECIFIED AFTERCARE FOLLOWING SURGERY: ICD-10-CM

## 2021-08-06 DIAGNOSIS — M25.531 RIGHT WRIST PAIN: Primary | ICD-10-CM

## 2021-08-06 DIAGNOSIS — M25.631 WRIST STIFFNESS, RIGHT: ICD-10-CM

## 2021-08-06 DIAGNOSIS — M25.511 SHOULDER PAIN, RIGHT: Primary | ICD-10-CM

## 2021-08-06 DIAGNOSIS — S52.531D CLOSED COLLES' FRACTURE OF RIGHT RADIUS WITH ROUTINE HEALING: ICD-10-CM

## 2021-08-06 PROCEDURE — 97530 THERAPEUTIC ACTIVITIES: CPT | Mod: GO | Performed by: OCCUPATIONAL THERAPIST

## 2021-08-06 PROCEDURE — 97110 THERAPEUTIC EXERCISES: CPT | Mod: GP | Performed by: PHYSICAL THERAPIST

## 2021-08-06 PROCEDURE — 97140 MANUAL THERAPY 1/> REGIONS: CPT | Mod: GP | Performed by: PHYSICAL THERAPIST

## 2021-08-06 PROCEDURE — 97110 THERAPEUTIC EXERCISES: CPT | Mod: GO | Performed by: OCCUPATIONAL THERAPIST

## 2021-08-06 NOTE — PROGRESS NOTES
".SOAP note objective information for 8/6/2021.  Please refer to the daily flowsheet for treatment today, total treatment time and time spent performing 1:1 timed codes.        Diagnosis:  Right Distal radius fracture  DOI:  1/22/21  DOS:  2/2/21  Procedure:  ORIF  Post: 7 months+    Referring MD: Bear Alejo MD @ [facility]    Next MD visit: PRN    S:  See note      Pain Level Report:  Pain Level (Scale 0-10):   2/15/2021 3/22 4/22 5/6 6/10/21 7/22 8/6/21   At Rest 3-4 0  0 0 0 0   With Use 6-7 6-8 5 6-7 5 Usually about 3-4  Was up to 7 in the couple of last weeks Worst: 5  Use: 3-4 with use, on a walk and not using it, 2/10     Pain Description:  Date 2/15/2021 4/22 5/6 6/10/21 7/22/21 8/6/21    right         Location  elbow, forearm, wrist and hand  Shoulder and elbow cubital tunnel area since the surgery has been noted        Pain Quality Aching, Dull, Numb, Sharp, Tender, Tingling and zinging Much less often, and numbness in palm is lessening Pain is more on dorsal wrist middle and going into palm up it feels like \"cement' does not move. More swollen, fingers are more stiff. Noting a click in the wrist, ulnar aspect Higher due to after new motion gained at last clinic visit. Calmed down  Some pain with heavier tasks, can be an underlying grade    Frequency intermittent, constant or daily          Pain is worst daytime        Exacerbated by Use and typing        Relieved by heat, rest and touching and moving into a better position, OTC as needed        Progression Not better improving Some worse  Some increase noted, but this seems to be decreasing recently improved     Edema:  7/22/2021: over intersection area, see below in palpation section  Wound/Scar: light to no pink noted at wrist, and palm   Palpation:  []     Normal        [x]   Tender       [x]  Mild         []   Moderate []   Severe   Location: dorsal distal forearm, over intersection between ECRB tendon and EPB/APL mm bellies. No pain " with resisted ECRB or EPB/APL, but tender and mild edema noted, with crepitus noted upon AROM   Sensation: IMPROVING Decreased Median Nerve distribution per pt report , reporting finger tips have good sensation, tender in the mid palm area.  ROM:  Wrist 2/15/2021 3/22 4/13 5/20 6/10/21 6/17 7/8 7/22    AROM(PROM) Right           Extension 20 35  POST: 45 38 45 40  55 55+    Flexion 15 35  POST: 45 47 (55) 40 40  45  p trt: 58 52+    RD            UD            Supination Amended: 20 55 32 with DRUJ  55 w/o DRUJ 47 (70) Pre: 55  Post: 66 Pre: 55  (72) 75 70+    Pronation Amended: 65 70 65 58 (68) 65  75 75      ROM:    Thumb  2/15/2021 2/22 3/22 4/13 7/8/21   AROM(PROM)  Right       MP  0/30       IP  -5/50       RAbd         PAbd  55       Retropulsion         Kapandji Opposition Scale (0-10/10)  5 7 8 8.5 9.0       ROM:   Fingers 2/15/2021 2/15/2021 3/22   AROM(PROM) Right Flex lag Flexion lag   Index MP -15 2.5 0   PIP -10     DIP      FERRARA      Long MP -20 2.5 0   PIP -15     DIP      FERRARA      Ring MP -15 2.5 0   PIP -15     DIP      FERRARA      Small MP -10 1.75 0   PIP -10     DIP      FERRARA        Strength   (Measured in pounds)  Pain Report:  + mild    ++ moderate    +++ severe    5/6/2021 5/6/2021 6/10/21 7/8 7/22/21 8/6/21   Trials Right* Left Right R LL R L R   1  2  3 7  10  10 33  30  34 13  10  10 21 37 20  20  20 35 21  19   Average 9 32 11   20  20     Lat Pinch 5/6/2021 5/6/2021 6/10/21 7/22   Trials Right Left RIght R   1  2  3 5  6  6 11  11  10 5  5  5 9  10  10   Average 6 11 5 10     3 Pt Pinch 5/6/2021 5/6/2021 6/10/21 7/22/21   Trials Right Left Right R   1  2  3 3  4  4 9  8  8 3  4  3 7  8  8   Average 4 8 3 8     Assessment:  See flowsheet    P:  Frequency/Duration:  Recommend continuing with the current treatment plan. 2 X a month, once daily  for 2 months  Recommendations for Continued Therapy  Treatment Plan:   Additions to Treatment Plan -  Modalities:  US  Deletions to Treatment Plan -   Modalities:  Fluidotherapy and Paraffin  Treatment Plan:   Modalities:  Fluidotherapy and Paraffin, progressing strengthening  Therapeutic Exercise:  AROM, AAROM, PROM, Isotonics and Isometrics  Neuromuscular re-education:  Kinesthetic Training, Proprioceptive Training and Stabilization  Manual Techniques:  Scar mobilization and Myofascial release  Self Care:  Self Care Tasks and Ergonomic Considerations    Home Program:   7/8/2021  Return to isometrics wrist  CST wt bearing  Hands on table for antecubital turning  NM strap for wrist flexion/extn for prox row motion  7/22/2021  Continue with CST wrist taping, alternate wrist strengthening: isometrics, isotonics with wts or theraband  8/6/2021  Wrist flexion with wt and with TB red for stretch  Progress gripping to increase frequency to fatigue the muscles      Next Visit:  Progress strengthening: wrist isometrics and isotonics

## 2021-08-20 ENCOUNTER — THERAPY VISIT (OUTPATIENT)
Dept: OCCUPATIONAL THERAPY | Facility: CLINIC | Age: 51
End: 2021-08-20
Payer: COMMERCIAL

## 2021-08-20 ENCOUNTER — THERAPY VISIT (OUTPATIENT)
Dept: PHYSICAL THERAPY | Facility: CLINIC | Age: 51
End: 2021-08-20
Payer: COMMERCIAL

## 2021-08-20 DIAGNOSIS — M25.531 RIGHT WRIST PAIN: ICD-10-CM

## 2021-08-20 DIAGNOSIS — Z48.89 OTHER SPECIFIED AFTERCARE FOLLOWING SURGERY: ICD-10-CM

## 2021-08-20 DIAGNOSIS — S52.531D CLOSED COLLES' FRACTURE OF RIGHT RADIUS WITH ROUTINE HEALING: ICD-10-CM

## 2021-08-20 DIAGNOSIS — M25.631 WRIST STIFFNESS, RIGHT: Primary | ICD-10-CM

## 2021-08-20 DIAGNOSIS — M25.511 SHOULDER PAIN, RIGHT: Primary | ICD-10-CM

## 2021-08-20 PROCEDURE — 97140 MANUAL THERAPY 1/> REGIONS: CPT | Mod: GP | Performed by: PHYSICAL THERAPIST

## 2021-08-20 PROCEDURE — 97035 APP MDLTY 1+ULTRASOUND EA 15: CPT | Mod: GO | Performed by: OCCUPATIONAL THERAPIST

## 2021-08-20 PROCEDURE — 97140 MANUAL THERAPY 1/> REGIONS: CPT | Mod: GO | Performed by: OCCUPATIONAL THERAPIST

## 2021-08-20 PROCEDURE — 97110 THERAPEUTIC EXERCISES: CPT | Mod: GO | Performed by: OCCUPATIONAL THERAPIST

## 2021-08-20 PROCEDURE — 97110 THERAPEUTIC EXERCISES: CPT | Mod: GP | Performed by: PHYSICAL THERAPIST

## 2021-08-20 NOTE — PROGRESS NOTES
".SOAP note objective information for 8/20/2021.  Please refer to the daily flowsheet for treatment today, total treatment time and time spent performing 1:1 timed codes.        Diagnosis:  Right Distal radius fracture  DOI:  1/22/21  DOS:  2/2/21  Procedure:  ORIF  Post: 7 months+    Referring MD: Bear Alejo MD @ [facility]    Next MD visit: PRN    S:  See note      Pain Level Report:  Pain Level (Scale 0-10):   2/15/2021 3/22 4/22 5/6 6/10/21 7/22 8/6/21   At Rest 3-4 0  0 0 0 0   With Use 6-7 6-8 5 6-7 5 Usually about 3-4  Was up to 7 in the couple of last weeks Worst: 5  Use: 3-4 with use, on a walk and not using it, 2/10     Pain Description:  Date 2/15/2021 4/22 5/6 6/10/21 7/22/21 8/6/21    right         Location  elbow, forearm, wrist and hand  Shoulder and elbow cubital tunnel area since the surgery has been noted        Pain Quality Aching, Dull, Numb, Sharp, Tender, Tingling and zinging Much less often, and numbness in palm is lessening Pain is more on dorsal wrist middle and going into palm up it feels like \"cement' does not move. More swollen, fingers are more stiff. Noting a click in the wrist, ulnar aspect Higher due to after new motion gained at last clinic visit. Calmed down  Some pain with heavier tasks, can be an underlying grade    Frequency intermittent, constant or daily          Pain is worst daytime        Exacerbated by Use and typing        Relieved by heat, rest and touching and moving into a better position, OTC as needed        Progression Not better improving Some worse  Some increase noted, but this seems to be decreasing recently improved     Edema:  7/22/2021: over intersection area, see below in palpation section  Wound/Scar: light to no pink noted at wrist, and palm   Palpation:  []     Normal        [x]   Tender       [x]  Mild         []   Moderate []   Severe   Location: dorsal distal forearm, over intersection between ECRB tendon and EPB/APL mm bellies. No pain " with resisted ECRB or EPB/APL, but tender and mild edema noted, with crepitus noted upon AROM   Sensation: IMPROVING Decreased Median Nerve distribution per pt report , reporting finger tips have good sensation, tender in the mid palm area.  ROM:  Wrist 2/15/2021 3/22 4/13 5/20 6/10/21 6/17 7/8 7/22 8/20   AROM(PROM) Right           Extension 20 35  POST: 45 38 45 40  55 55+ 58   Flexion 15 35  POST: 45 47 (55) 40 40  45  p trt: 58 52+ 53   RD            UD            Supination Amended: 20 55 32 with DRUJ  55 w/o DRUJ 47 (70) Pre: 55  Post: 66 Pre: 55  (72) 75 70+ 75   Pronation Amended: 65 70 65 58 (68) 65  75 75      ROM:    Thumb  2/15/2021 2/22 3/22 4/13 7/8/21   AROM(PROM)  Right       MP  0/30       IP  -5/50       RAbd         PAbd  55       Retropulsion         Kapandji Opposition Scale (0-10/10)  5 7 8 8.5 9.0       ROM:   Fingers 2/15/2021 2/15/2021 3/22   AROM(PROM) Right Flex lag Flexion lag   Index MP -15 2.5 0   PIP -10     DIP      FERRARA      Long MP -20 2.5 0   PIP -15     DIP      FERRARA      Ring MP -15 2.5 0   PIP -15     DIP      FERRARA      Small MP -10 1.75 0   PIP -10     DIP      FERRARA        Strength   (Measured in pounds)  Pain Report:  + mild    ++ moderate    +++ severe    5/6/2021 5/6/2021 6/10/21 7/8 7/22/21 8/6/21   Trials Right* Left Right R LL R L R   1  2  3 7  10  10 33  30  34 13  10  10 21 37 20  20  20 35 21  19   Average 9 32 11   20  20     Lat Pinch 5/6/2021 5/6/2021 6/10/21 7/22   Trials Right Left RIght R   1  2  3 5  6  6 11  11  10 5  5  5 9  10  10   Average 6 11 5 10     3 Pt Pinch 5/6/2021 5/6/2021 6/10/21 7/22/21   Trials Right Left Right R   1  2  3 3  4  4 9  8  8 3  4  3 7  8  8   Average 4 8 3 8     Assessment:  See flowsheet    P:  Frequency/Duration:  Recommend continuing with the current treatment plan. 2 X a month, once daily  for 2 months  Recommendations for Continued Therapy  Treatment Plan:   Additions to Treatment Plan -  Modalities:  US  Deletions to  Treatment Plan -  Modalities:  Fluidotherapy and Paraffin  Treatment Plan:   Modalities:  Fluidotherapy and Paraffin, progressing strengthening  Therapeutic Exercise:  AROM, AAROM, PROM, Isotonics and Isometrics  Neuromuscular re-education:  Kinesthetic Training, Proprioceptive Training and Stabilization  Manual Techniques:  Scar mobilization and Myofascial release  Self Care:  Self Care Tasks and Ergonomic Considerations    Home Program:   7/8/2021  Return to isometrics wrist  CST wt bearing  Hands on table for antecubital turning  NM strap for wrist flexion/extn for prox row motion  7/22/2021  Continue with CST wrist taping, alternate wrist strengthening: isometrics, isotonics with wts or theraband  8/6/2021  Wrist flexion with wt and with TB red for stretch  Progress gripping to increase frequency to fatigue the muscles  8/20/2021  Increase  freq of gripping in sup and neutral NOT pronation, can stop isometric holds for gripping  Icing at dorsal wrist      Next Visit:  Progress strengthening: wrist isometrics and isotonics

## 2021-09-02 ENCOUNTER — THERAPY VISIT (OUTPATIENT)
Dept: OCCUPATIONAL THERAPY | Facility: CLINIC | Age: 51
End: 2021-09-02
Payer: COMMERCIAL

## 2021-09-02 ENCOUNTER — THERAPY VISIT (OUTPATIENT)
Dept: PHYSICAL THERAPY | Facility: CLINIC | Age: 51
End: 2021-09-02
Payer: COMMERCIAL

## 2021-09-02 DIAGNOSIS — S52.531D CLOSED COLLES' FRACTURE OF RIGHT RADIUS WITH ROUTINE HEALING: ICD-10-CM

## 2021-09-02 DIAGNOSIS — M25.511 RIGHT SHOULDER PAIN, UNSPECIFIED CHRONICITY: ICD-10-CM

## 2021-09-02 DIAGNOSIS — M25.511 SHOULDER PAIN, RIGHT: Primary | ICD-10-CM

## 2021-09-02 DIAGNOSIS — Z48.89 OTHER SPECIFIED AFTERCARE FOLLOWING SURGERY: ICD-10-CM

## 2021-09-02 DIAGNOSIS — M25.531 RIGHT WRIST PAIN: ICD-10-CM

## 2021-09-02 DIAGNOSIS — M25.631 WRIST STIFFNESS, RIGHT: Primary | ICD-10-CM

## 2021-09-02 PROCEDURE — 97140 MANUAL THERAPY 1/> REGIONS: CPT | Mod: GP | Performed by: PHYSICAL THERAPIST

## 2021-09-02 PROCEDURE — 97140 MANUAL THERAPY 1/> REGIONS: CPT | Mod: GO | Performed by: OCCUPATIONAL THERAPIST

## 2021-09-02 PROCEDURE — 97110 THERAPEUTIC EXERCISES: CPT | Mod: GO | Performed by: OCCUPATIONAL THERAPIST

## 2021-09-02 PROCEDURE — 97110 THERAPEUTIC EXERCISES: CPT | Mod: GP | Performed by: PHYSICAL THERAPIST

## 2021-09-02 NOTE — PROGRESS NOTES
"SOAP note objective information for 9/2/2021.  Please refer to the daily flowsheet for treatment today, total treatment time and time spent performing 1:1 timed codes.      Diagnosis:  Right Distal radius fracture  DOI:  1/22/21  DOS:  2/2/21  Procedure:  ORIF  Post: 8 months    Referring MD: Bear Alejo MD @ [facility]    S:  See flowsheet    9/2/2021  Upper Extremity Functional Index Score:  SCORE:   Column Totals: /80: 46   (A lower score indicates greater disability.)      Pain Level Report:  Pain Level (Scale 0-10):   2/15/2021 3/22 4/22 5/6 6/10/21 7/22 8/6/21    At Rest 3-4 0  0 0 0 0    With Use 6-7 6-8 5 6-7 5 Usually about 3-4  Was up to 7 in the couple of last weeks Worst: 5  Use: 3-4 with use, on a walk and not using it, 2/10      Pain Description:  Date 2/15/2021 4/22 5/6 6/10/21 7/22/21 8/6/21     right          Location  elbow, forearm, wrist and hand  Shoulder and elbow cubital tunnel area since the surgery has been noted         Pain Quality Aching, Dull, Numb, Sharp, Tender, Tingling and zinging Much less often, and numbness in palm is lessening Pain is more on dorsal wrist middle and going into palm up it feels like \"cement' does not move. More swollen, fingers are more stiff. Noting a click in the wrist, ulnar aspect Higher due to after new motion gained at last clinic visit. Calmed down  Some pain with heavier tasks, can be an underlying grade     Frequency intermittent, constant or daily           Pain is worst daytime         Exacerbated by Use and typing         Relieved by heat, rest and touching and moving into a better position, OTC as needed         Progression Not better improving Some worse  Some increase noted, but this seems to be decreasing recently improved      Edema:  7/22/2021: over intersection area, see below in palpation section  Wound/Scar: light to no pink noted at wrist, and palm   Palpation:  []     Normal        [x]   Tender       [x]  Mild         []   " Moderate []   Severe   Location: dorsal distal forearm, over intersection between ECRB tendon and EPB/APL mm bellies. No pain with resisted ECRB or EPB/APL, but tender and mild edema noted, with crepitus noted upon AROM   Sensation: IMPROVING Decreased Median Nerve distribution per pt report , reporting finger tips have good sensation, tender in the mid palm area.  ROM:  Wrist 2/15/2021 3/22 4/13 5/20 6/10/21 6/17 7/8 7/22 8/20 9/2   AROM(PROM) Right            Extension 20 35  POST: 45 38 45 40  55 55+ 58 58  p trt: 6   Flexion 15 35  POST: 45 47 (55) 40 40  45  p trt: 58 52+ 53 55  p trt: 60   RD             UD             Supination Amended: 20 55 32 with DRUJ  55 w/o DRUJ 47 (70) Pre: 55  Post: 66 Pre: 55  (72) 75 70+ 75    Pronation Amended: 65 70 65 58 (68) 65  75 75       ROM:    Thumb  2/15/2021 2/22 3/22 4/13 7/8/21    AROM(PROM)  Right        MP  0/30        IP  -5/50        RAbd          PAbd  55        Retropulsion          Kapandji Opposition Scale (0-10/10)  5 7 8 8.5 9.0        ROM:   Fingers 2/15/2021 2/15/2021 3/22   AROM(PROM) Right Flex lag Flexion lag   Index MP -15 2.5 0   PIP -10     DIP      FERRARA      Long MP -20 2.5 0   PIP -15     DIP      FERRARA      Ring MP -15 2.5 0   PIP -15     DIP      FERRARA      Small MP -10 1.75 0   PIP -10     DIP      FERRARA        Strength   (Measured in pounds)  Pain Report:  + mild    ++ moderate    +++ severe    5/6/2021 5/6/2021 6/10/21 7/8 7/22/21 8/6/21 9//2/21   Trials Right* Left Right R LL R L R    1  2  3 7  10  10 33  30  34 13  10  10 21 37 20  20  20 35 21  19 31  30  31   Average 9 32 11   20  20 31     Lat Pinch 5/6/2021 5/6/2021 6/10/21 7/22    Trials Right Left RIght R    1  2  3 5  6  6 11  11  10 5  5  5 9  10  10    Average 6 11 5 10      3 Pt Pinch 5/6/2021 5/6/2021 6/10/21 7/22/21    Trials Right Left Right R    1  2  3 3  4  4 9  8  8 3  4  3 7  8  8    Average 4 8 3 8      Assessment:  See flowsheet    P:  Frequency/Duration:  Recommend  continuing with the current treatment plan. 2 X a month, once daily  for 2 months  Recommendations for Continued Therapy  Treatment Plan:   Additions to Treatment Plan -  Modalities:  US  Deletions to Treatment Plan -  Modalities:  Fluidotherapy and Paraffin  Treatment Plan:   Modalities:  Fluidotherapy and Paraffin, progressing strengthening  Therapeutic Exercise:  AROM, AAROM, PROM, Isotonics and Isometrics  Neuromuscular re-education:  Kinesthetic Training, Proprioceptive Training and Stabilization  Manual Techniques:  Scar mobilization and Myofascial release  Self Care:  Self Care Tasks and Ergonomic Considerations    Home Program:   7/8/2021  Return to isometrics wrist  CST wt bearing  Hands on table for antecubital turning  NM strap for wrist flexion/extn for prox row motion  7/22/2021  Continue with CST wrist taping, alternate wrist strengthening: isometrics, isotonics with wts or theraband  8/6/2021  Wrist flexion with wt and with TB red for stretch  Progress gripping to increase frequency to fatigue the muscles  8/20/2021  Increase  freq of gripping in sup and neutral NOT pronation, can stop isometric holds for gripping  Icing at dorsal wrist  9/2/2021  Self mobs for wrist ROM      Next Visit:  Progress strengthening: wrist isometrics and isotonics   Wrist MOBS

## 2021-09-10 ENCOUNTER — LAB (OUTPATIENT)
Dept: LAB | Facility: CLINIC | Age: 51
End: 2021-09-10
Payer: COMMERCIAL

## 2021-09-10 ENCOUNTER — OFFICE VISIT (OUTPATIENT)
Dept: INTERNAL MEDICINE | Facility: CLINIC | Age: 51
End: 2021-09-10
Payer: COMMERCIAL

## 2021-09-10 VITALS
SYSTOLIC BLOOD PRESSURE: 100 MMHG | OXYGEN SATURATION: 100 % | WEIGHT: 100 LBS | HEART RATE: 56 BPM | DIASTOLIC BLOOD PRESSURE: 58 MMHG | HEIGHT: 65 IN | BODY MASS INDEX: 16.66 KG/M2

## 2021-09-10 DIAGNOSIS — Z11.51 SCREENING FOR HUMAN PAPILLOMAVIRUS: Primary | ICD-10-CM

## 2021-09-10 DIAGNOSIS — D72.9 ABNORMAL WBC COUNT: ICD-10-CM

## 2021-09-10 LAB
BASOPHILS # BLD AUTO: 0 10E3/UL (ref 0–0.2)
BASOPHILS NFR BLD AUTO: 1 %
EOSINOPHIL # BLD AUTO: 0.1 10E3/UL (ref 0–0.7)
EOSINOPHIL NFR BLD AUTO: 3 %
ERYTHROCYTE [DISTWIDTH] IN BLOOD BY AUTOMATED COUNT: 12.7 % (ref 10–15)
HCT VFR BLD AUTO: 43.3 % (ref 35–47)
HGB BLD-MCNC: 14.2 G/DL (ref 11.7–15.7)
IMM GRANULOCYTES # BLD: 0 10E3/UL
IMM GRANULOCYTES NFR BLD: 0 %
LYMPHOCYTES # BLD AUTO: 1 10E3/UL (ref 0.8–5.3)
LYMPHOCYTES NFR BLD AUTO: 31 %
MCH RBC QN AUTO: 30.2 PG (ref 26.5–33)
MCHC RBC AUTO-ENTMCNC: 32.8 G/DL (ref 31.5–36.5)
MCV RBC AUTO: 92 FL (ref 78–100)
MONOCYTES # BLD AUTO: 0.4 10E3/UL (ref 0–1.3)
MONOCYTES NFR BLD AUTO: 13 %
NEUTROPHILS # BLD AUTO: 1.6 10E3/UL (ref 1.6–8.3)
NEUTROPHILS NFR BLD AUTO: 52 %
NRBC # BLD AUTO: 0 10E3/UL
NRBC BLD AUTO-RTO: 0 /100
PATH REPORT.COMMENTS IMP SPEC: NORMAL
PATH REPORT.COMMENTS IMP SPEC: NORMAL
PATH REPORT.FINAL DX SPEC: NORMAL
PATH REPORT.MICROSCOPIC SPEC OTHER STN: NORMAL
PATH REPORT.RELEVANT HX SPEC: NORMAL
PLATELET # BLD AUTO: 183 10E3/UL (ref 150–450)
RBC # BLD AUTO: 4.7 10E6/UL (ref 3.8–5.2)
RETICS # AUTO: 0.05 10E6/UL (ref 0.01–0.11)
RETICS/RBC NFR AUTO: 1 % (ref 0.8–2.7)
WBC # BLD AUTO: 3 10E3/UL (ref 4–11)

## 2021-09-10 PROCEDURE — 87624 HPV HI-RISK TYP POOLED RSLT: CPT | Performed by: NURSE PRACTITIONER

## 2021-09-10 PROCEDURE — G0124 SCREEN C/V THIN LAYER BY MD: HCPCS | Performed by: PATHOLOGY

## 2021-09-10 PROCEDURE — 99213 OFFICE O/P EST LOW 20 MIN: CPT | Performed by: NURSE PRACTITIONER

## 2021-09-10 PROCEDURE — 85045 AUTOMATED RETICULOCYTE COUNT: CPT

## 2021-09-10 PROCEDURE — 85025 COMPLETE CBC W/AUTO DIFF WBC: CPT

## 2021-09-10 PROCEDURE — 36415 COLL VENOUS BLD VENIPUNCTURE: CPT

## 2021-09-10 PROCEDURE — G0123 SCREEN CERV/VAG THIN LAYER: HCPCS | Performed by: NURSE PRACTITIONER

## 2021-09-10 ASSESSMENT — MIFFLIN-ST. JEOR: SCORE: 1051.54

## 2021-09-10 NOTE — PROGRESS NOTES
"/Clinic Note    Assessment:     Assessment and Plan:  1. Screening for human papillomavirus: PAP done today. No abnormal exam findings.    2. Abnormal WBC Count: on 07/19; will recheck labs today. Hematology referral possibly if needed. No symptoms today.      There are no Patient Instructions on file for this visit.  Return if symptoms worsen or fail to improve.         Subjective:      Iman Alves is a 51 year old female presents today for a repeat pap smear.     She is also due for follow up blood work. Her WBC was low on 07/19/21.    She denies any new concerns today. Her last period was in February 2021. She has not had intercourse in three months.    The following portions of the patient's history were reviewed and updated as appropriate.    Review of Systems:    Review is otherwise negative except for what is mentioned above.     Social Hx:    History   Smoking Status     Never Smoker   Smokeless Tobacco     Never Used         Objective:     Vitals:    09/10/21 0853   BP: 100/58   BP Location: Left arm   Patient Position: Sitting   Cuff Size: Adult Regular   Pulse: 56   SpO2: 100%   Weight: 45.4 kg (100 lb)   Height: 1.638 m (5' 4.5\")       Exam:  General: No apparent distress. Calm. Alert and Oriented X3. Pt behavior is appropriate.  Head:Atraumatic. Normocephalic.   Chest/Lungs: Equal chest rise and fall, able to talk in full sentences without shortness of breath.   Genitalia: Normal cervix, no discharge, or cervical motion tenderness. Vagina also appears normal, no discharge, or smell noted.   Musculoskeletal: Walks without difficulty.   Neurologic: Interactive, alert, no focal findings.   Skin: Warm, dry.       Patient Active Problem List   Diagnosis     Closed Colles' fracture of right radius with routine healing     Right wrist pain     Wrist stiffness, right     Other specified aftercare following surgery     Right shoulder pain     Calculus of kidney     Chronic pelvic pain in female     Low urine " output     Specified congenital anomalies of hair     Urinary calculus, unspecified     LGSIL on Pap smear of cervix     Current Outpatient Medications   Medication Sig Dispense Refill     ascorbic acid (ASCORBIC ACID WITH ROBERT HIPS) 500 MG tablet [ASCORBIC ACID (ASCORBIC ACID WITH ROBERT HIPS) 500 MG TABLET] Take 500 mg by mouth daily.       b complex vitamins tablet [B COMPLEX VITAMINS TABLET] Take 1 tablet by mouth 2 (two) times a day.       calcium citrate (CALCITRATE) 200 mg (950 mg) tablet [CALCIUM CITRATE (CALCITRATE) 200 MG (950 MG) TABLET] Take 1 tablet by mouth daily.       cholecalciferol, vitamin D3, (VITAMIN D3) 1,000 unit capsule [CHOLECALCIFEROL, VITAMIN D3, (VITAMIN D3) 1,000 UNIT CAPSULE] Take 2,000 Units by mouth daily.        ENZYMES,DIGESTIVE (DIGESTIVE ENZYMES ORAL) [ENZYMES,DIGESTIVE (DIGESTIVE ENZYMES ORAL)] Take 1 capsule by mouth 3 (three) times a day.       magnesium gluconate (MAGONATE) 27.5 mg (500 mg) tablet [MAGNESIUM GLUCONATE (MAGONATE) 27.5 MG (500 MG) TABLET] Take 240 mg by mouth 2 (two) times a day.       MEDICATION CANNOT BE REORDERED - PLEASE MANUALLY REORDER AND DISCONTINUE THE OLD ORDER [LACTOBAC CMB #3/FOS/PANTETHINE (PROBIOTIC & ACIDOPHILUS ORAL)] Take 80 each by mouth.       OMEGA-3 FATTY ACIDS/FISH OIL (SEA-OMEGA 30 ORAL) [OMEGA-3 FATTY ACIDS/FISH OIL (SEA-OMEGA 30 ORAL)] Take 1 capsule by mouth 2 (two) times a day.       PV W-O WILTON/FERROUS FUMARATE/FA (M-VIT ORAL) [PV W-O WILTON/FERROUS FUMARATE/FA (M-VIT ORAL)] Take 1 tablet by mouth daily.       Nanci Friedman, Adult-Geriatric Nurse Practitioner  [facility] - Internal Medicine Team     9/10/2021

## 2021-09-16 ENCOUNTER — THERAPY VISIT (OUTPATIENT)
Dept: OCCUPATIONAL THERAPY | Facility: CLINIC | Age: 51
End: 2021-09-16
Payer: COMMERCIAL

## 2021-09-16 DIAGNOSIS — M25.531 RIGHT WRIST PAIN: ICD-10-CM

## 2021-09-16 DIAGNOSIS — S52.531D CLOSED COLLES' FRACTURE OF RIGHT RADIUS WITH ROUTINE HEALING: ICD-10-CM

## 2021-09-16 DIAGNOSIS — Z48.89 OTHER SPECIFIED AFTERCARE FOLLOWING SURGERY: ICD-10-CM

## 2021-09-16 DIAGNOSIS — M25.631 WRIST STIFFNESS, RIGHT: Primary | ICD-10-CM

## 2021-09-16 DIAGNOSIS — M25.511 RIGHT SHOULDER PAIN, UNSPECIFIED CHRONICITY: ICD-10-CM

## 2021-09-16 PROCEDURE — 97112 NEUROMUSCULAR REEDUCATION: CPT | Mod: GO | Performed by: OCCUPATIONAL THERAPIST

## 2021-09-16 PROCEDURE — 97140 MANUAL THERAPY 1/> REGIONS: CPT | Mod: GO | Performed by: OCCUPATIONAL THERAPIST

## 2021-09-16 NOTE — PROGRESS NOTES
"SOAP note objective information for 9/16/2021.  Please refer to the daily flowsheet for treatment today, total treatment time and time spent performing 1:1 timed codes.      Diagnosis:  Right Distal radius fracture  DOI:  1/22/21  DOS:  2/2/21  Procedure:  ORIF  Post: 8 months    Referring MD: Bear Alejo MD @ [facility]    S:  See flowsheet    9/2/2021  Upper Extremity Functional Index Score:  SCORE:   Column Totals: /80: 46   (A lower score indicates greater disability.)      Pain Level Report:  Pain Level (Scale 0-10):   2/15/2021 3/22 4/22 5/6 6/10/21 7/22 8/6/21    At Rest 3-4 0  0 0 0 0    With Use 6-7 6-8 5 6-7 5 Usually about 3-4  Was up to 7 in the couple of last weeks Worst: 5  Use: 3-4 with use, on a walk and not using it, 2/10      Pain Description:  Date 2/15/2021 4/22 5/6 6/10/21 7/22/21 8/6/21     right          Location  elbow, forearm, wrist and hand  Shoulder and elbow cubital tunnel area since the surgery has been noted         Pain Quality Aching, Dull, Numb, Sharp, Tender, Tingling and zinging Much less often, and numbness in palm is lessening Pain is more on dorsal wrist middle and going into palm up it feels like \"cement' does not move. More swollen, fingers are more stiff. Noting a click in the wrist, ulnar aspect Higher due to after new motion gained at last clinic visit. Calmed down  Some pain with heavier tasks, can be an underlying grade     Frequency intermittent, constant or daily           Pain is worst daytime         Exacerbated by Use and typing         Relieved by heat, rest and touching and moving into a better position, OTC as needed         Progression Not better improving Some worse  Some increase noted, but this seems to be decreasing recently improved      Edema:  7/22/2021: over intersection area, see below in palpation section  Wound/Scar: light to no pink noted at wrist, and palm   Palpation:  []     Normal        [x]   Tender       [x]  Mild         []   " Moderate []   Severe   Location: dorsal distal forearm, over intersection between ECRB tendon and EPB/APL mm bellies. No pain with resisted ECRB or EPB/APL, but tender and mild edema noted, with crepitus noted upon AROM   Sensation: IMPROVING Decreased Median Nerve distribution per pt report , reporting finger tips have good sensation, tender in the mid palm area.  ROM:  Wrist 2/15/2021 3/22 4/13 5/20 6/10/21 6/17 7/8 7/22 8/20 9/2 9/16   AROM(PROM) Right             Extension 20 35  POST: 45 38 45 40  55 55+ 58 58  p trt: 6x 62  p trt; 62   Flexion 15 35  POST: 45 47 (55) 40 40  45  p trt: 58 52+ 53 55  p trt: 60 58  p trt: 65   RD              UD              Supination Amended: 20 55 32 with DRUJ  55 w/o DRUJ 47 (70) Pre: 55  Post: 66 Pre: 55  (72) 75 70+ 75     Pronation Amended: 65 70 65 58 (68) 65  75 75        ROM:    Thumb  2/15/2021 2/22 3/22 4/13 7/8/21    AROM(PROM)  Right        MP  0/30        IP  -5/50        RAbd          PAbd  55        Retropulsion          Kapandji Opposition Scale (0-10/10)  5 7 8 8.5 9.0        ROM:   Fingers 2/15/2021 2/15/2021 3/22   AROM(PROM) Right Flex lag Flexion lag   Index MP -15 2.5 0   PIP -10     DIP      FERRARA      Long MP -20 2.5 0   PIP -15     DIP      FERRARA      Ring MP -15 2.5 0   PIP -15     DIP      FERRARA      Small MP -10 1.75 0   PIP -10     DIP      FERRARA        Strength   (Measured in pounds)  Pain Report:  + mild    ++ moderate    +++ severe    5/6/2021 5/6/2021 6/10/21 7/8 7/22/21 8/6/21 9/2/21   Trials Right* Left Right R LL R L R R   1  2  3 7  10  10 33  30  34 13  10  10 21 37 20  20  20 35 21  19 31  30  31   Average 9 32 11   20  20 31     Lat Pinch 5/6/2021 5/6/2021 6/10/21 7/22    Trials Right Left RIght R    1  2  3 5  6  6 11  11  10 5  5  5 9  10  10    Average 6 11 5 10      3 Pt Pinch 5/6/2021 5/6/2021 6/10/21 7/22/21    Trials Right Left Right R    1  2  3 3  4  4 9  8  8 3  4  3 7  8  8    Average 4 8 3 8      Assessment:  See  flowsheet    P:  Frequency/Duration:  Recommend continuing with the current treatment plan. 2 X a month, once daily  for 2 months  Recommendations for Continued Therapy  Treatment Plan:   Additions to Treatment Plan -  Modalities:  US  Deletions to Treatment Plan -  Modalities:  Fluidotherapy and Paraffin  Treatment Plan:   Modalities:  Fluidotherapy and Paraffin, progressing strengthening  Therapeutic Exercise:  AROM, AAROM, PROM, Isotonics and Isometrics  Neuromuscular re-education:  Kinesthetic Training, Proprioceptive Training and Stabilization  Manual Techniques:  Scar mobilization and Myofascial release  Self Care:  Self Care Tasks and Ergonomic Considerations    Home Program:   7/8/2021  Return to isometrics wrist  CST wt bearing  Hands on table for antecubital turning  NM strap for wrist flexion/extn for prox row motion  7/22/2021  Continue with CST wrist taping, alternate wrist strengthening: isometrics, isotonics with wts or theraband  8/6/2021  Wrist flexion with wt and with TB red for stretch  Progress gripping to increase frequency to fatigue the muscles  8/20/2021  Increase  freq of gripping in sup and neutral NOT pronation, can stop isometric holds for gripping  Icing at dorsal wrist  9/2/2021  Self mobs for wrist ROM  9/16/2021  Cont wrist mobs and  strengthening      Next Visit:  Progress strengthening: wrist isometrics and isotonics   Wrist MOBS

## 2021-09-21 LAB
BKR LAB AP GYN ADEQUACY: NORMAL
BKR LAB AP GYN INTERPRETATION: NORMAL
BKR LAB AP HPV REFLEX: NORMAL
BKR LAB AP LMP: NORMAL
BKR LAB AP PREVIOUS ABNORMAL: NORMAL
PATH REPORT.COMMENTS IMP SPEC: NORMAL
PATH REPORT.RELEVANT HX SPEC: NORMAL

## 2021-09-26 ENCOUNTER — HEALTH MAINTENANCE LETTER (OUTPATIENT)
Age: 51
End: 2021-09-26

## 2021-09-30 ENCOUNTER — THERAPY VISIT (OUTPATIENT)
Dept: OCCUPATIONAL THERAPY | Facility: CLINIC | Age: 51
End: 2021-09-30
Payer: COMMERCIAL

## 2021-09-30 DIAGNOSIS — M25.531 RIGHT WRIST PAIN: Primary | ICD-10-CM

## 2021-09-30 DIAGNOSIS — M25.511 RIGHT SHOULDER PAIN, UNSPECIFIED CHRONICITY: ICD-10-CM

## 2021-09-30 DIAGNOSIS — Z48.89 OTHER SPECIFIED AFTERCARE FOLLOWING SURGERY: ICD-10-CM

## 2021-09-30 DIAGNOSIS — S52.531D CLOSED COLLES' FRACTURE OF RIGHT RADIUS WITH ROUTINE HEALING: ICD-10-CM

## 2021-09-30 DIAGNOSIS — M25.631 WRIST STIFFNESS, RIGHT: ICD-10-CM

## 2021-09-30 PROCEDURE — 97110 THERAPEUTIC EXERCISES: CPT | Mod: GO | Performed by: OCCUPATIONAL THERAPIST

## 2021-09-30 NOTE — LETTER
[facility]  [address] [address]  [phone]    2021    Re: Iman A Long   :   1970  MRN:  [id number]   REFERRING PHYSICIAN:   Bear SIMON [facility]  Date of Initial Evaluation:  2/15/21  Visits:  Rxs Used: 24  Reason for Referral:   Right shoulder pain, unspecified chronicity  Closed Colles' fracture of right radius with routine healing  Right wrist pain  Wrist stiffness, right  Other specified aftercare following surgery    EVALUATION SUMMARY    Hand Therapy Progress Note  Initial Visit: 2/15/21  Total Visits: 24  Diagnosis:  Right Distal radius fracture  DOI:  21  DOS:  21  Procedure:  ORIF  Post: 8+ months    Referring MD: Bear Alejo MD @ [facility]    S:  Subjective changes as noted by patient: : the last visit did make the wrist more painful and swollen for a few days. Just backed off all exercises,Feels weaker for gripping spray bottle this past week. Now have some numbness in the fingers at the keyboard, it will subside with a break from the keyboard. Feels like it is getting better. I can start to put some weight on the wrist. I will resume all the exercises but at a slower pace.     Functional changes noted by patient: Improvement in Self Care Tasks (dressing, eating, bathing, hygiene/toileting), Work Tasks and Household Chores  Response to previous treatment:  good  Patient has noted adverse reaction to:  Manual therapy at the last visit    2021  Upper Extremity Functional Index Score:  SCORE:   Column Totals: /80: 46   (A lower score indicates greater disability.)    2021  Upper Extremity Functional Index Score:  SCORE:   Column Totals: /80: 51   (A lower score indicates greater disability.)  Re: Iman Alves   :   1970    Pain Level Report:  Pain Level (Scale 0-10):   2/15/2021 3/22 4/22 5/6 6/10/21 7/22 8/6/21 9/30  "  At Rest 3-4 0  0 0 0 0 0-1   With Use 6-7 6-8 5 6-7 5 Usually about 3-4  Was up to 7 in the couple of last weeks Worst: 5  Use: 3-4 with use, on a walk and not using it, 2/10 5 again this past two weeks, it is subsiding     Pain Description:  Date 2/15/2021 4/22 5/6 6/10/21 7/22/21 8/6/21 9/30/21    right          Location  elbow, forearm, wrist and hand  Shoulder and elbow cubital tunnel area since the surgery has been noted         Pain Quality Aching, Dull, Numb, Sharp, Tender, Tingling and zinging Much less often, and numbness in palm is lessening Pain is more on dorsal wrist middle and going into palm up it feels like \"cement' does not move. More swollen, fingers are more stiff. Noting a click in the wrist, ulnar aspect Higher due to after new motion gained at last clinic visit. Calmed down  Some pain with heavier tasks, can be an underlying grade  Some tingling and soreness this past two weeks, seems to be settling down, and reducing.    Frequency intermittent, constant or daily           Pain is worst daytime         Exacerbated by Use and typing         Relieved by heat, rest and touching and moving into a better position, OTC as needed         Progression Not better improving Some worse  Some increase noted, but this seems to be decreasing recently improved Some worse recently but is subsiding   Edema:  2021: over intersection area, see below in palpation section  Wound/Scar: light to no pink noted at wrist, and palm   Palpation:  []     Normal        [x]   Tender       [x]  Mild         []   Moderate []   Severe   Location: dorsal distal forearm, over intersection between ECRB tendon and EPB/APL mm bellies. No pain with resisted ECRB or EPB/APL, but tender and mild edema noted, with crepitus noted upon AROM   Sensation: IMPROVING Decreased Median Nerve distribution per pt report , reporting finger tips have good sensation, tender in the mid palm area.                Re: Iman Alves   :   " 1970  ROM:  Wrist 2/15/2021 3/22 4/13 5/20 6/10/21 6/17 7/8 7/22 8/20 9/16 9/30   AROM(PROM) Right             Extension 20 35  POST: 45 38 45 40  55 55+ 58 62  p trt; 62 50  With prox row support: 55   Flexion 15 35  POST: 45 47 (55) 40 40  45  p trt: 58 52+ 53 58  p trt: 65 55   RD              UD              Supination Amended: 20 55 32 with DRUJ  55 w/o DRUJ 47 (70) Pre: 55  Post: 66 Pre: 55  (72) 75 70+ 75  78   Pronation Amended: 65 70 65 58 (68) 65  75 75   78     ROM:    Thumb  2/15/2021 2/22 3/22 4/13 7/8/21   AROM(PROM)  Right       MP  0/30       IP  -5/50       RAbd         PAbd  55       Retropulsion         Kapandji Opposition Scale (0-10/10)  5 7 8 8.5 9.0     ROM:   Fingers 2/15/2021 2/15/2021 3/22   AROM(PROM) Right Flex lag Flexion lag   Index MP -15 2.5 0   PIP -10     DIP      FERRARA      Long MP -20 2.5 0   PIP -15     DIP      FERRARA      Ring MP -15 2.5 0   PIP -15     DIP      FERRARA      Small MP -10 1.75 0   PIP -10     DIP      FERRARA        Re: Iman NIKO Alves   :   1970    Strength   (Measured in pounds)  Pain Report:  + mild    ++ moderate    +++ severe    2021 2021 6/10/21 7/21   Trials Right* Left Right R LL R L R R R   1  2  3 7  10  10 33  30  34 13  10  10 21 37 20  20  20 35 21  19 31  30  31 30   Average 9 32 11   20  20 31 30     Lat Pinch 2021 2021 6/10/21 7/22 9/30/21   Trials Right Left RIght R    1  2  3 5  6  6 11  11  10 5  5  5 9  10  10 9   Average 6 11 5 10 9     3 Pt Pinch 2021 2021 6/10/21 7/22/21 9/30/21   Trials Right Left Right R R   1  2  3 3  4  4 9  8  8 3  4  3 7  8  8 5+   Average 4 8 3 8 5     Assessment:  Response to therapy has been improvement to:  ROM of Elbow:  All Planes  Wrist:  All Planes  Thumb:  All Planes  Fingers: All Planes  Strength:   and pinch overall since initial visit, mild decrease this past 2 weeks  Work Performance:  Has returned to previous occupation full time  Self Care Skills:   Independent in all tasks  Pain:  frequency is less, duration of pain is decreased, less tender over affected area and only in the last 2 weeks, noted the intensity of pain has increased, however it is subsiding    Overall Assessment:  Patient is becoming more independent in home exercise program  STG/LTG:  STGoals have been reviewed and progress or achievement has occurred;  see goal sheet for details and updates.  LTGoals have been reviewed and progress or achievement has occurred:  see goal sheet for details and updates.  I have re-evaluated this patient and find that the nature, scope, duration and intensity of the therapy is appropriate for the medical condition of the patient.    P:  Discharge to home program.    Treatment Plan:   Additions to Treatment Plan -  Modalities:  US  Deletions to Treatment Plan -  Modalities:  Fluidotherapy and Paraffin  Treatment Plan:   Modalities:  Fluidotherapy and Paraffin, progressing strengthening  Therapeutic Exercise:  AROM, AAROM, PROM, Isotonics and Isometrics  Neuromuscular re-education:  Kinesthetic Training, Proprioceptive Training and Stabilization  Manual Techniques:  Scar mobilization and Myofascial release  Self Care:  Self Care Tasks and Ergonomic Considerations    Re: Iman Alves   :   1970    Home Program:   2021  Return to isometrics wrist  CST wt bearing  Hands on table for antecubital turning  NM strap for wrist flexion/extn for prox row motion  2021  Continue with CST wrist taping, alternate wrist strengthening: isometrics, isotonics with wts or theraband  2021  Wrist flexion with wt and with TB red for stretch  Progress gripping to increase frequency to fatigue the muscles  2021  Increase  freq of gripping in sup and neutral NOT pronation, can stop isometric holds for gripping  Icing at dorsal wrist  2021  Self mobs for wrist ROM    strengthening  Return to functional use in all tasks    Thank you for your  referral.    INQUIRIES  Therapist: MELECIO Darby, OTR/L, ELDER  [address]  [address] [facility] [address]  Phone: [phone]

## 2021-09-30 NOTE — PROGRESS NOTES
Hand Therapy Progress Note    9/30/2021    Initial Visit: 2/15/21  Total Visits: 24    Diagnosis:  Right Distal radius fracture  DOI:  1/22/21  DOS:  2/2/21  Procedure:  ORIF  Post: 8+ months    Referring MD: Bear Alejo MD @ [facility]    S:  Subjective changes as noted by patient: : the last visit did make the wrist more painful and swollen for a few days. Just backed off all exercises,Feels weaker for gripping spray bottle this past week. Now have some numbness in the fingers at the keyboard, it will subside with a break from the keyboard. Feels like it is getting better. I can start to put some weight on the wrist. I will resume all the exercises but at a slower pace.   Functional changes noted by patient: Improvement in Self Care Tasks (dressing, eating, bathing, hygiene/toileting), Work Tasks and Household Chores  Response to previous treatment:  good  Patient has noted adverse reaction to:  Manual therapy at the last visit      9/2/2021  Upper Extremity Functional Index Score:  SCORE:   Column Totals: /80: 46   (A lower score indicates greater disability.)    9/30/2021  Upper Extremity Functional Index Score:  SCORE:   Column Totals: /80: 51   (A lower score indicates greater disability.)      Pain Level Report:  Pain Level (Scale 0-10):   2/15/2021 3/22 4/22 5/6 6/10/21 7/22 8/6/21 9/30   At Rest 3-4 0  0 0 0 0 0-1   With Use 6-7 6-8 5 6-7 5 Usually about 3-4  Was up to 7 in the couple of last weeks Worst: 5  Use: 3-4 with use, on a walk and not using it, 2/10 5 again this past two weeks, it is subsiding     Pain Description:  Date 2/15/2021 4/22 5/6 6/10/21 7/22/21 8/6/21 9/30/21    right          Location  elbow, forearm, wrist and hand  Shoulder and elbow cubital tunnel area since the surgery has been noted         Pain Quality Aching, Dull, Numb, Sharp, Tender, Tingling and zinging Much less often, and numbness in palm is lessening Pain is more on dorsal wrist middle and going into palm up  "it feels like \"cement' does not move. More swollen, fingers are more stiff. Noting a click in the wrist, ulnar aspect Higher due to after new motion gained at last clinic visit. Calmed down  Some pain with heavier tasks, can be an underlying grade  Some tingling and soreness this past two weeks, seems to be settling down, and reducing.    Frequency intermittent, constant or daily           Pain is worst daytime         Exacerbated by Use and typing         Relieved by heat, rest and touching and moving into a better position, OTC as needed         Progression Not better improving Some worse  Some increase noted, but this seems to be decreasing recently improved Some worse recently but is subsiding     Edema:  7/22/2021: over intersection area, see below in palpation section  Wound/Scar: light to no pink noted at wrist, and palm   Palpation:  []     Normal        [x]   Tender       [x]  Mild         []   Moderate []   Severe   Location: dorsal distal forearm, over intersection between ECRB tendon and EPB/APL mm bellies. No pain with resisted ECRB or EPB/APL, but tender and mild edema noted, with crepitus noted upon AROM   Sensation: IMPROVING Decreased Median Nerve distribution per pt report , reporting finger tips have good sensation, tender in the mid palm area.  ROM:  Wrist 2/15/2021 3/22 4/13 5/20 6/10/21 6/17 7/8 7/22 8/20 9/16 9/30   AROM(PROM) Right             Extension 20 35  POST: 45 38 45 40  55 55+ 58 62  p trt; 62 50  With prox row support: 55   Flexion 15 35  POST: 45 47 (55) 40 40  45  p trt: 58 52+ 53 58  p trt: 65 55   RD              UD              Supination Amended: 20 55 32 with DRUJ  55 w/o DRUJ 47 (70) Pre: 55  Post: 66 Pre: 55  (72) 75 70+ 75  78   Pronation Amended: 65 70 65 58 (68) 65  75 75   78     ROM:    Thumb  2/15/2021 2/22 3/22 4/13 7/8/21   AROM(PROM)  Right       MP  0/30       IP  -5/50       RAbd         PAbd  55       Retropulsion         Kapandji Opposition Scale (0-10/10)  5 7 " 8 8.5 9.0       ROM:   Fingers 2/15/2021 2/15/2021 3/22   AROM(PROM) Right Flex lag Flexion lag   Index MP -15 2.5 0   PIP -10     DIP      FERRARA      Long MP -20 2.5 0   PIP -15     DIP      FERRARA      Ring MP -15 2.5 0   PIP -15     DIP      FERRARA      Small MP -10 1.75 0   PIP -10     DIP      FERRARA        Strength   (Measured in pounds)  Pain Report:  + mild    ++ moderate    +++ severe    5/6/2021 5/6/2021 6/10/21 7/8 7/22/21 8/6/21 9/2/21 9/30   Trials Right* Left Right R LL R L R R R   1  2  3 7  10  10 33  30  34 13  10  10 21 37 20  20  20 35 21  19 31  30  31 30   Average 9 32 11   20  20 31 30     Lat Pinch 5/6/2021 5/6/2021 6/10/21 7/22 9/30/21   Trials Right Left RIght R    1  2  3 5  6  6 11  11  10 5  5  5 9  10  10 9   Average 6 11 5 10 9     3 Pt Pinch 5/6/2021 5/6/2021 6/10/21 7/22/21 9/30/21   Trials Right Left Right R R   1  2  3 3  4  4 9  8  8 3  4  3 7  8  8 5+   Average 4 8 3 8 5     Assessment:  Response to therapy has been improvement to:  ROM of Elbow:  All Planes  Wrist:  All Planes  Thumb:  All Planes  Fingers: All Planes  Strength:   and pinch overall since initial visit, mild decrease this past 2 weeks  Work Performance:  Has returned to previous occupation full time  Self Care Skills:  Independent in all tasks  Pain:  frequency is less, duration of pain is decreased, less tender over affected area and only in the last 2 weeks, noted the intensity of pain has increased, however it is subsiding    Overall Assessment:  Patient is becoming more independent in home exercise program  STG/LTG:  STGoals have been reviewed and progress or achievement has occurred;  see goal sheet for details and updates.  LTGoals have been reviewed and progress or achievement has occurred:  see goal sheet for details and updates.  I have re-evaluated this patient and find that the nature, scope, duration and intensity of the therapy is appropriate for the medical condition of the  patient.        P:  Discharge to home program.    Treatment Plan:   Additions to Treatment Plan -  Modalities:  US  Deletions to Treatment Plan -  Modalities:  Fluidotherapy and Paraffin  Treatment Plan:   Modalities:  Fluidotherapy and Paraffin, progressing strengthening  Therapeutic Exercise:  AROM, AAROM, PROM, Isotonics and Isometrics  Neuromuscular re-education:  Kinesthetic Training, Proprioceptive Training and Stabilization  Manual Techniques:  Scar mobilization and Myofascial release  Self Care:  Self Care Tasks and Ergonomic Considerations    Home Program:   7/8/2021  Return to isometrics wrist  CST wt bearing  Hands on table for antecubital turning  NM strap for wrist flexion/extn for prox row motion  7/22/2021  Continue with CST wrist taping, alternate wrist strengthening: isometrics, isotonics with wts or theraband  8/6/2021  Wrist flexion with wt and with TB red for stretch  Progress gripping to increase frequency to fatigue the muscles  8/20/2021  Increase  freq of gripping in sup and neutral NOT pronation, can stop isometric holds for gripping  Icing at dorsal wrist  9/2/2021  Self mobs for wrist ROM    strengthening  Return to functional use in all tasks

## 2021-10-13 ENCOUNTER — ONCOLOGY VISIT (OUTPATIENT)
Dept: ONCOLOGY | Facility: CLINIC | Age: 51
End: 2021-10-13
Attending: NURSE PRACTITIONER
Payer: COMMERCIAL

## 2021-10-13 VITALS
HEIGHT: 64 IN | BODY MASS INDEX: 17.67 KG/M2 | DIASTOLIC BLOOD PRESSURE: 52 MMHG | OXYGEN SATURATION: 100 % | RESPIRATION RATE: 16 BRPM | HEART RATE: 59 BPM | SYSTOLIC BLOOD PRESSURE: 95 MMHG | WEIGHT: 103.5 LBS

## 2021-10-13 DIAGNOSIS — D72.819 LEUKOPENIA, UNSPECIFIED TYPE: Primary | ICD-10-CM

## 2021-10-13 DIAGNOSIS — D72.9 ABNORMAL WBC COUNT: ICD-10-CM

## 2021-10-13 PROCEDURE — 99204 OFFICE O/P NEW MOD 45 MIN: CPT | Performed by: INTERNAL MEDICINE

## 2021-10-13 PROCEDURE — G0463 HOSPITAL OUTPT CLINIC VISIT: HCPCS

## 2021-10-13 ASSESSMENT — MIFFLIN-ST. JEOR: SCORE: 1063.44

## 2021-10-13 ASSESSMENT — PAIN SCALES - GENERAL: PAINLEVEL: MODERATE PAIN (5)

## 2021-10-13 NOTE — LETTER
"    10/13/2021         RE: Iman Alves  [address] [place] [address]        Dear Colleague,    Thank you for referring your patient, Iman Alves, to the [facility] [place]. Please see a copy of my visit note below.    Oncology Rooming Note    October 13, 2021 2:09 PM   Iman Alves is a 51 year old female who presents for:    Chief Complaint   Patient presents with     Hematology     new consult     Initial Vitals: BP 95/52 (BP Location: Left arm, Cuff Size: Adult Regular)   Pulse 59   Resp 16   Ht 1.632 m (5' 4.25\")   Wt 46.9 kg (103 lb 8 oz)   SpO2 100%   BMI 17.63 kg/m   Estimated body mass index is 17.63 kg/m  as calculated from the following:    Height as of this encounter: 1.632 m (5' 4.25\").    Weight as of this encounter: 46.9 kg (103 lb 8 oz). Body surface area is 1.46 meters squared.  Moderate Pain (5) Comment: Data Unavailable   No LMP recorded.  Allergies reviewed: Yes  Medications reviewed: Yes    Medications: Medication refills not needed today.  Pharmacy name entered into [facility]:    [id number] - [place], [place] - [address]    Clinical concerns: new consult     Suzanne Massey              [facility] Hematology and Oncology Consult Note    Patient: Iman Alves  MRN: [id number]  Date of Service: Oct 13, 2021       Reason for Visit    Chief Complaint   Patient presents with     Hematology     new consult         Assessment/Plan    #.  Chronic mild leukopenia   She is otherwise very healthy female without chronic infection, inflammation.  Medications were reviewed in detail.  She is on a few supplements but none is concerning for her mild leukopenia.  She has completely normal hemoglobin and platelets.   She is tested negative negative for hepatitis B, hepatitis C, HIV in 2015. Upon further review, she has similar mild leukopenia with normal hemoglobin platelet in 2013 recorded in care everywhere.   It is " reassuring that she has chronic, mild leukopenia of no clinical significance.  Peripheral blood morphology was reviewed by pathologist on 9/10/2021 and it showed mild leukopenia with borderline neutropenia but no dysplasia, excess blasts suggestive of underlying malignant hematologic condition.   She takes vitamin B complex supplements.  Her diet is balanced.  No concern for vitamin or mineral deficiency.   I recommended clinical observation and to check her blood counts based on clinical signs and symptoms only.  I do not recommend additional work-up.  I advised her to seek medical attention and check her CBC with signs and symptoms of infection to know her WBC, neutrophil are adequate.  She voiced understanding.   Follow-up with me as needed.    ECOG Performance 0 - Independent    Encounter Diagnoses:    Problem List Items Addressed This Visit     None      Visit Diagnoses     Leukopenia, unspecified type    -  Primary    Abnormal WBC count                ______________________________________________________________________________    History    Ms. Iman Alves is a very pleasant 51 year old resented herself today for evaluation of low white cell count.  She was found to have mild leukopenia with normal differential in July 2021 for routine evaluation.  Repeat CBC 2 months later showed mild persistent leukopenia.  She denies alcohol use.  She does not have any chronic infection or inflammation.  No history of chronic hepatitis or HIV infection.  She was taking alpha lipoic acid for recent shoulder injury.  She recalled that she had mastitis during lactation many years ago and she was told her white cell count was abnormal and really back to normal after that.  No hospitalization, or frequent infections.    She does not recall family history of low white cell count.  Father was diagnosed with mantle cell lymphoma at the age of 74.    She has a son who is 14 years old.    Review of systems.  Apart from describing  in history, the remainder of comprehensive ROS was negative.    Past History    Past Medical History:   Diagnosis Date     Kidney stone     2008     LGSIL on Pap smear of cervix 5/26/2017 11/11/13 NIL pap, neg HPV 5/26/17 LSIL, neg HPV 8/10/17 Colpo visually normal, no bx 4/29/19 ASCUS, neg HPV 7/19/21 Unsatisfactory pap, neg HPV. Plan: repeat pap in 2-4 months     Past Surgical History:   Procedure Laterality Date     [place] ANESTH,REPAIR UPPER ABD HERNIA NOS      umbilical     Family History   Problem Relation Age of Onset     Osteoporosis Mother      Prostate Cancer Father         at age 70     No Known Problems Sister      No Known Problems Daughter      No Known Problems Maternal Grandmother      Urolithiasis Maternal Grandfather         recurrent     Gout Maternal Grandfather      Prostate Cancer Maternal Grandfather         at age 70     Cancer Maternal Grandfather         non hodgkin's at age 90     No Known Problems Paternal Grandmother      No Known Problems Paternal Grandfather      No Known Problems Maternal Aunt      No Known Problems Paternal Aunt      Urolithiasis Maternal Uncle      Urolithiasis Cousin      Hereditary Breast and Ovarian Cancer Syndrome No family hx of      Breast Cancer No family hx of      Colon Cancer No family hx of      Endometrial Cancer No family hx of      Ovarian Cancer No family hx of      Social History     Socioeconomic History     Marital status:      Spouse name: None     Number of children: None     Years of education: None     Highest education level: None   Occupational History     None   Tobacco Use     Smoking status: Never Smoker     Smokeless tobacco: Never Used   Substance and Sexual Activity     Alcohol use: No     Drug use: No     Sexual activity: None   Other Topics Concern     None   Social History Narrative     None     Social Determinants of Health     Financial Resource Strain:      Difficulty of Paying Living Expenses:    Food Insecurity:       "Worried About Running Out of Food in the Last Year:      Ran Out of Food in the Last Year:    Transportation Needs:      Lack of Transportation (Medical):      Lack of Transportation (Non-Medical):    Physical Activity:      Days of Exercise per Week:      Minutes of Exercise per Session:    Stress:      Feeling of Stress :    Social Connections:      Frequency of Communication with Friends and Family:      Frequency of Social Gatherings with Friends and Family:      Attends Mosque Services:      Active Member of Clubs or Organizations:      Attends Club or Organization Meetings:      Marital Status:    Intimate Partner Violence:      Fear of Current or Ex-Partner:      Emotionally Abused:      Physically Abused:      Sexually Abused:          Allergies    No Known Allergies      Physical Exam    BP 95/52 (BP Location: Left arm, Cuff Size: Adult Regular)   Pulse 59   Resp 16   Ht 1.632 m (5' 4.25\")   Wt 46.9 kg (103 lb 8 oz)   SpO2 100%   BMI 17.63 kg/m        General: alert, awake, not in acute distress  HEENT: Head: Normal, normocephalic, atraumatic.  Eye: Normal external eye, conjunctiva, lids cornea, OCTAVIO.  Nose: Normal external nose, mucus membranes and septum.  Pharynx: Normal buccal mucosa. Normal pharynx.  Neck / Thyroid: Supple, no masses, nodes, nodules or enlargement.  Lymphatics: No abnormally enlarged lymph nodes.  Extremities: normal strength, tone, and muscle mass  Skin: normal. no rash or abnormalities  CNS: non focal.      Lab Results    No results found for this or any previous visit (from the past 168 hour(s)).    Imaging Results    No results found.    TT: 45 minutes: time consisted of preparing to see the patient (eg. review of tests/medical records)- 10 minutes, obtaining an/or reviewing separately obtained history, focused examination, review of the diagnosis, review of management plan (25 minutes), communicating with other healthcare professionals, and documenting clinical " information in the electronic or other health record ( 10 minutes).    Signed by: Floyd Graff MD         Again, thank you for allowing me to participate in the care of your patient.        Sincerely,        Floyd Graff MD

## 2021-10-13 NOTE — PROGRESS NOTES
"Oncology Rooming Note    October 13, 2021 2:09 PM   Iman Alves is a 51 year old female who presents for:    Chief Complaint   Patient presents with     Hematology     new consult     Initial Vitals: BP 95/52 (BP Location: Left arm, Cuff Size: Adult Regular)   Pulse 59   Resp 16   Ht 1.632 m (5' 4.25\")   Wt 46.9 kg (103 lb 8 oz)   SpO2 100%   BMI 17.63 kg/m   Estimated body mass index is 17.63 kg/m  as calculated from the following:    Height as of this encounter: 1.632 m (5' 4.25\").    Weight as of this encounter: 46.9 kg (103 lb 8 oz). Body surface area is 1.46 meters squared.  Moderate Pain (5) Comment: Data Unavailable   No LMP recorded.  Allergies reviewed: Yes  Medications reviewed: Yes    Medications: Medication refills not needed today.  Pharmacy name entered into [facility]:    [facility]  [id number] - [place], [place] - [address] [place] [address]    Clinical concerns: new consult     Suzanne Massey            "

## 2021-10-13 NOTE — PROGRESS NOTES
[facility] Hematology and Oncology Consult Note    Patient: Iman Alves  MRN: [id number]  Date of Service: Oct 13, 2021       Reason for Visit    Chief Complaint   Patient presents with     Hematology     new consult         Assessment/Plan    #.  Chronic mild leukopenia   She is otherwise very healthy female without chronic infection, inflammation.  Medications were reviewed in detail.  She is on a few supplements but none is concerning for her mild leukopenia.  She has completely normal hemoglobin and platelets.   She is tested negative negative for hepatitis B, hepatitis C, HIV in 2015. Upon further review, she has similar mild leukopenia with normal hemoglobin platelet in 2013 recorded in care everywhere.   It is reassuring that she has chronic, mild leukopenia of no clinical significance.  Peripheral blood morphology was reviewed by pathologist on 9/10/2021 and it showed mild leukopenia with borderline neutropenia but no dysplasia, excess blasts suggestive of underlying malignant hematologic condition.   She takes vitamin B complex supplements.  Her diet is balanced.  No concern for vitamin or mineral deficiency.   I recommended clinical observation and to check her blood counts based on clinical signs and symptoms only.  I do not recommend additional work-up.  I advised her to seek medical attention and check her CBC with signs and symptoms of infection to know her WBC, neutrophil are adequate.  She voiced understanding.   Follow-up with me as needed.    ECOG Performance 0 - Independent    Encounter Diagnoses:    Problem List Items Addressed This Visit     None      Visit Diagnoses     Leukopenia, unspecified type    -  Primary    Abnormal WBC count                ______________________________________________________________________________    History    Ms. Iman Alves is a very pleasant 51 year old resented herself today for evaluation of low white cell count.  She was found to have mild  leukopenia with normal differential in July 2021 for routine evaluation.  Repeat CBC 2 months later showed mild persistent leukopenia.  She denies alcohol use.  She does not have any chronic infection or inflammation.  No history of chronic hepatitis or HIV infection.  She was taking alpha lipoic acid for recent shoulder injury.  She recalled that she had mastitis during lactation many years ago and she was told her white cell count was abnormal and really back to normal after that.  No hospitalization, or frequent infections.    She does not recall family history of low white cell count.  Father was diagnosed with mantle cell lymphoma at the age of 74.    She has a son who is 14 years old.    Review of systems.  Apart from describing in history, the remainder of comprehensive ROS was negative.    Past History    Past Medical History:   Diagnosis Date     Kidney stone     2008     LGSIL on Pap smear of cervix 5/26/2017 11/11/13 NIL pap, neg HPV 5/26/17 LSIL, neg HPV 8/10/17 Colpo visually normal, no bx 4/29/19 ASCUS, neg HPV 7/19/21 Unsatisfactory pap, neg HPV. Plan: repeat pap in 2-4 months     Past Surgical History:   Procedure Laterality Date     [place] ANESTH,REPAIR UPPER ABD HERNIA NOS      umbilical     Family History   Problem Relation Age of Onset     Osteoporosis Mother      Prostate Cancer Father         at age 70     No Known Problems Sister      No Known Problems Daughter      No Known Problems Maternal Grandmother      Urolithiasis Maternal Grandfather         recurrent     Gout Maternal Grandfather      Prostate Cancer Maternal Grandfather         at age 70     Cancer Maternal Grandfather         non hodgkin's at age 90     No Known Problems Paternal Grandmother      No Known Problems Paternal Grandfather      No Known Problems Maternal Aunt      No Known Problems Paternal Aunt      Urolithiasis Maternal Uncle      Urolithiasis Cousin      Hereditary Breast and Ovarian Cancer Syndrome No family hx of   "    Breast Cancer No family hx of      Colon Cancer No family hx of      Endometrial Cancer No family hx of      Ovarian Cancer No family hx of      Social History     Socioeconomic History     Marital status:      Spouse name: None     Number of children: None     Years of education: None     Highest education level: None   Occupational History     None   Tobacco Use     Smoking status: Never Smoker     Smokeless tobacco: Never Used   Substance and Sexual Activity     Alcohol use: No     Drug use: No     Sexual activity: None   Other Topics Concern     None   Social History Narrative     None     Social Determinants of Health     Financial Resource Strain:      Difficulty of Paying Living Expenses:    Food Insecurity:      Worried About Running Out of Food in the Last Year:      Ran Out of Food in the Last Year:    Transportation Needs:      Lack of Transportation (Medical):      Lack of Transportation (Non-Medical):    Physical Activity:      Days of Exercise per Week:      Minutes of Exercise per Session:    Stress:      Feeling of Stress :    Social Connections:      Frequency of Communication with Friends and Family:      Frequency of Social Gatherings with Friends and Family:      Attends Latter-day Services:      Active Member of Clubs or Organizations:      Attends Club or Organization Meetings:      Marital Status:    Intimate Partner Violence:      Fear of Current or Ex-Partner:      Emotionally Abused:      Physically Abused:      Sexually Abused:          Allergies    No Known Allergies      Physical Exam    BP 95/52 (BP Location: Left arm, Cuff Size: Adult Regular)   Pulse 59   Resp 16   Ht 1.632 m (5' 4.25\")   Wt 46.9 kg (103 lb 8 oz)   SpO2 100%   BMI 17.63 kg/m        General: alert, awake, not in acute distress  HEENT: Head: Normal, normocephalic, atraumatic.  Eye: Normal external eye, conjunctiva, lids cornea, OCTAVIO.  Nose: Normal external nose, mucus membranes and septum.  Pharynx: " Normal buccal mucosa. Normal pharynx.  Neck / Thyroid: Supple, no masses, nodes, nodules or enlargement.  Lymphatics: No abnormally enlarged lymph nodes.  Extremities: normal strength, tone, and muscle mass  Skin: normal. no rash or abnormalities  CNS: non focal.      Lab Results    No results found for this or any previous visit (from the past 168 hour(s)).    Imaging Results    No results found.    TT: 45 minutes: time consisted of preparing to see the patient (eg. review of tests/medical records)- 10 minutes, obtaining an/or reviewing separately obtained history, focused examination, review of the diagnosis, review of management plan (25 minutes), communicating with other healthcare professionals, and documenting clinical information in the electronic or other health record ( 10 minutes).    Signed by: Floyd Graff MD

## 2021-10-28 ENCOUNTER — THERAPY VISIT (OUTPATIENT)
Dept: PHYSICAL THERAPY | Facility: CLINIC | Age: 51
End: 2021-10-28
Payer: COMMERCIAL

## 2021-10-28 DIAGNOSIS — M25.511 SHOULDER PAIN, RIGHT: Primary | ICD-10-CM

## 2021-10-28 PROCEDURE — 97110 THERAPEUTIC EXERCISES: CPT | Mod: GP | Performed by: PHYSICAL THERAPIST

## 2021-10-28 PROCEDURE — 97112 NEUROMUSCULAR REEDUCATION: CPT | Mod: GP | Performed by: PHYSICAL THERAPIST

## 2021-10-28 PROCEDURE — 97140 MANUAL THERAPY 1/> REGIONS: CPT | Mod: GP | Performed by: PHYSICAL THERAPIST

## 2021-12-09 ENCOUNTER — THERAPY VISIT (OUTPATIENT)
Dept: PHYSICAL THERAPY | Facility: CLINIC | Age: 51
End: 2021-12-09
Payer: COMMERCIAL

## 2021-12-09 DIAGNOSIS — M25.511 SHOULDER PAIN, RIGHT: Primary | ICD-10-CM

## 2021-12-09 PROCEDURE — 97110 THERAPEUTIC EXERCISES: CPT | Mod: GP | Performed by: PHYSICAL THERAPIST

## 2021-12-09 PROCEDURE — 97140 MANUAL THERAPY 1/> REGIONS: CPT | Mod: GP | Performed by: PHYSICAL THERAPIST

## 2021-12-09 PROCEDURE — 97112 NEUROMUSCULAR REEDUCATION: CPT | Mod: GP | Performed by: PHYSICAL THERAPIST

## 2022-01-31 NOTE — LETTER
[facility]  [address] [address]  [phone]    2021    Re: Iman Alves   :   1970  MRN:  [id number]   REFERRING PHYSICIAN:   Bear SIMON [facility]  Date of Initial Evaluation:  2/15/22  Visits:  Rxs Used: 9  Reason for Referral:   Right shoulder pain, unspecified chronicity  Closed Colles' fracture of right radius with routine healing  Right wrist pain  Wrist stiffness, right  Other specified aftercare following surgery    EVALUATION SUMMARY  Hand Therapy Progress Note  3/22/2021  Reporting period: 2/15/22 to current date  Diagnosis:  Right Distal radis fracture  DOI:  21  DOS:  21  Procedure:  ORIF  Post: 6w 6days  Referring MD: Bear Alejo MD @ [facility]  Next MD visit: 3/25/21    Subjective:  Iman Alves is a 50 year old right hand dominant female.  S:  Subjective changes as noted by patient: Subjective: the fingers feel more swollen and spllint is tight kendra at night and to the fingers. Pt notes the splint provides improve support of the volar wrist and it is more snug about the forearm. Pt notes she has difficulty getting the fork/spoon to her mouth, can't find the right motion  Functional changes noted by patient: Improvement in Self Care Tasks (dressing, bathing), Work Tasks, Household Chores and Driving   Decreased Performance in Self Care Tasks (eating) and Sleep Patterns  Response to previous treatment:  good and fair  Patient has noted adverse reaction to:   None    Patient Health History  Iman Alves being seen for rehabilitation after ORIF/carpal tunnel surgery for broken distal radius and (non-surgical) broken distal ulna.     Problem began: 2021.   Problem occurred: fall in home   Pain is reported as 4/10 on pain scale.  General health as reported by patient is good.    Pertinent medical history includes: history of fractures, numbness/tingling, pain at  night/rest, changes in skin color and weakness.   Re: Iman Alves   :   1970    Medical allergies: none.   Surgeries include:  Orthopedic surgery and other. Other surgery history details: umbilical cyst repair (); abdominal muscle repair ().    Current medications:  None.    Current occupation is / (self-employed).   Primary job tasks include:  Computer work, driving, prolonged sitting and repetitive tasks.                Occupational Profile Information:  Problem began: was stepping over the baby/puppy gate and tripped and fell, notes she had a minor concussion after the fall and this was assessed at the ED.    Pior functional level:  no limitations    Barriers include:lives with son, age 13, healing fx left leg  Mobility: No difficulty  Transportation: drives  Leisure activities/hobbies: has a new puppy, walk the puppy, works on the house, was painting and remolding, likes to read  Other: difficulty typing only doing it one handed    Functional Outcome Measure:   Please refer to flowsheet    Objective:  Pain Level Report:  Pain Level (Scale 0-10):   2/15/2021 3/22   At Rest 3-4 0   With Use 6-7 6-8     Pain Description:  Date 2/15/2021 3/22    right     Location  elbow, forearm, wrist and hand  Shoulder and elbow cubital tunnel area since the surgery has been noted    Pain Quality Aching, Dull, Numb, Sharp, Tender, Tingling and zinging Can be sharp if I reach too quickly or far, but overall better, even with a bump to the hand can be shooting, the shoulder does limit what I can do with the hand, or inadvertent wt bearing as when sleeping, or turning the hand palm up down. Pain subsides after the incident   Frequency intermittent, constant or daily   intermittent   Pain is worst daytime    Exacerbated by Use and typing    Relieved by heat, rest and touching and moving into a better position, OTC as needed    Progression Not better improving     Re: Iman Alves   :    "1970    Strength:  [x]   Contraindicated  Edema:  mild of the  hand, thumb, fingers and wrist, improving  Wound/Scar: closed, pink and mildly tender and mildly adhered  Palpation:  []     Normal        [x]   Tender       [x]  Mild         []   Moderate []   Severe   Location: volar forearm       Sensation: IMPROVING Decreased Median Nerve distribution per pt report and since the surgery, pt reported  ROM:  Wrist 2/15/2021 2/22/21 3/1 3/8/21 3/22   AROM(PROM) Right       Extension 20 25 35 40 35  POST: 45   Flexion 15 20 45 42 35  POST: 45   RD        UD        Supination Amended:   42  55   Pronation Amended: 65  70  70     ROM:    Thumb  2/15/2021 2/22 3/22   AROM(PROM)  Right     MP  0/30     IP  -5/50     RAbd       PAbd  55     Retropulsion       Kapandji Opposition Scale (0-10/10)  5 7 8                         Re: Iman Alves   :   1970    ROM:   Fingers 2/15/2021 2/15/2021 3/22   AROM(PROM) Right Flex lag    Index MP -15 2.5 0   PIP -10     DIP      FERRARA      Long MP -20 2.5 0   PIP -15     DIP      FERRARA      Ring MP -15 2.5 0   PIP -15     DIP      FERRARA      Small MP -10 1.75 0   PIP -10     DIP      FERRARA        ROM  Shoulder 2021   AROM (PROM) RIGHT   Flexion Supine ~130+   Extension WNL   Internal Rotation WNL   External Rotation 45+   Abduction 110+     Assessment: Pt notes the shoulder is her main limiter to RUE function  Response to therapy has been improvement to:  ROM of Elbow:  All Planes  Wrist:  All Planes, slowly progressing  Thumb:  All Planes, with limitation in thumb abd/ext and flexion  Fingers: All Planes  Edema:  edema is more mobile  Pain:  frequency is less and less tender over affected area  Work Performance:  Using right hand more at work, but not \"normal\" yet.   Self Care Skills:  Using the hand more  Paresthesias:  Median nerve - less intense numbness and tingling  Sensitivity:  scar healing and she is able to palpate the scar and light massage    Overall Assessment:  " Patient is becoming more independent in home exercise program  Patient would benefit from continued therapy to achieve rehab potential    Re: Iman Alves   :   1970    STG/LTG:  STGoals have been reviewed and progress or achievement has occurred;  see goal sheet for details and updates.  I have re-evaluated this patient and find that the nature, scope, duration and intensity of the therapy is appropriate for the medical condition of the patient.    Plan:  Frequency:  1 X a week , once daily  Duration:  for  8 weeks     Treatment Plan:   Modalities:  Paraffin  Therapeutic Exercise:  AROM, AAROM, PROM, Tendon Gliding, Blocking, Reverse Blocking and Extensor Tracking  Neuromuscular re-education:  Nerve Gliding, Coordination/Dexterity, Sensory re-education, Desensitization and Kinesiotaping  Manual Techniques:  Scar mobilization, Myofascial release and Manual edema mobilization  Self Care:  Ergonomic Considerations  Orthotic Fabrication: Static forearm based orthosis    Discharge Plan:    Achieve all LTG.  Independent in home treatment program.  Reach maximal therapeutic benefit.    Home Program:   Edema management per guidelines 10 minutes on the hour every waking hour  AROM of unaffected joints 3 times per day  2/15/2021  Exercise Name: Thumb Active Range of Motion IP Joint Blocking, Sets: 1 - Reps: 10 - Sessions: 3, Notes: Hold and move the tip, in the splint and out of the splint  Exercise Name: Thumb Active Range of Motion MP Joint Blocking, Sets: 1-2 sets - Reps: 10 reps - Sessions: 3, Notes: hold the base of your thumb and move at the middle  joint of your thumb, not bending the tip joint    Exercise Name: Thumb Active Range of Motion Composite Flexion, Sets: 1  - Reps:  5 - 10reps - Sessions: 3x/day, Notes: Remove the splint, and gently move your thumb in a direction down toward the table, and then gently lift it up. Move only in a pain free range, keeping the thumb close to your palm  Exercise Name:  "Thumb Active Range of Motion Palmar Abduction, Sets: 1 set  - Reps: 5 reps - Sessions: 3x/day, Notes: Roll your thumb out, like making a  big C  Exercise Name: Thumb Active Range of Motion Opposition, Sets: 1-2 - Reps: 5 reps - Sessions: 3-5, Notes:  \"O\" position  to each finger as tolerated, do not push into pain.   Gentle motion within your pain tolerance..  Roll your thumb WAY out in a wide arc from first position to each finger, between each finger tips, lift thumb away with attention to lifting from the middle thumb joint.    Exercise Name: Thumb Active Range of Motion Circumduction, Sets: 1-2   - Reps: 5 - 10x each direction - Sessions: 3, Notes: Keeping the thumb in a Capital C. (Twiddle your thumbs) Make slow circles starting from the bottom or base thumb joint and make small circles, keeping the  metacarpal and proximal phalanx in line. Do not allow the thumb to have a \"broken line\" or deviate from the straight line.   Exercise Name: Finger Active Range of Motion DIP Joint Blocking, Sets: 1-2 - Reps: 5-10 repetitions each set - Sessions: 3-5 , Notes: For your end joint of the your finger   Exercise Name: Finger Active Range of Motion PIP Joint Blocking, Sets: 1-2 - Reps: 5-10 each - Sessions: 3-5, Notes: MIDDLE JOINT MOTION involved fingers  You can do this over the edge of a table, or you can.   Re: Iman Alves   :   1970    Hold all fingers with your other palm, and then lift and straighten the middle joint of all finger each one individually  Exercise Name: Finger Active Range of Motion FDS Flexor Tendon Gliding, Sets: 1 set  - Reps: 3-5 reps each finger - Sessions: 3, Notes: Moving each finger separately. Respect the pain, and do VERY SLOWLY. FOCUS the bending at the middle joints, and DO NOT PUSH into pain  do for all fingers  Exercise Name:  Intrinsic Finger Active Range of Motion Ab and Adduction, Sets: 1 - Reps: 5-10 - Sessions: 3-5, Notes: open all your fingers wide, then close them, focus " on the fingers  Exercise Name: Finger Active Range of Motion Table Top Fist Series - Reps: 10 - Sessions: Every hour, Notes: You can hold a ball or small cylinder in your hand to help your finger to move toward a full fist  Exercise Name: Finger Active Range of Motion Tendon Glides Fist Series, Sets: 1 - Reps: 5-10 reps - Sessions: 3-5x/day, Notes: each position, 3 times, then move to the next position, 3 times, and then reach only to the point of pain for the 3rd position, NO PAIN. and you can do this going backwards: deep/long fist, regular fist, hook fist to fingers tall   wrist stays straight  hold 1-2 sec in each position  Exercise Name: Forearm Functional Range of Motion for Pronation/Supination, Sets: 1-2 - Reps: 15-20 - Sessions: 3, Notes: Go Knee to knee:  With your splint on. You  can then try to  roll your forearm on your leg or on a table with out the support of the other hand. Also, touch right knee palm down, then right shoulder, then Left knee palm up then L shoulder  Exercise Name: Forearm Active Range of Motion Combined Pronation and Supination, Sets: 1 - Reps: 10 - Sessions: 3, Notes: Always work toward palm up, even resting in your lap. Turn your palm up and down. You may rest the arm  on a table or in  your lap and roll the arm palm up and down (emphasis on palm up).   Exercise Name: Wrist Active Range of Motion DTM/Dart Throwers Motion with Elbow on Table, Sets: 2 - Reps: 20 - Sessions: 1-2, Notes: Dart throwing motion; move the wrist back and forth, slow deliberate motion. Slow and gentle  Hold a paper ball or a very light foam ball: no gripping, just holding the ball. Move in mid range: about 30% of motion    Next Visit:  Progress ROM and follow MD Orders for moving to strengthening    Thank you for your referral.    INQUIRIES  Therapist: Beth Horton, MELECIO, OTR/L, CHT  [facility]  [address] [place] [id number]  Phone: [phone]      Griseofulvin Counseling:  I discussed with the patient the risks of griseofulvin including but not limited to photosensitivity, cytopenia, liver damage, nausea/vomiting and severe allergy.  The patient understands that this medication is best absorbed when taken with a fatty meal (e.g., ice cream or french fries).

## 2022-02-07 ENCOUNTER — THERAPY VISIT (OUTPATIENT)
Dept: PHYSICAL THERAPY | Facility: CLINIC | Age: 52
End: 2022-02-07
Payer: COMMERCIAL

## 2022-02-07 DIAGNOSIS — M25.511 SHOULDER PAIN, RIGHT: Primary | ICD-10-CM

## 2022-02-07 PROCEDURE — 97110 THERAPEUTIC EXERCISES: CPT | Mod: GP | Performed by: PHYSICAL THERAPIST

## 2022-02-07 PROCEDURE — 97140 MANUAL THERAPY 1/> REGIONS: CPT | Mod: GP | Performed by: PHYSICAL THERAPIST

## 2022-02-07 PROCEDURE — 97112 NEUROMUSCULAR REEDUCATION: CPT | Mod: GP | Performed by: PHYSICAL THERAPIST

## 2022-02-07 NOTE — PROGRESS NOTES
PROGRESS  REPORT    Progress reporting period is from 12/9/21 to 2/7/22.       SUBJECTIVE   Subjective: Having some L shoulder soreness.  Thinks her R shoulder is improving in general.    Current pain level is 0/10  .     Previous pain level was  9/10  .   Changes in function:  Yes (See Goal flowsheet attached for changes in current functional level)  Adverse reaction to treatment or activity: None    OBJECTIVE  Changes noted in objective findings:  Yes  Objective: AROM  on R, 160 on R;  on R, 140 on R;     ASSESSMENT/PLAN  Updated problem list and treatment plan: Diagnosis 1:  Frozen shoulder    Pain -  hot/cold therapy, manual therapy, splint/taping/bracing/orthotics, self management, education and home program  Decreased ROM/flexibility - manual therapy and therapeutic exercise  Decreased joint mobility - manual therapy and therapeutic exercise  Decreased strength - therapeutic exercise and therapeutic activities  Impaired balance - neuro re-education and therapeutic activities  Decreased proprioception - neuro re-education and therapeutic activities  Impaired muscle performance - neuro re-education  Decreased function - therapeutic activities  STG/LTGs have been met or progress has been made towards goals:  Yes (See Goal flow sheet completed today.)  Assessment of Progress: The patient's condition is improving.  Self Management Plans:  Patient has been instructed in a home treatment program.  I have re-evaluated this patient and find that the nature, scope, duration and intensity of the therapy is appropriate for the medical condition of the patient.  Iman continues to require the following intervention to meet STG and LTG's:  PT    Recommendations:  This patient would benefit from continued therapy.     Frequency:  1 X a month, once daily  Duration:  for 3 months        Please refer to the daily flowsheet for treatment today, total treatment time and time spent performing 1:1 timed codes.

## 2022-03-29 ENCOUNTER — THERAPY VISIT (OUTPATIENT)
Dept: PHYSICAL THERAPY | Facility: CLINIC | Age: 52
End: 2022-03-29
Payer: COMMERCIAL

## 2022-03-29 DIAGNOSIS — M25.511 SHOULDER PAIN, RIGHT: Primary | ICD-10-CM

## 2022-03-29 PROCEDURE — 97112 NEUROMUSCULAR REEDUCATION: CPT | Mod: GP | Performed by: PHYSICAL THERAPIST

## 2022-03-29 PROCEDURE — 97110 THERAPEUTIC EXERCISES: CPT | Mod: GP | Performed by: PHYSICAL THERAPIST

## 2022-03-29 PROCEDURE — 97140 MANUAL THERAPY 1/> REGIONS: CPT | Mod: GP | Performed by: PHYSICAL THERAPIST

## 2022-05-12 ENCOUNTER — THERAPY VISIT (OUTPATIENT)
Dept: PHYSICAL THERAPY | Facility: CLINIC | Age: 52
End: 2022-05-12
Payer: COMMERCIAL

## 2022-05-12 DIAGNOSIS — M25.511 SHOULDER PAIN, RIGHT: Primary | ICD-10-CM

## 2022-05-12 PROCEDURE — 97530 THERAPEUTIC ACTIVITIES: CPT | Mod: GP | Performed by: PHYSICAL THERAPIST

## 2022-05-12 PROCEDURE — 97140 MANUAL THERAPY 1/> REGIONS: CPT | Mod: GP | Performed by: PHYSICAL THERAPIST

## 2022-05-12 PROCEDURE — 97110 THERAPEUTIC EXERCISES: CPT | Mod: GP | Performed by: PHYSICAL THERAPIST

## 2022-05-12 NOTE — PROGRESS NOTES
DISCHARGE REPORT    Progress reporting period is from 3/29/22 to 5/12/22.       SUBJECTIVE    Subjective: Shoulder stiffness is resolving.  Wrist is main limiting factor.    Current pain level is 0/10  .     Previous pain level was  0/10  .   Changes in function:  Yes (See Goal flowsheet attached for changes in current functional level)  Adverse reaction to treatment or /activity: None    OBJECTIVE  Changes noted in objective findings:  Yes  Objective: AROM , , ER neutral 58, IR/EXT T10     ASSESSMENT/PLAN  Updated problem list and treatment plan: Diagnosis 1:  Frozen shoulder  STG/LTGs have been met or progress has been made towards goals:  Yes (See Goal flow sheet completed today.)  Assessment of Progress: The patient's condition is improving.  Self Management Plans:  Patient has been instructed in a home treatment program.  I have re-evaluated this patient and find that the nature, scope, duration and intensity of the therapy is appropriate for the medical condition of the patient.  Iman continues to require the following intervention to meet STG and LTG's:  PT intervention is no longer required to meet STG/LTG.    Recommendations:  This patient is ready to be discharged from therapy and continue their home treatment program.    Please refer to the daily flowsheet for treatment today, total treatment time and time spent performing 1:1 timed codes.

## 2022-06-15 ENCOUNTER — PATIENT OUTREACH (OUTPATIENT)
Dept: ONCOLOGY | Facility: CLINIC | Age: 52
End: 2022-06-15
Payer: COMMERCIAL

## 2022-06-15 NOTE — TELEPHONE ENCOUNTER
Her count was very mildly low. She should be fine with precautions that she is going to take caring the dog. Thanks.

## 2022-06-15 NOTE — PROGRESS NOTES
[facility]: Cancer Care Short Note                                    Discussion with Patient:                                                      11:14 a.m.Iman called and LM on the nurse triage line regarding question to pose to Dr Graff. She stated that she was seen in October 2021, dx chronic mild leukopenia. She was recommended to follow up prn and has not had any issues with recurrent infections ro worsening symptoms. She has an opportunity to adopt a special needs dog that has urine and fecal incontinence and needs to be diapered like a baby. The dog has had a history of staph infections on the skin due to the incontinence. She would be using gloves, good handwashing and taking all precautions. She is wondering if providing care to this dog would increase her personal risk of infection, given her diagnosis. She would like dr. Graff to advise so that she can make an informed decision prior to adopting the dog. Message will be routed to Dr. Graff to advise     -12:21 p.m. Called Iman and relayed that per Dr. Graff, her count was very mildly low. She should be fine with precautions that she is planning  to take caring the dog. Iman was delighted to hear this new and expressed appreciation          Assessment:                                                      No assessment indicated    Intervention/Education provided during outreach:                                                       Iman was contacted and uopdated  tht per Dr Graff, she should be fine caring for the dog with appropriate preacautions as stated Luda Taylor RN

## 2022-08-09 ENCOUNTER — DOCUMENTATION ONLY (OUTPATIENT)
Dept: INTERNAL MEDICINE | Facility: CLINIC | Age: 52
End: 2022-08-09

## 2022-08-09 NOTE — PROGRESS NOTES
Patient calling in today states that she has been trying to get her Cologuard order taken care of since June. I have called to verify that they received the order. They state it was not received.  I have refaxed the order to [phone]. Patient will call in 24 hours to make sure order was received if not she will call back.

## 2022-08-25 ENCOUNTER — TRANSFERRED RECORDS (OUTPATIENT)
Dept: MULTI SPECIALTY CLINIC | Facility: CLINIC | Age: 52
End: 2022-08-25

## 2022-08-25 LAB — COLOGUARD-ABSTRACT: NEGATIVE

## 2022-08-28 ENCOUNTER — HEALTH MAINTENANCE LETTER (OUTPATIENT)
Age: 52
End: 2022-08-28

## 2023-01-14 ENCOUNTER — HEALTH MAINTENANCE LETTER (OUTPATIENT)
Age: 53
End: 2023-01-14

## 2023-05-30 ENCOUNTER — ANCILLARY ORDERS (OUTPATIENT)
Dept: MAMMOGRAPHY | Facility: CLINIC | Age: 53
End: 2023-05-30

## 2023-05-30 DIAGNOSIS — Z12.31 VISIT FOR SCREENING MAMMOGRAM: ICD-10-CM

## 2023-07-03 ENCOUNTER — HOSPITAL ENCOUNTER (OUTPATIENT)
Dept: MAMMOGRAPHY | Facility: CLINIC | Age: 53
Discharge: HOME | End: 2023-07-03
Admitting: NURSE PRACTITIONER
Payer: COMMERCIAL

## 2023-07-03 ENCOUNTER — ANCILLARY ORDERS (OUTPATIENT)
Dept: INTERNAL MEDICINE | Facility: CLINIC | Age: 53
End: 2023-07-03

## 2023-07-03 DIAGNOSIS — Z12.31 VISIT FOR SCREENING MAMMOGRAM: ICD-10-CM

## 2023-07-03 PROCEDURE — 77067 SCR MAMMO BI INCL CAD: CPT

## 2023-09-24 ENCOUNTER — HEALTH MAINTENANCE LETTER (OUTPATIENT)
Age: 53
End: 2023-09-24

## 2023-11-07 ENCOUNTER — MYC MEDICAL ADVICE (OUTPATIENT)
Dept: INTERNAL MEDICINE | Facility: CLINIC | Age: 53
End: 2023-11-07
Payer: COMMERCIAL

## 2024-11-17 ENCOUNTER — HEALTH MAINTENANCE LETTER (OUTPATIENT)
Age: 54
End: 2024-11-17

## 2025-08-06 ENCOUNTER — PATIENT OUTREACH (OUTPATIENT)
Dept: CARE COORDINATION | Facility: CLINIC | Age: 55
End: 2025-08-06
Payer: COMMERCIAL

## 2025-08-07 DIAGNOSIS — Z12.11 COLON CANCER SCREENING: ICD-10-CM

## 2025-08-21 ENCOUNTER — ORDERS ONLY (AUTO-RELEASED) (OUTPATIENT)
Dept: URGENT CARE | Facility: CLINIC | Age: 55
End: 2025-08-21
Payer: COMMERCIAL

## 2025-08-21 DIAGNOSIS — Z12.11 COLON CANCER SCREENING: ICD-10-CM
